# Patient Record
Sex: MALE | Race: WHITE | NOT HISPANIC OR LATINO
[De-identification: names, ages, dates, MRNs, and addresses within clinical notes are randomized per-mention and may not be internally consistent; named-entity substitution may affect disease eponyms.]

---

## 2019-04-12 PROBLEM — Z00.00 ENCOUNTER FOR PREVENTIVE HEALTH EXAMINATION: Status: ACTIVE | Noted: 2019-04-12

## 2019-04-15 ENCOUNTER — APPOINTMENT (OUTPATIENT)
Dept: ORTHOPEDIC SURGERY | Facility: CLINIC | Age: 54
End: 2019-04-15
Payer: COMMERCIAL

## 2019-04-15 VITALS
WEIGHT: 138 LBS | HEART RATE: 82 BPM | HEIGHT: 68 IN | DIASTOLIC BLOOD PRESSURE: 81 MMHG | TEMPERATURE: 98.4 F | SYSTOLIC BLOOD PRESSURE: 165 MMHG | BODY MASS INDEX: 20.92 KG/M2

## 2019-04-15 DIAGNOSIS — Z78.9 OTHER SPECIFIED HEALTH STATUS: ICD-10-CM

## 2019-04-15 DIAGNOSIS — Z82.61 FAMILY HISTORY OF ARTHRITIS: ICD-10-CM

## 2019-04-15 PROCEDURE — 99203 OFFICE O/P NEW LOW 30 MIN: CPT

## 2019-04-15 PROCEDURE — 73560 X-RAY EXAM OF KNEE 1 OR 2: CPT | Mod: LT

## 2019-04-22 NOTE — DISCUSSION/SUMMARY
[de-identified] : At this time, due to possible medial meniscal tear of left knee, I recommended he undergo an MRI.  He will be reassessed after the MRI.

## 2019-04-22 NOTE — ADDENDUM
[FreeTextEntry1] : This note was written by Rupal Brasher on 04/22/2019 acting as a scribe for [address]

## 2019-04-22 NOTE — PHYSICAL EXAM
[de-identified] : Ambulating with a slightly antalgic to antalgic gait.  Station:  Normal.  [de-identified] : Right Knee: \par Range of Motion in Degrees	\par 	                  Claimant:	Normal:	\par Flexion Active	  135 	                135-degrees	\par Flexion Passive	  135	                135-degrees	\par Extension Active	  0-5	                0-5-degrees	\par Extension Passive	  0-5	                0-5-degrees	\par \par No weakness to flexion/extension.  No evidence of instability in the AP plane or varus or valgus stress.  Negative  Lachman.  Negative pivot shift.  Negative anterior drawer test.  Negative posterior drawer test.  Negative Anabella.  Negative Apley grind.  No medial or lateral joint line tenderness.  No tenderness over the medial and lateral facet of the patella.  No patellofemoral crepitations.  No lateral tilting patella.  No patellar apprehension.  No crepitation in the medial and lateral femoral condyle.  No proximal or distal swelling, edema or tenderness.  No gross motor or sensory deficits.  No intra-articular swelling.  2+ DP and PT pulses. No varus or valgus malalignment.  Skin is intact.  No rashes, scars or lesions.  \par  \par Left Knee: \par Range of Motion in Degrees	\par 	                  Claimant:	Normal:	\par Flexion Active	  135 	                135-degrees	\par Flexion Passive	  135	                135-degrees	\par Extension Active	  0-5	                0-5-degrees	\par Extension Passive	  0-5	                0-5-degrees	\par \par No weakness to flexion/extension.  No evidence of instability in the AP plane or varus or valgus stress.  Negative  Lachman.  Negative pivot shift.  Negative anterior drawer test.  Negative posterior drawer test.  Positive medial joint line tenderness.  Negative lateral joint line tenderness.  Positive Anabella.  Positive Apley grind.  No tenderness over the medial and lateral facet of the patella.  No patellofemoral crepitations.  No lateral tilting patella.  No patella apprehension.  No crepitation in the medial and lateral femoral condyle.  No proximal or distal swelling, edema or tenderness.  No gross motor or sensory deficits.  Mild intra-articular swelling.  2+ DP and PT pulses.  No varus or valgus malalignment.  Skin is intact.  No rashes, scars or lesions.   \par   [de-identified] : Radiographs, two views of the left knee, show no obvious osseous abnormality. [de-identified] : Appearance:  Well-developed, well-nourished male in no acute distress.\par \par

## 2019-04-22 NOTE — HISTORY OF PRESENT ILLNESS
[2] : the ailment interference is 2/10 [5] : the ailment interference is 5/10 [de-identified] : The patient comes in today with complaints of pain to his left knee.  He twisted and turned and had an onset of pain and swelling. He thought it would get better, but it hasn't gotten better over the last several weeks and is getting worse. He has episodes of the knee catching, popping and locking.  The patient states the onset/injury occurred on 2/1/19.  This injury is not work related or due to an automobile accident.  The patient states the pain is intermittent.  The patient describes the pain as dull and the knee feels unstable.  The patient notes rest and ice makes his symptoms better, while twisting makes his symptoms worse.  The patient indicates pain is at a level of 2 on a pain scale of 0-10. [] : No

## 2019-05-29 ENCOUNTER — APPOINTMENT (OUTPATIENT)
Dept: ORTHOPEDIC SURGERY | Facility: CLINIC | Age: 54
End: 2019-05-29
Payer: COMMERCIAL

## 2019-05-29 VITALS
HEIGHT: 68 IN | DIASTOLIC BLOOD PRESSURE: 91 MMHG | TEMPERATURE: 98.5 F | WEIGHT: 240 LBS | BODY MASS INDEX: 36.37 KG/M2 | SYSTOLIC BLOOD PRESSURE: 141 MMHG | HEART RATE: 73 BPM

## 2019-05-29 DIAGNOSIS — M17.12 UNILATERAL PRIMARY OSTEOARTHRITIS, LEFT KNEE: ICD-10-CM

## 2019-05-29 PROCEDURE — 99212 OFFICE O/P EST SF 10 MIN: CPT

## 2019-06-03 NOTE — ADDENDUM
[FreeTextEntry1] : This note was written by Joshua Joseph on 05/31/2019, acting as a scribe for Kaushik Dorsey III, MD

## 2019-06-03 NOTE — PHYSICAL EXAM
[de-identified] : Left Knee: Range of Motion in Degrees	\par 	                  Claimant:	Normal:	\par Flexion Active	  135 	               135-degrees	\par Flexion Passive	  135	               135-degrees	\par Extension Active	  0-5	               0-5-degrees	\par Extension Passive     0-5	               0-5-degrees	\par \par No weakness to flexion/extension.  No evidence of instability in the AP plane or varus or valgus stress.  Negative  Lachman.  Negative pivot shift.  Negative anterior drawer test.  Negative posterior drawer test.  Positive Anabella.  Positive Apley grind.  Positive medial joint line tenderness.  Negative lateral joint line tenderness.  Positive tenderness over the medial and lateral facet of the patella.  Positive patellofemoral crepitations.  No lateral tilting patella.  No patellar apprehension.  Positive crepitation in the medial and lateral femoral condyle.  No proximal or distal swelling, edema or tenderness.  No gross motor or sensory deficits.  Mild intra-articular swelling.  2+ DP and PT pulses.  No varus or valgus malalignment.  Skin is intact.  No rashes, scars or lesions. \par  [de-identified] : Gait and Station:  Ambulating with a slightly antalgic to antalgic gait.  Normal Station.  [de-identified] : Appearance:  Well developed, well-nourished male in no acute distress.\par   [de-identified] : Review of MRI of the left knee shows a posterior horn medial meniscal tear.\par

## 2019-06-03 NOTE — DISCUSSION/SUMMARY
[de-identified] : At this time, due to osteoarthritis and medial meniscal tear of the left knee, we had a long discussion and since he is feeling better, I instructed him on home therapeutic modalities.  He will be reassessed in 4 weeks and we will discuss the potential for intervention depending on how he responds.\par \par The patient has been advised that their blood pressure today was outside normal ranges and the patient was instructed accordingly. Our Blood Pressure Monitoring Sheet with instructions was given to the patient.\par \par  \par

## 2019-06-03 NOTE — HISTORY OF PRESENT ILLNESS
[de-identified] : The patient comes in today for his left knee.  He states he is feeling much better.  Review of MRI is noted below.

## 2019-06-26 ENCOUNTER — APPOINTMENT (OUTPATIENT)
Dept: ORTHOPEDIC SURGERY | Facility: CLINIC | Age: 54
End: 2019-06-26

## 2021-01-08 ENCOUNTER — APPOINTMENT (OUTPATIENT)
Dept: COLORECTAL SURGERY | Facility: CLINIC | Age: 56
End: 2021-01-08
Payer: COMMERCIAL

## 2021-01-08 PROCEDURE — 99244 OFF/OP CNSLTJ NEW/EST MOD 40: CPT

## 2021-01-08 PROCEDURE — 99072 ADDL SUPL MATRL&STAF TM PHE: CPT

## 2021-01-08 NOTE — DATA REVIEWED
[FreeTextEntry1] : Colonoscopy demonstrates a large 6 cm ulcerated tumor of the proximal transverse colon\par Biopsies are pending

## 2021-01-08 NOTE — ASSESSMENT
[FreeTextEntry1] : 55-year-old male with cancer of the proximal transverse colon and anemia. Recommend further staging with CAT scan of the chest abdomen pelvis, CEA level liver function test. Will most likely require laparoscopic possible open extended right hemicolectomy.Risk and benefits of the surgery have been discussed which include bleeding, infection, sepsis, multiorgan failure, inadvertent injury including hollow viscus, solid organ, ureter neurovascular and neurological structures, DVT PE, heart attack, stroke, hernias, recurrence of tumor, leakage of anastomosis requiring reoperation possible stoma and death.

## 2021-01-08 NOTE — PHYSICAL EXAM
[Abdomen Masses] : No abdominal masses [Abdomen Tenderness] : ~T No ~M abdominal tenderness [Tender] : nontender [JVD] : no jugular venous distention  [Normal Breath Sounds] : Normal breath sounds [Normal Heart Sounds] : normal heart sounds [Normal Rate and Rhythm] : normal rate and rhythm [No Rash or Lesion] : No rash or lesion [Alert] : alert [Oriented to Person] : oriented to person [Oriented to Place] : oriented to place [Oriented to Time] : oriented to time [Calm] : calm [de-identified] : Obese [de-identified] : Looks well in no distress, of stated age. [de-identified] : pupils equal reactive to light normocephalic atraumatic. [de-identified] : moves all 4 extremities appropriately with 5 over 5 strength

## 2021-01-08 NOTE — HISTORY OF PRESENT ILLNESS
[FreeTextEntry1] : Consultation requested by Dr. Rosas for colon cancer. 55-year-old male found to be anemic on blood work colonoscopy today was performed and found to have a large ulcerated tumor of the proximal transverse colon suspicious for cancer. Patient denies any abdominal pain changes in bowel habits or any bleeding

## 2021-01-08 NOTE — CONSULT LETTER
[Dear  ___] : Dear  [unfilled], [Consult Letter:] : I had the pleasure of evaluating your patient, [unfilled]. [Please see my note below.] : Please see my note below. [Consult Closing:] : Thank you very much for allowing me to participate in the care of this patient.  If you have any questions, please do not hesitate to contact me. [Sincerely,] : Sincerely, [FreeTextEntry3] : Augustine Vasquez M.D. FACS, FASCRS

## 2021-01-11 ENCOUNTER — NON-APPOINTMENT (OUTPATIENT)
Age: 56
End: 2021-01-11

## 2021-01-14 ENCOUNTER — APPOINTMENT (OUTPATIENT)
Dept: CT IMAGING | Facility: CLINIC | Age: 56
End: 2021-01-14
Payer: COMMERCIAL

## 2021-01-14 ENCOUNTER — OUTPATIENT (OUTPATIENT)
Dept: OUTPATIENT SERVICES | Facility: HOSPITAL | Age: 56
LOS: 1 days | End: 2021-01-14
Payer: COMMERCIAL

## 2021-01-14 DIAGNOSIS — K63.89 OTHER SPECIFIED DISEASES OF INTESTINE: ICD-10-CM

## 2021-01-14 PROCEDURE — 71260 CT THORAX DX C+: CPT

## 2021-01-14 PROCEDURE — 71260 CT THORAX DX C+: CPT | Mod: 26

## 2021-01-14 PROCEDURE — 82565 ASSAY OF CREATININE: CPT

## 2021-01-14 PROCEDURE — 74177 CT ABD & PELVIS W/CONTRAST: CPT

## 2021-01-14 PROCEDURE — 74177 CT ABD & PELVIS W/CONTRAST: CPT | Mod: 26

## 2021-01-15 ENCOUNTER — NON-APPOINTMENT (OUTPATIENT)
Age: 56
End: 2021-01-15

## 2021-01-19 ENCOUNTER — OUTPATIENT (OUTPATIENT)
Dept: OUTPATIENT SERVICES | Facility: HOSPITAL | Age: 56
LOS: 1 days | End: 2021-01-19
Payer: COMMERCIAL

## 2021-01-19 VITALS
TEMPERATURE: 98 F | SYSTOLIC BLOOD PRESSURE: 129 MMHG | DIASTOLIC BLOOD PRESSURE: 74 MMHG | HEIGHT: 68 IN | OXYGEN SATURATION: 99 % | WEIGHT: 232.59 LBS | HEART RATE: 90 BPM | RESPIRATION RATE: 16 BRPM

## 2021-01-19 DIAGNOSIS — Z98.890 OTHER SPECIFIED POSTPROCEDURAL STATES: Chronic | ICD-10-CM

## 2021-01-19 DIAGNOSIS — Z01.818 ENCOUNTER FOR OTHER PREPROCEDURAL EXAMINATION: ICD-10-CM

## 2021-01-19 DIAGNOSIS — C18.4 MALIGNANT NEOPLASM OF TRANSVERSE COLON: ICD-10-CM

## 2021-01-19 LAB
ALBUMIN SERPL ELPH-MCNC: 3.9 G/DL — SIGNIFICANT CHANGE UP (ref 3.3–5)
ALP SERPL-CCNC: 50 U/L — SIGNIFICANT CHANGE UP (ref 40–120)
ALT FLD-CCNC: 30 U/L — SIGNIFICANT CHANGE UP (ref 12–78)
ANION GAP SERPL CALC-SCNC: 5 MMOL/L — SIGNIFICANT CHANGE UP (ref 5–17)
APTT BLD: 26 SEC — LOW (ref 27.5–35.5)
AST SERPL-CCNC: 14 U/L — LOW (ref 15–37)
BASOPHILS # BLD AUTO: 0.04 K/UL — SIGNIFICANT CHANGE UP (ref 0–0.2)
BASOPHILS NFR BLD AUTO: 0.8 % — SIGNIFICANT CHANGE UP (ref 0–2)
BILIRUB DIRECT SERPL-MCNC: <0.1 MG/DL — SIGNIFICANT CHANGE UP (ref 0–0.2)
BILIRUB SERPL-MCNC: 0.4 MG/DL — SIGNIFICANT CHANGE UP (ref 0.2–1.2)
BUN SERPL-MCNC: 15 MG/DL — SIGNIFICANT CHANGE UP (ref 7–23)
CALCIUM SERPL-MCNC: 9.7 MG/DL — SIGNIFICANT CHANGE UP (ref 8.5–10.1)
CHLORIDE SERPL-SCNC: 107 MMOL/L — SIGNIFICANT CHANGE UP (ref 96–108)
CO2 SERPL-SCNC: 28 MMOL/L — SIGNIFICANT CHANGE UP (ref 22–31)
CREAT SERPL-MCNC: 0.75 MG/DL — SIGNIFICANT CHANGE UP (ref 0.5–1.3)
EOSINOPHIL # BLD AUTO: 0.07 K/UL — SIGNIFICANT CHANGE UP (ref 0–0.5)
EOSINOPHIL NFR BLD AUTO: 1.4 % — SIGNIFICANT CHANGE UP (ref 0–6)
GLUCOSE SERPL-MCNC: 116 MG/DL — HIGH (ref 70–99)
HCT VFR BLD CALC: 38.4 % — LOW (ref 39–50)
HGB BLD-MCNC: 11.5 G/DL — LOW (ref 13–17)
IMM GRANULOCYTES NFR BLD AUTO: 0.2 % — SIGNIFICANT CHANGE UP (ref 0–1.5)
INR BLD: 1.06 RATIO — SIGNIFICANT CHANGE UP (ref 0.88–1.16)
LYMPHOCYTES # BLD AUTO: 1.54 K/UL — SIGNIFICANT CHANGE UP (ref 1–3.3)
LYMPHOCYTES # BLD AUTO: 30.2 % — SIGNIFICANT CHANGE UP (ref 13–44)
MCHC RBC-ENTMCNC: 22 PG — LOW (ref 27–34)
MCHC RBC-ENTMCNC: 29.9 GM/DL — LOW (ref 32–36)
MCV RBC AUTO: 73.6 FL — LOW (ref 80–100)
MONOCYTES # BLD AUTO: 0.48 K/UL — SIGNIFICANT CHANGE UP (ref 0–0.9)
MONOCYTES NFR BLD AUTO: 9.4 % — SIGNIFICANT CHANGE UP (ref 2–14)
NEUTROPHILS # BLD AUTO: 2.96 K/UL — SIGNIFICANT CHANGE UP (ref 1.8–7.4)
NEUTROPHILS NFR BLD AUTO: 58 % — SIGNIFICANT CHANGE UP (ref 43–77)
PLATELET # BLD AUTO: 215 K/UL — SIGNIFICANT CHANGE UP (ref 150–400)
POTASSIUM SERPL-MCNC: 3.8 MMOL/L — SIGNIFICANT CHANGE UP (ref 3.5–5.3)
POTASSIUM SERPL-SCNC: 3.8 MMOL/L — SIGNIFICANT CHANGE UP (ref 3.5–5.3)
PROT SERPL-MCNC: 7.6 GM/DL — SIGNIFICANT CHANGE UP (ref 6–8.3)
PROTHROM AB SERPL-ACNC: 12.4 SEC — SIGNIFICANT CHANGE UP (ref 10.6–13.6)
RBC # BLD: 5.22 M/UL — SIGNIFICANT CHANGE UP (ref 4.2–5.8)
RBC # FLD: 18.3 % — HIGH (ref 10.3–14.5)
SODIUM SERPL-SCNC: 140 MMOL/L — SIGNIFICANT CHANGE UP (ref 135–145)
WBC # BLD: 5.1 K/UL — SIGNIFICANT CHANGE UP (ref 3.8–10.5)
WBC # FLD AUTO: 5.1 K/UL — SIGNIFICANT CHANGE UP (ref 3.8–10.5)

## 2021-01-19 PROCEDURE — 82378 CARCINOEMBRYONIC ANTIGEN: CPT

## 2021-01-19 PROCEDURE — 80053 COMPREHEN METABOLIC PANEL: CPT

## 2021-01-19 PROCEDURE — 82248 BILIRUBIN DIRECT: CPT

## 2021-01-19 PROCEDURE — 85610 PROTHROMBIN TIME: CPT

## 2021-01-19 PROCEDURE — 93010 ELECTROCARDIOGRAM REPORT: CPT

## 2021-01-19 PROCEDURE — G0463: CPT | Mod: 25

## 2021-01-19 PROCEDURE — 83036 HEMOGLOBIN GLYCOSYLATED A1C: CPT

## 2021-01-19 PROCEDURE — 36415 COLL VENOUS BLD VENIPUNCTURE: CPT

## 2021-01-19 PROCEDURE — 86900 BLOOD TYPING SEROLOGIC ABO: CPT

## 2021-01-19 PROCEDURE — 85025 COMPLETE CBC W/AUTO DIFF WBC: CPT

## 2021-01-19 PROCEDURE — 86850 RBC ANTIBODY SCREEN: CPT

## 2021-01-19 PROCEDURE — 85730 THROMBOPLASTIN TIME PARTIAL: CPT

## 2021-01-19 PROCEDURE — 93005 ELECTROCARDIOGRAM TRACING: CPT

## 2021-01-19 PROCEDURE — 86901 BLOOD TYPING SEROLOGIC RH(D): CPT

## 2021-01-19 RX ORDER — ERGOCALCIFEROL 1.25 MG/1
1 CAPSULE ORAL
Qty: 0 | Refills: 0 | DISCHARGE

## 2021-01-19 NOTE — H&P PST ADULT - NSANTHOSAYNRD_GEN_A_CORE
No. MANSI screening performed.  STOP BANG Legend: 0-2 = LOW Risk; 3-4 = INTERMEDIATE Risk; 5-8 = HIGH Risk

## 2021-01-19 NOTE — H&P PST ADULT - NSICDXFAMILYHX_GEN_ALL_CORE_FT
FAMILY HISTORY:  FHx: cardiovascular disease, brother has a stent and is alive and well as of 1/19/2021  FHx: pancreatic cancer, father passed away

## 2021-01-19 NOTE — H&P PST ADULT - HISTORY OF PRESENT ILLNESS
This is a 56 y/o male with a PMH of HTN and HLD who reports he had a CBC drawn with regular labs which showed he was anemic. He than did a quaic stool which was positive. He has since had a colonoscopy which showed a colon mass and he is now scheduled for a Right hemicolectomy He denies any change in bowel habits, no visible blood in stool, dysuria, back pain, abdominal pain N/V/D or constipation or SOB, chest pain or palpations.  This is a 56 y/o male with a PMH of HTN and HLD who reports he had a CBC drawn with regular labs at his PCP office which showed he was anemic. He than did a guaiac stool which was positive. He has since had a colonoscopy which showed a colon mass and he is now scheduled for a Right hemicolectomy. He denies any change in bowel habits, no visible blood in stool, dysuria, back pain, abdominal pain, N/V/D or constipation, SOB, chest pain or palpations.

## 2021-01-19 NOTE — H&P PST ADULT - ASSESSMENT
This is a 56 y/o male with a colon mass who is scheduled for a Right hemicolectomy    Patient instructed on     1. To follow instructions as per ASU for NPO status   2. On the use of EZ sponges  3. Aware that he needs medical clearance (was done by Dr. Lizama)   4. Instructed to call [phone] to confirm COVID 19 appointment scheduled for     CAPRINI SCORE    AGE RELATED RISK FACTORS                                                       MOBILITY RELATED FACTORS  X Age 41-60 years                                            (1 Point)                  [ ] Bed rest                                                        (1 Point)  [ ] Age: 61-74 years                                           (2 Points)                [ ] Plaster cast                                                   (2 Points)  [ ] Age= 75 years                                              (3 Points)                 [ ] Bed bound for more than 72 hours                   (2 Points)    DISEASE RELATED RISK FACTORS                                               GENDER SPECIFIC FACTORS  [ ] Edema in the lower extremities                       (1 Point)                  [ ] Pregnancy                                                     (1 Point)  [ ] Varicose veins                                               (1 Point)                  [ ] Post-partum < 6 weeks                                   (1 Point)             X  BMI > 25 Kg/m2                                            (1 Point)                  [ ] Hormonal therapy  or oral contraception            (1 Point)                 [ ] Sepsis (in the previous month)                        (1 Point)                  [ ] History of pregnancy complications  [ ] Pneumonia or serious lung disease                                               [ ] Unexplained or recurrent                       (1 Point)           (in the previous month)                               (1 Point)  [ ] Abnormal pulmonary function test                     (1 Point)                 SURGERY RELATED RISK FACTORS  [ ] Acute myocardial infarction                              (1 Point)                 [ ]  Section                                            (1 Point)  [ ] Congestive heart failure (in the previous month)  (1 Point)                 [ ] Minor surgery                                                 (1 Point)   [ ] Inflammatory bowel disease                             (1 Point)                 [ ] Arthroscopic surgery                                        (2 Points)  [ ] Central venous access                                    (2 Points)                X  General surgery lasting more than 45 minutes   (2 Points)       [ ] Stroke (in the previous month)                          (5 Points)               [ ] Elective arthroplasty                                        (5 Points)            X malignancy                                                      (2 points)                                                                                                                                 HEMATOLOGY RELATED FACTORS                                                 TRAUMA RELATED RISK FACTORS  [ ] Prior episodes of VTE                                     (3 Points)                 [ ] Fracture of the hip, pelvis, or leg                       (5 Points)  [ ] Positive family history for VTE                         (3 Points)                 [ ] Acute spinal cord injury (in the previous month)  (5 Points)  [ ] Prothrombin 26350 A                                      (3 Points)                 [ ] Paralysis  (less than 1 month)                          (5 Points)  [ ] Factor V Leiden                                             (3 Points)                 [ ] Multiple Trauma within 1 month                         (5 Points)  [ ] Lupus anticoagulants                                     (3 Points)                                                           [ ] Anticardiolipin antibodies                                (3 Points)                                                       [ ] High homocysteine in the blood                      (3 Points)                                             [ ] Other congenital or acquired thrombophilia       (3 Points)                                                [ ] Heparin induced thrombocytopenia                  (3 Points)                                          Total Score: 6    The Caprini score indicates that this patient is at high risk for a VTE event (score => 6).    Surgical patients in this group will benefit from both pharmacologic prophylaxis and intermittent compression devices.    The surgical team will determine the balance between VTE risk and bleeding risk, and other clinical considerations.

## 2021-01-20 DIAGNOSIS — C18.4 MALIGNANT NEOPLASM OF TRANSVERSE COLON: ICD-10-CM

## 2021-01-20 DIAGNOSIS — Z01.818 ENCOUNTER FOR OTHER PREPROCEDURAL EXAMINATION: ICD-10-CM

## 2021-01-20 LAB
A1C WITH ESTIMATED AVERAGE GLUCOSE RESULT: 6.4 % — HIGH (ref 4–5.6)
CEA SERPL-MCNC: 4.7 NG/ML — HIGH (ref 0–3.8)
ESTIMATED AVERAGE GLUCOSE: 137 MG/DL — HIGH (ref 68–114)

## 2021-01-21 RX ORDER — SODIUM CHLORIDE 9 MG/ML
3 INJECTION INTRAMUSCULAR; INTRAVENOUS; SUBCUTANEOUS EVERY 8 HOURS
Refills: 0 | Status: DISCONTINUED | OUTPATIENT
Start: 2021-01-27 | End: 2021-01-29

## 2021-01-24 ENCOUNTER — APPOINTMENT (OUTPATIENT)
Dept: DISASTER EMERGENCY | Facility: CLINIC | Age: 56
End: 2021-01-24

## 2021-01-26 ENCOUNTER — OUTPATIENT (OUTPATIENT)
Dept: OUTPATIENT SERVICES | Facility: HOSPITAL | Age: 56
LOS: 1 days | End: 2021-01-26
Payer: COMMERCIAL

## 2021-01-26 ENCOUNTER — RESULT REVIEW (OUTPATIENT)
Age: 56
End: 2021-01-26

## 2021-01-26 DIAGNOSIS — Z98.890 OTHER SPECIFIED POSTPROCEDURAL STATES: Chronic | ICD-10-CM

## 2021-01-26 DIAGNOSIS — C18.4 MALIGNANT NEOPLASM OF TRANSVERSE COLON: ICD-10-CM

## 2021-01-26 PROBLEM — K63.89 OTHER SPECIFIED DISEASES OF INTESTINE: Chronic | Status: ACTIVE | Noted: 2021-01-19

## 2021-01-26 PROBLEM — I10 ESSENTIAL (PRIMARY) HYPERTENSION: Chronic | Status: ACTIVE | Noted: 2021-01-19

## 2021-01-26 PROBLEM — E78.5 HYPERLIPIDEMIA, UNSPECIFIED: Chronic | Status: ACTIVE | Noted: 2021-01-19

## 2021-01-26 LAB — SARS-COV-2 N GENE NPH QL NAA+PROBE: NOT DETECTED

## 2021-01-26 PROCEDURE — 88321 CONSLTJ&REPRT SLD PREP ELSWR: CPT

## 2021-01-27 ENCOUNTER — RESULT REVIEW (OUTPATIENT)
Age: 56
End: 2021-01-27

## 2021-01-27 ENCOUNTER — APPOINTMENT (OUTPATIENT)
Dept: COLORECTAL SURGERY | Facility: HOSPITAL | Age: 56
End: 2021-01-27
Payer: COMMERCIAL

## 2021-01-27 ENCOUNTER — INPATIENT (INPATIENT)
Facility: HOSPITAL | Age: 56
LOS: 1 days | Discharge: HOME | DRG: 331 | End: 2021-01-29
Attending: COLON & RECTAL SURGERY | Admitting: COLON & RECTAL SURGERY
Payer: COMMERCIAL

## 2021-01-27 VITALS
TEMPERATURE: 98 F | HEIGHT: 68 IN | DIASTOLIC BLOOD PRESSURE: 84 MMHG | HEART RATE: 83 BPM | SYSTOLIC BLOOD PRESSURE: 128 MMHG | RESPIRATION RATE: 16 BRPM | WEIGHT: 232.59 LBS | OXYGEN SATURATION: 99 %

## 2021-01-27 DIAGNOSIS — C18.4 MALIGNANT NEOPLASM OF TRANSVERSE COLON: ICD-10-CM

## 2021-01-27 DIAGNOSIS — D50.0 IRON DEFICIENCY ANEMIA SECONDARY TO BLOOD LOSS (CHRONIC): ICD-10-CM

## 2021-01-27 DIAGNOSIS — Z98.890 OTHER SPECIFIED POSTPROCEDURAL STATES: Chronic | ICD-10-CM

## 2021-01-27 PROCEDURE — 49255 REMOVAL OF OMENTUM: CPT | Mod: AS

## 2021-01-27 PROCEDURE — 88309 TISSUE EXAM BY PATHOLOGIST: CPT

## 2021-01-27 PROCEDURE — 44213 LAP MOBIL SPLENIC FL ADD-ON: CPT

## 2021-01-27 PROCEDURE — 44213 LAP MOBIL SPLENIC FL ADD-ON: CPT | Mod: AS

## 2021-01-27 PROCEDURE — 88341 IMHCHEM/IMCYTCHM EA ADD ANTB: CPT | Mod: 26

## 2021-01-27 PROCEDURE — 44204 LAPARO PARTIAL COLECTOMY: CPT | Mod: AS

## 2021-01-27 PROCEDURE — 88341 IMHCHEM/IMCYTCHM EA ADD ANTB: CPT

## 2021-01-27 PROCEDURE — 49255 REMOVAL OF OMENTUM: CPT

## 2021-01-27 PROCEDURE — 44204 LAPARO PARTIAL COLECTOMY: CPT

## 2021-01-27 PROCEDURE — 88309 TISSUE EXAM BY PATHOLOGIST: CPT | Mod: 26

## 2021-01-27 PROCEDURE — C1889: CPT

## 2021-01-27 PROCEDURE — 83735 ASSAY OF MAGNESIUM: CPT

## 2021-01-27 PROCEDURE — 36415 COLL VENOUS BLD VENIPUNCTURE: CPT

## 2021-01-27 PROCEDURE — 38570 LAPAROSCOPY LYMPH NODE BIOP: CPT

## 2021-01-27 PROCEDURE — 38570 LAPAROSCOPY LYMPH NODE BIOP: CPT | Mod: AS

## 2021-01-27 PROCEDURE — 88342 IMHCHEM/IMCYTCHM 1ST ANTB: CPT | Mod: 26

## 2021-01-27 PROCEDURE — 85025 COMPLETE CBC W/AUTO DIFF WBC: CPT

## 2021-01-27 PROCEDURE — C9399: CPT

## 2021-01-27 PROCEDURE — 80048 BASIC METABOLIC PNL TOTAL CA: CPT

## 2021-01-27 PROCEDURE — 88342 IMHCHEM/IMCYTCHM 1ST ANTB: CPT

## 2021-01-27 PROCEDURE — 84100 ASSAY OF PHOSPHORUS: CPT

## 2021-01-27 PROCEDURE — 82962 GLUCOSE BLOOD TEST: CPT

## 2021-01-27 RX ORDER — OXYCODONE HYDROCHLORIDE 5 MG/1
5 TABLET ORAL EVERY 4 HOURS
Refills: 0 | Status: DISCONTINUED | OUTPATIENT
Start: 2021-01-27 | End: 2021-01-29

## 2021-01-27 RX ORDER — SODIUM CHLORIDE 9 MG/ML
1000 INJECTION, SOLUTION INTRAVENOUS
Refills: 0 | Status: DISCONTINUED | OUTPATIENT
Start: 2021-01-27 | End: 2021-01-27

## 2021-01-27 RX ORDER — CEFOTETAN DISODIUM 1 G
2 VIAL (EA) INJECTION EVERY 12 HOURS
Refills: 0 | Status: COMPLETED | OUTPATIENT
Start: 2021-01-28 | End: 2021-01-28

## 2021-01-27 RX ORDER — ACETAMINOPHEN 500 MG
1000 TABLET ORAL EVERY 6 HOURS
Refills: 0 | Status: COMPLETED | OUTPATIENT
Start: 2021-01-27 | End: 2021-01-28

## 2021-01-27 RX ORDER — KETOROLAC TROMETHAMINE 30 MG/ML
15 SYRINGE (ML) INJECTION EVERY 6 HOURS
Refills: 0 | Status: DISCONTINUED | OUTPATIENT
Start: 2021-01-27 | End: 2021-01-28

## 2021-01-27 RX ORDER — OXYCODONE HYDROCHLORIDE 5 MG/1
10 TABLET ORAL EVERY 4 HOURS
Refills: 0 | Status: DISCONTINUED | OUTPATIENT
Start: 2021-01-27 | End: 2021-01-29

## 2021-01-27 RX ORDER — ONDANSETRON 8 MG/1
4 TABLET, FILM COATED ORAL ONCE
Refills: 0 | Status: DISCONTINUED | OUTPATIENT
Start: 2021-01-27 | End: 2021-01-27

## 2021-01-27 RX ORDER — FENTANYL CITRATE 50 UG/ML
50 INJECTION INTRAVENOUS
Refills: 0 | Status: DISCONTINUED | OUTPATIENT
Start: 2021-01-27 | End: 2021-01-27

## 2021-01-27 RX ORDER — HEPARIN SODIUM 5000 [USP'U]/ML
5000 INJECTION INTRAVENOUS; SUBCUTANEOUS EVERY 8 HOURS
Refills: 0 | Status: DISCONTINUED | OUTPATIENT
Start: 2021-01-28 | End: 2021-01-29

## 2021-01-27 RX ORDER — SODIUM CHLORIDE 9 MG/ML
1000 INJECTION, SOLUTION INTRAVENOUS
Refills: 0 | Status: DISCONTINUED | OUTPATIENT
Start: 2021-01-27 | End: 2021-01-28

## 2021-01-27 RX ORDER — LISINOPRIL 2.5 MG/1
10 TABLET ORAL DAILY
Refills: 0 | Status: DISCONTINUED | OUTPATIENT
Start: 2021-01-27 | End: 2021-01-29

## 2021-01-27 RX ORDER — ALVIMOPAN 12 MG/1
12 CAPSULE ORAL
Refills: 0 | Status: DISCONTINUED | OUTPATIENT
Start: 2021-01-27 | End: 2021-01-28

## 2021-01-27 RX ORDER — ACETAMINOPHEN 500 MG
650 TABLET ORAL EVERY 6 HOURS
Refills: 0 | Status: DISCONTINUED | OUTPATIENT
Start: 2021-01-27 | End: 2021-01-29

## 2021-01-27 RX ORDER — HEPARIN SODIUM 5000 [USP'U]/ML
5000 INJECTION INTRAVENOUS; SUBCUTANEOUS ONCE
Refills: 0 | Status: COMPLETED | OUTPATIENT
Start: 2021-01-27 | End: 2021-01-27

## 2021-01-27 RX ORDER — ONDANSETRON 8 MG/1
4 TABLET, FILM COATED ORAL EVERY 6 HOURS
Refills: 0 | Status: DISCONTINUED | OUTPATIENT
Start: 2021-01-27 | End: 2021-01-29

## 2021-01-27 RX ORDER — ALVIMOPAN 12 MG/1
12 CAPSULE ORAL ONCE
Refills: 0 | Status: COMPLETED | OUTPATIENT
Start: 2021-01-27 | End: 2021-01-27

## 2021-01-27 RX ORDER — ATORVASTATIN CALCIUM 80 MG/1
80 TABLET, FILM COATED ORAL AT BEDTIME
Refills: 0 | Status: DISCONTINUED | OUTPATIENT
Start: 2021-01-27 | End: 2021-01-29

## 2021-01-27 RX ADMIN — HEPARIN SODIUM 5000 UNIT(S): 5000 INJECTION INTRAVENOUS; SUBCUTANEOUS at 11:38

## 2021-01-27 RX ADMIN — Medication 15 MILLIGRAM(S): at 23:41

## 2021-01-27 RX ADMIN — LISINOPRIL 10 MILLIGRAM(S): 2.5 TABLET ORAL at 22:08

## 2021-01-27 RX ADMIN — SODIUM CHLORIDE 3 MILLILITER(S): 9 INJECTION INTRAMUSCULAR; INTRAVENOUS; SUBCUTANEOUS at 22:05

## 2021-01-27 RX ADMIN — Medication 400 MILLIGRAM(S): at 23:41

## 2021-01-27 RX ADMIN — ATORVASTATIN CALCIUM 80 MILLIGRAM(S): 80 TABLET, FILM COATED ORAL at 22:09

## 2021-01-27 RX ADMIN — ALVIMOPAN 12 MILLIGRAM(S): 12 CAPSULE ORAL at 11:38

## 2021-01-27 RX ADMIN — ALVIMOPAN 12 MILLIGRAM(S): 12 CAPSULE ORAL at 22:08

## 2021-01-27 NOTE — BRIEF OPERATIVE NOTE - NSICDXBRIEFPROCEDURE_GEN_ALL_CORE_FT
PROCEDURES:  Mobilization, hepatic flexure 27-Jan-2021 16:13:28  Mele Montero  Laparoscopic hand-assisted right hemicolectomy 27-Jan-2021 16:12:35  Mele Montero

## 2021-01-28 ENCOUNTER — TRANSCRIPTION ENCOUNTER (OUTPATIENT)
Age: 56
End: 2021-01-28

## 2021-01-28 LAB
ANION GAP SERPL CALC-SCNC: 5 MMOL/L — SIGNIFICANT CHANGE UP (ref 5–17)
BASOPHILS # BLD AUTO: 0.01 K/UL — SIGNIFICANT CHANGE UP (ref 0–0.2)
BASOPHILS NFR BLD AUTO: 0.1 % — SIGNIFICANT CHANGE UP (ref 0–2)
BUN SERPL-MCNC: 11 MG/DL — SIGNIFICANT CHANGE UP (ref 7–23)
CALCIUM SERPL-MCNC: 9.6 MG/DL — SIGNIFICANT CHANGE UP (ref 8.5–10.1)
CHLORIDE SERPL-SCNC: 107 MMOL/L — SIGNIFICANT CHANGE UP (ref 96–108)
CO2 SERPL-SCNC: 27 MMOL/L — SIGNIFICANT CHANGE UP (ref 22–31)
CREAT SERPL-MCNC: 1.26 MG/DL — SIGNIFICANT CHANGE UP (ref 0.5–1.3)
EOSINOPHIL # BLD AUTO: 0 K/UL — SIGNIFICANT CHANGE UP (ref 0–0.5)
EOSINOPHIL NFR BLD AUTO: 0 % — SIGNIFICANT CHANGE UP (ref 0–6)
GLUCOSE SERPL-MCNC: 123 MG/DL — HIGH (ref 70–99)
HCT VFR BLD CALC: 36.1 % — LOW (ref 39–50)
HGB BLD-MCNC: 10.8 G/DL — LOW (ref 13–17)
IMM GRANULOCYTES NFR BLD AUTO: 0.3 % — SIGNIFICANT CHANGE UP (ref 0–1.5)
LYMPHOCYTES # BLD AUTO: 1.66 K/UL — SIGNIFICANT CHANGE UP (ref 1–3.3)
LYMPHOCYTES # BLD AUTO: 12.9 % — LOW (ref 13–44)
MAGNESIUM SERPL-MCNC: 2.2 MG/DL — SIGNIFICANT CHANGE UP (ref 1.6–2.6)
MCHC RBC-ENTMCNC: 21.9 PG — LOW (ref 27–34)
MCHC RBC-ENTMCNC: 29.9 GM/DL — LOW (ref 32–36)
MCV RBC AUTO: 73.2 FL — LOW (ref 80–100)
MONOCYTES # BLD AUTO: 1.39 K/UL — HIGH (ref 0–0.9)
MONOCYTES NFR BLD AUTO: 10.8 % — SIGNIFICANT CHANGE UP (ref 2–14)
NEUTROPHILS # BLD AUTO: 9.81 K/UL — HIGH (ref 1.8–7.4)
NEUTROPHILS NFR BLD AUTO: 75.9 % — SIGNIFICANT CHANGE UP (ref 43–77)
PHOSPHATE SERPL-MCNC: 3.8 MG/DL — SIGNIFICANT CHANGE UP (ref 2.5–4.5)
PLATELET # BLD AUTO: 237 K/UL — SIGNIFICANT CHANGE UP (ref 150–400)
POTASSIUM SERPL-MCNC: 4.2 MMOL/L — SIGNIFICANT CHANGE UP (ref 3.5–5.3)
POTASSIUM SERPL-SCNC: 4.2 MMOL/L — SIGNIFICANT CHANGE UP (ref 3.5–5.3)
RBC # BLD: 4.93 M/UL — SIGNIFICANT CHANGE UP (ref 4.2–5.8)
RBC # FLD: 18.4 % — HIGH (ref 10.3–14.5)
SODIUM SERPL-SCNC: 139 MMOL/L — SIGNIFICANT CHANGE UP (ref 135–145)
WBC # BLD: 12.91 K/UL — HIGH (ref 3.8–10.5)
WBC # FLD AUTO: 12.91 K/UL — HIGH (ref 3.8–10.5)

## 2021-01-28 RX ORDER — SIMETHICONE 80 MG/1
80 TABLET, CHEWABLE ORAL ONCE
Refills: 0 | Status: COMPLETED | OUTPATIENT
Start: 2021-01-28 | End: 2021-01-28

## 2021-01-28 RX ORDER — ALVIMOPAN 12 MG/1
12 CAPSULE ORAL
Refills: 0 | Status: DISCONTINUED | OUTPATIENT
Start: 2021-01-28 | End: 2021-01-29

## 2021-01-28 RX ADMIN — HEPARIN SODIUM 5000 UNIT(S): 5000 INJECTION INTRAVENOUS; SUBCUTANEOUS at 13:25

## 2021-01-28 RX ADMIN — OXYCODONE HYDROCHLORIDE 10 MILLIGRAM(S): 5 TABLET ORAL at 01:30

## 2021-01-28 RX ADMIN — Medication 100 GRAM(S): at 15:02

## 2021-01-28 RX ADMIN — SIMETHICONE 80 MILLIGRAM(S): 80 TABLET, CHEWABLE ORAL at 00:42

## 2021-01-28 RX ADMIN — Medication 100 GRAM(S): at 04:18

## 2021-01-28 RX ADMIN — SODIUM CHLORIDE 3 MILLILITER(S): 9 INJECTION INTRAMUSCULAR; INTRAVENOUS; SUBCUTANEOUS at 10:25

## 2021-01-28 RX ADMIN — OXYCODONE HYDROCHLORIDE 10 MILLIGRAM(S): 5 TABLET ORAL at 18:42

## 2021-01-28 RX ADMIN — HEPARIN SODIUM 5000 UNIT(S): 5000 INJECTION INTRAVENOUS; SUBCUTANEOUS at 21:14

## 2021-01-28 RX ADMIN — Medication 400 MILLIGRAM(S): at 11:04

## 2021-01-28 RX ADMIN — OXYCODONE HYDROCHLORIDE 10 MILLIGRAM(S): 5 TABLET ORAL at 23:30

## 2021-01-28 RX ADMIN — ALVIMOPAN 12 MILLIGRAM(S): 12 CAPSULE ORAL at 21:15

## 2021-01-28 RX ADMIN — OXYCODONE HYDROCHLORIDE 10 MILLIGRAM(S): 5 TABLET ORAL at 05:37

## 2021-01-28 RX ADMIN — LISINOPRIL 10 MILLIGRAM(S): 2.5 TABLET ORAL at 21:15

## 2021-01-28 RX ADMIN — SODIUM CHLORIDE 3 MILLILITER(S): 9 INJECTION INTRAMUSCULAR; INTRAVENOUS; SUBCUTANEOUS at 05:30

## 2021-01-28 RX ADMIN — ALVIMOPAN 12 MILLIGRAM(S): 12 CAPSULE ORAL at 10:24

## 2021-01-28 RX ADMIN — Medication 15 MILLIGRAM(S): at 11:03

## 2021-01-28 RX ADMIN — ATORVASTATIN CALCIUM 80 MILLIGRAM(S): 80 TABLET, FILM COATED ORAL at 21:15

## 2021-01-28 RX ADMIN — Medication 400 MILLIGRAM(S): at 05:29

## 2021-01-28 RX ADMIN — Medication 15 MILLIGRAM(S): at 05:30

## 2021-01-28 RX ADMIN — SODIUM CHLORIDE 3 MILLILITER(S): 9 INJECTION INTRAMUSCULAR; INTRAVENOUS; SUBCUTANEOUS at 21:16

## 2021-01-28 RX ADMIN — Medication 1 TABLET(S): at 10:25

## 2021-01-28 RX ADMIN — HEPARIN SODIUM 5000 UNIT(S): 5000 INJECTION INTRAVENOUS; SUBCUTANEOUS at 05:30

## 2021-01-28 NOTE — DISCHARGE NOTE PROVIDER - NSDCCPTREATMENT_GEN_ALL_CORE_FT
PRINCIPAL PROCEDURE  Procedure: Laparoscopic hand-assisted right hemicolectomy  Findings and Treatment:       SECONDARY PROCEDURE  Procedure: Mobilization, hepatic flexure  Findings and Treatment:

## 2021-01-28 NOTE — DISCHARGE NOTE PROVIDER - NSDCACTIVITY_GEN_ALL_CORE
Showering allowed/Stairs allowed/Driving allowed/Walking - Indoors allowed/No heavy lifting/straining/Walking - Outdoors allowed

## 2021-01-28 NOTE — PROGRESS NOTE ADULT - SUBJECTIVE AND OBJECTIVE BOX
CC:Patient is a 55y old  Male who presents with a chief complaint of     Subjective:  Pt seen and examined at bedside. Pt is AAOx3, pt in no acute distress. Pt denies fever, chills, chest pain, SOB, abd pain, N/V/D, extremity pain or dysfunction, hemoptysis, hematemesis, hematuria, hematochexia, headache, diplopia, vertigo, dizzyness. Natarajan removed this am.     ROS:      Vital Signs Last 24 Hrs  T(C): 36.7 (28 Jan 2021 05:23), Max: 37.3 (27 Jan 2021 16:10)  T(F): 98 (28 Jan 2021 05:23), Max: 99.1 (27 Jan 2021 16:10)  HR: 75 (28 Jan 2021 05:23) (75 - 107)  BP: 100/56 (28 Jan 2021 05:23) (100/56 - 156/70)  BP(mean): --  RR: 18 (28 Jan 2021 05:23) (12 - 23)  SpO2: 95% (28 Jan 2021 05:23) (95% - 100%)    Labs:                            Meds:  acetaminophen   Tablet .. 650 milliGRAM(s) Oral every 6 hours PRN  acetaminophen  IVPB .. 1000 milliGRAM(s) IV Intermittent every 6 hours  alvimopan 12 milliGRAM(s) Oral two times a day  atorvastatin 80 milliGRAM(s) Oral at bedtime  cefoTEtan  IVPB 2 Gram(s) IV Intermittent every 12 hours  heparin   Injectable 5000 Unit(s) SubCutaneous every 8 hours  ketorolac   Injectable 15 milliGRAM(s) IV Push every 6 hours  lactated ringers. 1000 milliLiter(s) IV Continuous <Continuous>  lisinopril 10 milliGRAM(s) Oral daily  multivitamin 1 Tablet(s) Oral daily  ondansetron Injectable 4 milliGRAM(s) IV Push every 6 hours PRN  oxyCODONE    IR 5 milliGRAM(s) Oral every 4 hours PRN  oxyCODONE    IR 10 milliGRAM(s) Oral every 4 hours PRN  sodium chloride 0.9% lock flush 3 milliLiter(s) IV Push every 8 hours      Radiology:      Physical exam:  Pt is aaox3  Pt in no acute distress  Psych: normal affect  Resp: CTAB  CVS: S1S2(+)  ABD: bowel sounds (+), soft, non distended, no rebound, no guarding, no rigidity, dressing in place   EXT: no calf tenderness or edema to exam b/l, on VTE prophylaxis  Skin: no adverse skin changes to exam

## 2021-01-28 NOTE — DISCHARGE NOTE PROVIDER - HOSPITAL COURSE
Pt with right sided colon cancer, s/p Lap right hemicolectomy, postop course uneventful with pain control and return of bowel function.

## 2021-01-28 NOTE — PROGRESS NOTE ADULT - ASSESSMENT
54 yo M s/p right laparoscopic hemicolectomy POD - 1    Plan:   monitor vitals   pain control   nausea control   Diet: FLD, ADAT   encourage IS use  encourage OOB/A  DVT/GI ppx   DC dunn void check     Plan discussed with attending

## 2021-01-28 NOTE — DISCHARGE NOTE PROVIDER - NSDCMRMEDTOKEN_GEN_ALL_CORE_FT
Crestor 20 mg oral tablet: 1 tab(s) orally once a day  lisinopril 10 mg oral tablet: 1 tab(s) orally once a day (at bedtime)  Multiple Vitamins oral tablet: 1 tab(s) orally once a day  oxycodone-acetaminophen 5 mg-325 mg oral tablet: 1 tab(s) orally every 6 hours, As Needed -for moderate pain [id number]  vitamin C: 500 milligram(s) orally once a day  Vitamin D3 1000 intl units (25 mcg) oral capsule: 2 cap(s) orally once a day

## 2021-01-28 NOTE — DISCHARGE NOTE PROVIDER - CARE PROVIDER_API CALL
Augustine Vasquez)  ColonRectal Surgery; Surgery  [address] [place] [address]  Phone: [phone]  Fax: [phone]  Follow Up Time:

## 2021-01-29 ENCOUNTER — TRANSCRIPTION ENCOUNTER (OUTPATIENT)
Age: 56
End: 2021-01-29

## 2021-01-29 VITALS
TEMPERATURE: 98 F | RESPIRATION RATE: 16 BRPM | OXYGEN SATURATION: 96 % | SYSTOLIC BLOOD PRESSURE: 134 MMHG | DIASTOLIC BLOOD PRESSURE: 70 MMHG | HEART RATE: 89 BPM

## 2021-01-29 LAB
ANION GAP SERPL CALC-SCNC: 5 MMOL/L — SIGNIFICANT CHANGE UP (ref 5–17)
BASOPHILS # BLD AUTO: 0.03 K/UL — SIGNIFICANT CHANGE UP (ref 0–0.2)
BASOPHILS NFR BLD AUTO: 0.3 % — SIGNIFICANT CHANGE UP (ref 0–2)
BUN SERPL-MCNC: 12 MG/DL — SIGNIFICANT CHANGE UP (ref 7–23)
CALCIUM SERPL-MCNC: 9.3 MG/DL — SIGNIFICANT CHANGE UP (ref 8.5–10.1)
CHLORIDE SERPL-SCNC: 105 MMOL/L — SIGNIFICANT CHANGE UP (ref 96–108)
CO2 SERPL-SCNC: 28 MMOL/L — SIGNIFICANT CHANGE UP (ref 22–31)
CREAT SERPL-MCNC: 0.83 MG/DL — SIGNIFICANT CHANGE UP (ref 0.5–1.3)
EOSINOPHIL # BLD AUTO: 0.04 K/UL — SIGNIFICANT CHANGE UP (ref 0–0.5)
EOSINOPHIL NFR BLD AUTO: 0.4 % — SIGNIFICANT CHANGE UP (ref 0–6)
GLUCOSE SERPL-MCNC: 157 MG/DL — HIGH (ref 70–99)
HCT VFR BLD CALC: 36 % — LOW (ref 39–50)
HGB BLD-MCNC: 10.5 G/DL — LOW (ref 13–17)
IMM GRANULOCYTES NFR BLD AUTO: 0.4 % — SIGNIFICANT CHANGE UP (ref 0–1.5)
LYMPHOCYTES # BLD AUTO: 1.12 K/UL — SIGNIFICANT CHANGE UP (ref 1–3.3)
LYMPHOCYTES # BLD AUTO: 10.9 % — LOW (ref 13–44)
MAGNESIUM SERPL-MCNC: 2 MG/DL — SIGNIFICANT CHANGE UP (ref 1.6–2.6)
MCHC RBC-ENTMCNC: 21.4 PG — LOW (ref 27–34)
MCHC RBC-ENTMCNC: 29.2 GM/DL — LOW (ref 32–36)
MCV RBC AUTO: 73.3 FL — LOW (ref 80–100)
MONOCYTES # BLD AUTO: 0.81 K/UL — SIGNIFICANT CHANGE UP (ref 0–0.9)
MONOCYTES NFR BLD AUTO: 7.9 % — SIGNIFICANT CHANGE UP (ref 2–14)
NEUTROPHILS # BLD AUTO: 8.26 K/UL — HIGH (ref 1.8–7.4)
NEUTROPHILS NFR BLD AUTO: 80.1 % — HIGH (ref 43–77)
PHOSPHATE SERPL-MCNC: 2.8 MG/DL — SIGNIFICANT CHANGE UP (ref 2.5–4.5)
PLATELET # BLD AUTO: 199 K/UL — SIGNIFICANT CHANGE UP (ref 150–400)
POTASSIUM SERPL-MCNC: 4 MMOL/L — SIGNIFICANT CHANGE UP (ref 3.5–5.3)
POTASSIUM SERPL-SCNC: 4 MMOL/L — SIGNIFICANT CHANGE UP (ref 3.5–5.3)
RBC # BLD: 4.91 M/UL — SIGNIFICANT CHANGE UP (ref 4.2–5.8)
RBC # FLD: 18.2 % — HIGH (ref 10.3–14.5)
SODIUM SERPL-SCNC: 138 MMOL/L — SIGNIFICANT CHANGE UP (ref 135–145)
WBC # BLD: 10.3 K/UL — SIGNIFICANT CHANGE UP (ref 3.8–10.5)
WBC # FLD AUTO: 10.3 K/UL — SIGNIFICANT CHANGE UP (ref 3.8–10.5)

## 2021-01-29 RX ORDER — ACETAMINOPHEN 500 MG
2 TABLET ORAL
Qty: 56 | Refills: 0
Start: 2021-01-29 | End: 2021-02-04

## 2021-01-29 RX ORDER — SIMETHICONE 80 MG/1
80 TABLET, CHEWABLE ORAL ONCE
Refills: 0 | Status: COMPLETED | OUTPATIENT
Start: 2021-01-29 | End: 2021-01-29

## 2021-01-29 RX ORDER — SIMETHICONE 80 MG/1
1 TABLET, CHEWABLE ORAL
Qty: 10 | Refills: 0
Start: 2021-01-29

## 2021-01-29 RX ORDER — IBUPROFEN 200 MG
1 TABLET ORAL
Qty: 28 | Refills: 0
Start: 2021-01-29 | End: 2021-02-04

## 2021-01-29 RX ADMIN — SIMETHICONE 80 MILLIGRAM(S): 80 TABLET, CHEWABLE ORAL at 06:55

## 2021-01-29 RX ADMIN — SODIUM CHLORIDE 3 MILLILITER(S): 9 INJECTION INTRAMUSCULAR; INTRAVENOUS; SUBCUTANEOUS at 05:09

## 2021-01-29 RX ADMIN — HEPARIN SODIUM 5000 UNIT(S): 5000 INJECTION INTRAVENOUS; SUBCUTANEOUS at 05:10

## 2021-01-29 RX ADMIN — ALVIMOPAN 12 MILLIGRAM(S): 12 CAPSULE ORAL at 09:00

## 2021-01-29 NOTE — DISCHARGE NOTE NURSING/CASE MANAGEMENT/SOCIAL WORK - PATIENT PORTAL LINK FT
You can access the FollowMyHealth Patient Portal offered by [facility] by registering at the following website: [email or web address]. By joining [employer]’s FollowMyHealth portal, you will also be able to view your health information using other applications (apps) compatible with our system.

## 2021-01-29 NOTE — PROGRESS NOTE ADULT - ASSESSMENT
56 yo M s/p right laparoscopic hemicolectomy POD - 2    Plan:   monitor vitals   pain control   nausea control   Diet: LFD   encourage IS use  encourage OOB/A  DVT/GI ppx   DC today     Plan discussed with attending

## 2021-01-29 NOTE — PROGRESS NOTE ADULT - SUBJECTIVE AND OBJECTIVE BOX
CC:Patient is a 55y old  Male who presents with a chief complaint of     Subjective:  Pt seen and examined at bedside. Pt is AAOx3, pt in no acute distress. Pt denies fever, chills, chest pain, SOB, abd pain, N/V/D, extremity pain or dysfunction, hemoptysis, hematemesis, hematuria, hematochexia, headache, diplopia, vertigo, dizzyness. reports Bm and flatus. patient tolerate diet     ROS:      Vital Signs Last 24 Hrs  T(C): 36.7 (28 Jan 2021 05:23), Max: 37.3 (27 Jan 2021 16:10)  T(F): 98 (28 Jan 2021 05:23), Max: 99.1 (27 Jan 2021 16:10)  HR: 75 (28 Jan 2021 05:23) (75 - 107)  BP: 100/56 (28 Jan 2021 05:23) (100/56 - 156/70)  BP(mean): --  RR: 18 (28 Jan 2021 05:23) (12 - 23)  SpO2: 95% (28 Jan 2021 05:23) (95% - 100%)    Labs:                                10.8   12.91 )-----------( 237      ( 28 Jan 2021 08:17 )             36.1   01-28    139  |  107  |  11  ----------------------------<  123<H>  4.2   |  27  |  1.26    Ca    9.6      28 Jan 2021 08:17  Phos  3.8     01-28  Mg     2.2     01-28                      Meds:  acetaminophen   Tablet .. 650 milliGRAM(s) Oral every 6 hours PRN  acetaminophen  IVPB .. 1000 milliGRAM(s) IV Intermittent every 6 hours  alvimopan 12 milliGRAM(s) Oral two times a day  atorvastatin 80 milliGRAM(s) Oral at bedtime  cefoTEtan  IVPB 2 Gram(s) IV Intermittent every 12 hours  heparin   Injectable 5000 Unit(s) SubCutaneous every 8 hours  ketorolac   Injectable 15 milliGRAM(s) IV Push every 6 hours  lactated ringers. 1000 milliLiter(s) IV Continuous <Continuous>  lisinopril 10 milliGRAM(s) Oral daily  multivitamin 1 Tablet(s) Oral daily  ondansetron Injectable 4 milliGRAM(s) IV Push every 6 hours PRN  oxyCODONE    IR 5 milliGRAM(s) Oral every 4 hours PRN  oxyCODONE    IR 10 milliGRAM(s) Oral every 4 hours PRN  sodium chloride 0.9% lock flush 3 milliLiter(s) IV Push every 8 hours      Radiology:      Physical exam:  Pt is aaox3  Pt in no acute distress  Psych: normal affect  Resp: CTAB  CVS: S1S2(+)  ABD: bowel sounds (+), soft, non distended, no rebound, no guarding, no rigidity, dressing in place   EXT: no calf tenderness or edema to exam b/l, on VTE prophylaxis  Skin: no adverse skin changes to exam

## 2021-02-03 ENCOUNTER — NON-APPOINTMENT (OUTPATIENT)
Age: 56
End: 2021-02-03

## 2021-02-03 DIAGNOSIS — C18.2 MALIGNANT NEOPLASM OF ASCENDING COLON: ICD-10-CM

## 2021-02-03 DIAGNOSIS — D50.0 IRON DEFICIENCY ANEMIA SECONDARY TO BLOOD LOSS (CHRONIC): ICD-10-CM

## 2021-02-04 DIAGNOSIS — S83.207D UNSPECIFIED TEAR OF UNSPECIFIED MENISCUS, CURRENT INJURY, LEFT KNEE, SUBSEQUENT ENCOUNTER: ICD-10-CM

## 2021-02-04 DIAGNOSIS — M25.562 PAIN IN LEFT KNEE: ICD-10-CM

## 2021-02-04 DIAGNOSIS — K63.89 OTHER SPECIFIED DISEASES OF INTESTINE: ICD-10-CM

## 2021-02-04 DIAGNOSIS — Z01.818 ENCOUNTER FOR OTHER PREPROCEDURAL EXAMINATION: ICD-10-CM

## 2021-02-04 DIAGNOSIS — I10 ESSENTIAL (PRIMARY) HYPERTENSION: ICD-10-CM

## 2021-02-04 DIAGNOSIS — E78.00 PURE HYPERCHOLESTEROLEMIA, UNSPECIFIED: ICD-10-CM

## 2021-02-04 RX ORDER — METRONIDAZOLE 500 MG/1
500 TABLET ORAL
Qty: 3 | Refills: 0 | Status: DISCONTINUED | COMMUNITY
Start: 2021-01-08 | End: 2021-02-04

## 2021-02-04 RX ORDER — NEOMYCIN SULFATE 500 MG/1
500 TABLET ORAL
Qty: 6 | Refills: 0 | Status: DISCONTINUED | COMMUNITY
Start: 2021-01-08 | End: 2021-02-04

## 2021-02-08 ENCOUNTER — OUTPATIENT (OUTPATIENT)
Dept: OUTPATIENT SERVICES | Facility: HOSPITAL | Age: 56
LOS: 1 days | Discharge: HOME | End: 2021-02-08

## 2021-02-08 DIAGNOSIS — Z98.890 OTHER SPECIFIED POSTPROCEDURAL STATES: Chronic | ICD-10-CM

## 2021-02-08 DIAGNOSIS — C18.9 MALIGNANT NEOPLASM OF COLON, UNSPECIFIED: ICD-10-CM

## 2021-02-10 ENCOUNTER — APPOINTMENT (OUTPATIENT)
Dept: HEMATOLOGY ONCOLOGY | Facility: CLINIC | Age: 56
End: 2021-02-10
Payer: COMMERCIAL

## 2021-02-10 VITALS
HEIGHT: 68 IN | WEIGHT: 227 LBS | SYSTOLIC BLOOD PRESSURE: 137 MMHG | HEART RATE: 93 BPM | DIASTOLIC BLOOD PRESSURE: 76 MMHG | TEMPERATURE: 97.3 F | BODY MASS INDEX: 34.4 KG/M2 | RESPIRATION RATE: 16 BRPM

## 2021-02-10 DIAGNOSIS — Z80.52 FAMILY HISTORY OF MALIGNANT NEOPLASM OF BLADDER: ICD-10-CM

## 2021-02-10 DIAGNOSIS — Z80.3 FAMILY HISTORY OF MALIGNANT NEOPLASM OF BREAST: ICD-10-CM

## 2021-02-10 DIAGNOSIS — Z80.0 FAMILY HISTORY OF MALIGNANT NEOPLASM OF DIGESTIVE ORGANS: ICD-10-CM

## 2021-02-10 PROCEDURE — 99205 OFFICE O/P NEW HI 60 MIN: CPT

## 2021-02-11 PROBLEM — Z80.0 FAMILY HISTORY OF PANCREATIC CANCER: Status: ACTIVE | Noted: 2021-02-11

## 2021-02-11 PROBLEM — Z80.52 FAMILY HISTORY OF MALIGNANT NEOPLASM OF URINARY BLADDER: Status: ACTIVE | Noted: 2021-02-11

## 2021-02-11 PROBLEM — Z80.3 FAMILY HISTORY OF BREAST CANCER: Status: ACTIVE | Noted: 2021-02-11

## 2021-02-11 NOTE — REASON FOR VISIT
[Initial Consultation] : an initial consultation [FreeTextEntry2] : colon cancer  here to discuss adjuvant therapy

## 2021-02-11 NOTE — ASSESSMENT
[FreeTextEntry1] : 54 y/o male with stage II B  T3, N0 cancer of proximal transverse colon s/p  R hemicolectomy 1/27/21 .\par High risk features : poorly differentiated histology with LVI. Tumor is MSI- stable. \par Patient is young and in excellent health.  He wants to be aggressive about his medical care. \par Discussed rationale, logistics and potential side effects of adjuvant 5 Fu based chemotherapy. Explained that for high risk stage II colon cancer survival benefit is no more that ~ 5 % at 5 years. His disease is MSI stable and will benefit from 5 Fu chemotherapy. Statistical benefit from oxaliplatin was not demonstrated in stage II disease although could be considered for highest risk patient ( like large T4 tumors or perforated tumors). Oxaliplatin carries a risk of potentially permanent neuropathy.\par Discussed above with patient and his wife  in details, answered multiple questions.\par \par Recommend : adjuvant chemotherapy with capecitabine x 6 months.\par Patient is inclined to take chemotherapy but not decided yet. he will call me back in next few days with his decision.\par \par \par CC: Dr Evaristo Lizama

## 2021-02-11 NOTE — HISTORY OF PRESENT ILLNESS
[Disease: _____________________] : Disease: [unfilled] [T: ___] : T[unfilled] [N: ___] : N[unfilled] [AJCC Stage: ____] : AJCC Stage: [unfilled] [de-identified] : 54 y/o male  found to have mild iron deficiency anemia of routine blood work. Colonoscopy ( Dr Rosas) revealed a large  tumor in the proximal transverse colon. Patient had no GI symptoms.\par CT CAP  1/14/21 : no evidence  of metastatic disease.\par \par 1/27/21 R hemicolectomy : poorly diff adenocarcinoma 2.5 cm infiltrates into pericolonic tissue LVI +  0/14 LN .\par \par He recovered form surgery very well. Bowel movement normal daily.\par Feels " wonderful".\par \par  [de-identified] : poorly differentiated adenocarcinoma G 3  [de-identified] : MMRP  intact   MSI  stable

## 2021-02-11 NOTE — PHYSICAL EXAM
[Fully active, able to carry on all pre-disease performance without restriction] : Status 0 - Fully active, able to carry on all pre-disease performance without restriction [Normal] : affect appropriate [de-identified] : looks well  [de-identified] : healing surgical incision

## 2021-02-11 NOTE — CONSULT LETTER
[Dear  ___] : Dear ~MICAELA, [( Thank you for referring [unfilled] for consultation for _____ )] : Thank you for referring [unfilled] for consultation for [unfilled] [Please see my note below.] : Please see my note below. [Consult Closing:] : Thank you very much for allowing me to participate in the care of this patient.  If you have any questions, please do not hesitate to contact me. [Sincerely,] : Sincerely, [FreeTextEntry2] : Dr Dick Vasquez [FreeTextEntry3] : Lula Sumner

## 2021-02-16 ENCOUNTER — APPOINTMENT (OUTPATIENT)
Dept: COLORECTAL SURGERY | Facility: CLINIC | Age: 56
End: 2021-02-16
Payer: COMMERCIAL

## 2021-02-16 VITALS
RESPIRATION RATE: 16 BRPM | HEART RATE: 86 BPM | BODY MASS INDEX: 34.4 KG/M2 | WEIGHT: 227 LBS | DIASTOLIC BLOOD PRESSURE: 87 MMHG | SYSTOLIC BLOOD PRESSURE: 127 MMHG | TEMPERATURE: 97.7 F | HEIGHT: 68 IN

## 2021-02-16 PROCEDURE — 99024 POSTOP FOLLOW-UP VISIT: CPT

## 2021-02-16 NOTE — PHYSICAL EXAM
[Respiratory Effort] : normal respiratory effort [Calm] : calm [de-identified] : Soft, nontender, nondistended.  Well-healed surgical incisions and staples removed. [de-identified] : Well-appearing, in no distress [de-identified] : Normocephalic, atraumatic [de-identified] : Moves extremities without difficulty [de-identified] : Warm and dry [de-identified] : Alert and oriented x3

## 2021-02-16 NOTE — HISTORY OF PRESENT ILLNESS
[FreeTextEntry1] : 55-year-old male who presents for a postoperative visit after undergoing a laparoscopic right colectomy for cancer in the transverse colon.  He is recovering very well from surgery.  He denies any fevers, chills, nausea or vomiting.  He is having bowel movements without difficulty.

## 2021-02-18 ENCOUNTER — RESULT REVIEW (OUTPATIENT)
Age: 56
End: 2021-02-18

## 2021-02-23 ENCOUNTER — RESULT REVIEW (OUTPATIENT)
Age: 56
End: 2021-02-23

## 2021-02-23 ENCOUNTER — APPOINTMENT (OUTPATIENT)
Dept: HEMATOLOGY ONCOLOGY | Facility: CLINIC | Age: 56
End: 2021-02-23
Payer: COMMERCIAL

## 2021-02-23 VITALS
WEIGHT: 232.2 LBS | TEMPERATURE: 97.3 F | BODY MASS INDEX: 35.31 KG/M2 | HEART RATE: 90 BPM | SYSTOLIC BLOOD PRESSURE: 127 MMHG | DIASTOLIC BLOOD PRESSURE: 77 MMHG

## 2021-02-23 LAB
BASOPHILS # BLD AUTO: 0.04 K/UL — SIGNIFICANT CHANGE UP (ref 0–0.2)
BASOPHILS NFR BLD AUTO: 0.6 % — SIGNIFICANT CHANGE UP (ref 0–2)
EOSINOPHIL # BLD AUTO: 0.27 K/UL — SIGNIFICANT CHANGE UP (ref 0–0.5)
EOSINOPHIL NFR BLD AUTO: 4.3 % — SIGNIFICANT CHANGE UP (ref 0–6)
HCT VFR BLD CALC: 37.4 % — LOW (ref 39–50)
HGB BLD-MCNC: 11.4 G/DL — LOW (ref 13–17)
IMM GRANULOCYTES NFR BLD AUTO: 0.2 % — SIGNIFICANT CHANGE UP (ref 0–1.5)
LYMPHOCYTES # BLD AUTO: 1.68 K/UL — SIGNIFICANT CHANGE UP (ref 1–3.3)
LYMPHOCYTES # BLD AUTO: 26.6 % — SIGNIFICANT CHANGE UP (ref 13–44)
MCHC RBC-ENTMCNC: 21.9 PG — LOW (ref 27–34)
MCHC RBC-ENTMCNC: 30.5 GM/DL — LOW (ref 32–36)
MCV RBC AUTO: 71.8 FL — LOW (ref 80–100)
MONOCYTES # BLD AUTO: 0.61 K/UL — SIGNIFICANT CHANGE UP (ref 0–0.9)
MONOCYTES NFR BLD AUTO: 9.7 % — SIGNIFICANT CHANGE UP (ref 2–14)
NEUTROPHILS # BLD AUTO: 3.71 K/UL — SIGNIFICANT CHANGE UP (ref 1.8–7.4)
NEUTROPHILS NFR BLD AUTO: 58.6 % — SIGNIFICANT CHANGE UP (ref 43–77)
NRBC # BLD: 0 /100 WBCS — SIGNIFICANT CHANGE UP (ref 0–0)
PLATELET # BLD AUTO: 227 K/UL — SIGNIFICANT CHANGE UP (ref 150–400)
RBC # BLD: 5.21 M/UL — SIGNIFICANT CHANGE UP (ref 4.2–5.8)
RBC # FLD: 19.3 % — HIGH (ref 10.3–14.5)
WBC # BLD: 6.32 K/UL — SIGNIFICANT CHANGE UP (ref 3.8–10.5)
WBC # FLD AUTO: 6.32 K/UL — SIGNIFICANT CHANGE UP (ref 3.8–10.5)

## 2021-02-23 PROCEDURE — 99215 OFFICE O/P EST HI 40 MIN: CPT

## 2021-02-23 RX ORDER — UBIDECARENONE/VIT E ACET 100MG-5
CAPSULE ORAL
Refills: 0 | Status: ACTIVE | COMMUNITY

## 2021-02-24 LAB
ALBUMIN SERPL ELPH-MCNC: 4.3 G/DL — SIGNIFICANT CHANGE UP (ref 3.3–5)
ALP SERPL-CCNC: 66 U/L — SIGNIFICANT CHANGE UP (ref 40–120)
ALT FLD-CCNC: 29 U/L — SIGNIFICANT CHANGE UP (ref 10–45)
ANION GAP SERPL CALC-SCNC: 14 MMOL/L — SIGNIFICANT CHANGE UP (ref 5–17)
AST SERPL-CCNC: 20 U/L — SIGNIFICANT CHANGE UP (ref 10–40)
BILIRUB SERPL-MCNC: 0.3 MG/DL — SIGNIFICANT CHANGE UP (ref 0.2–1.2)
BUN SERPL-MCNC: 15 MG/DL — SIGNIFICANT CHANGE UP (ref 7–23)
CALCIUM SERPL-MCNC: 9.3 MG/DL — SIGNIFICANT CHANGE UP (ref 8.4–10.5)
CEA SERPL-MCNC: 7 NG/ML — HIGH (ref 0–3.8)
CHLORIDE SERPL-SCNC: 101 MMOL/L — SIGNIFICANT CHANGE UP (ref 96–108)
CO2 SERPL-SCNC: 24 MMOL/L — SIGNIFICANT CHANGE UP (ref 22–31)
CREAT SERPL-MCNC: 0.89 MG/DL — SIGNIFICANT CHANGE UP (ref 0.5–1.3)
FERRITIN SERPL-MCNC: 16 NG/ML — LOW (ref 30–400)
FOLATE SERPL-MCNC: 14.1 NG/ML — SIGNIFICANT CHANGE UP
GLUCOSE SERPL-MCNC: 191 MG/DL — HIGH (ref 70–99)
IRON SATN MFR SERPL: 33 UG/DL — LOW (ref 45–165)
IRON SATN MFR SERPL: 9 % — LOW (ref 16–55)
POTASSIUM SERPL-MCNC: 4 MMOL/L — SIGNIFICANT CHANGE UP (ref 3.5–5.3)
POTASSIUM SERPL-SCNC: 4 MMOL/L — SIGNIFICANT CHANGE UP (ref 3.5–5.3)
PROT SERPL-MCNC: 6.9 G/DL — SIGNIFICANT CHANGE UP (ref 6–8.3)
SODIUM SERPL-SCNC: 139 MMOL/L — SIGNIFICANT CHANGE UP (ref 135–145)
TIBC SERPL-MCNC: 381 UG/DL — SIGNIFICANT CHANGE UP (ref 220–430)
UIBC SERPL-MCNC: 348 UG/DL — SIGNIFICANT CHANGE UP (ref 110–370)
VIT B12 SERPL-MCNC: 486 PG/ML — SIGNIFICANT CHANGE UP (ref 232–1245)

## 2021-02-26 ENCOUNTER — LABORATORY RESULT (OUTPATIENT)
Age: 56
End: 2021-02-26

## 2021-02-26 ENCOUNTER — APPOINTMENT (OUTPATIENT)
Dept: HEMATOLOGY ONCOLOGY | Facility: CLINIC | Age: 56
End: 2021-02-26

## 2021-03-03 ENCOUNTER — APPOINTMENT (OUTPATIENT)
Dept: INFUSION THERAPY | Facility: CLINIC | Age: 56
End: 2021-03-03

## 2021-03-03 ENCOUNTER — NON-APPOINTMENT (OUTPATIENT)
Age: 56
End: 2021-03-03

## 2021-03-03 VITALS
DIASTOLIC BLOOD PRESSURE: 75 MMHG | TEMPERATURE: 97.1 F | WEIGHT: 233 LBS | RESPIRATION RATE: 16 BRPM | HEART RATE: 83 BPM | SYSTOLIC BLOOD PRESSURE: 133 MMHG | BODY MASS INDEX: 35.31 KG/M2 | HEIGHT: 68 IN

## 2021-03-04 DIAGNOSIS — D50.9 IRON DEFICIENCY ANEMIA, UNSPECIFIED: ICD-10-CM

## 2021-03-10 ENCOUNTER — APPOINTMENT (OUTPATIENT)
Dept: INFUSION THERAPY | Facility: CLINIC | Age: 56
End: 2021-03-10

## 2021-03-10 VITALS
HEART RATE: 73 BPM | SYSTOLIC BLOOD PRESSURE: 130 MMHG | TEMPERATURE: 97.2 F | BODY MASS INDEX: 35.46 KG/M2 | DIASTOLIC BLOOD PRESSURE: 73 MMHG | WEIGHT: 234 LBS | HEIGHT: 68 IN | RESPIRATION RATE: 16 BRPM

## 2021-03-13 ENCOUNTER — OUTPATIENT (OUTPATIENT)
Dept: OUTPATIENT SERVICES | Facility: HOSPITAL | Age: 56
LOS: 1 days | Discharge: HOME | End: 2021-03-13

## 2021-03-13 DIAGNOSIS — Z98.890 OTHER SPECIFIED POSTPROCEDURAL STATES: Chronic | ICD-10-CM

## 2021-03-13 DIAGNOSIS — C18.9 MALIGNANT NEOPLASM OF COLON, UNSPECIFIED: ICD-10-CM

## 2021-03-16 ENCOUNTER — NON-APPOINTMENT (OUTPATIENT)
Age: 56
End: 2021-03-16

## 2021-03-22 ENCOUNTER — RESULT REVIEW (OUTPATIENT)
Age: 56
End: 2021-03-22

## 2021-03-22 ENCOUNTER — APPOINTMENT (OUTPATIENT)
Dept: HEMATOLOGY ONCOLOGY | Facility: CLINIC | Age: 56
End: 2021-03-22
Payer: COMMERCIAL

## 2021-03-22 VITALS
RESPIRATION RATE: 16 BRPM | WEIGHT: 239 LBS | DIASTOLIC BLOOD PRESSURE: 74 MMHG | HEIGHT: 68 IN | TEMPERATURE: 97.8 F | BODY MASS INDEX: 36.22 KG/M2 | SYSTOLIC BLOOD PRESSURE: 153 MMHG | HEART RATE: 86 BPM

## 2021-03-22 PROCEDURE — 99213 OFFICE O/P EST LOW 20 MIN: CPT

## 2021-03-22 RX ORDER — ASCORBIC ACID 500 MG
500 TABLET ORAL
Refills: 0 | Status: DISCONTINUED | COMMUNITY
End: 2021-03-22

## 2021-03-22 NOTE — PHYSICAL EXAM
[Normal] : affect appropriate [de-identified] : Overweight middle aged male [de-identified] : anicteric [de-identified] : no rashes, no HFS.  Small raised bump on tip of right thumb.

## 2021-03-22 NOTE — HISTORY OF PRESENT ILLNESS
[Disease: _____________________] : Disease: [unfilled] [T: ___] : T[unfilled] [N: ___] : N[unfilled] [AJCC Stage: ____] : AJCC Stage: [unfilled] [de-identified] : CHILO PARRA is a 55 y.o. with a PMH significant for PMH and HLD, who we are following for a stage IIA colon cancer.  \par Presented with iron deficiency anemia of routine blood work and was referred for a GI evaluation.  Patient had no GI symptoms.\par 1/8/21 - Colonoscopy (Dr. Rosas)  - showed a large ulcerated tumor in the proximal transverse colon. Mas was partially circumferential (1/3 of lumen circumference) and measured 6cm in length and 5mm in diameter. Also had ascending colon polyp which was benign.   \par Path - very minute fragment of colon mucosa with at least intramucosal adenocarcinoma with focus suspicious for LVI.   More advanced lesion could not be excluded.  \par EGD same day unremarkable. \par 1/14/21 - CT CAP - no evidence of metastatic disease.\par 1/27/21 - Right hemicolectomy - poorly diff adenocarcinoma 2.5 cm infiltrates into pericolonic tissue LVI +  0/14 LN .\par MMR intact.   pT3pN0 = Stage IIA\par Plan is for adjuvant chemotherapy with capecitabine x 6 months.  \par Had IV iron x 2 for iron deficiency anemia.\par 3/4/21 - Started Xeloda.  [de-identified] : poorly diff adenocarcinoma  [de-identified] : Patient states he tolerated the Xeloda very well - no issues at all.  \angel s/p IV Feraheme x 2, last 3/10/21 - reports doesn't really notice any increase in energy since getting. \angel Reports spoke with dietician earlier today and she recommended he stop Vitamin C completely.  He will hold until the Xeloda is completed.  \angel Mentions that he did have a splinter in his right thumb awhile ago - thought he got it all out but since then he has noticed a lump on his thumb where the splinter was.  He now thinks the splinter is still in there and the thumb is forming scar tissue around it. \angel Denies any other changes in his family, medical, or social history since his last visit of 2/10/21.

## 2021-03-22 NOTE — ASSESSMENT
[FreeTextEntry1] : Patient is a 56 y.o. with a stage IIA colon cancer. MMRP intact, proximal transverse colon, s/p right hemicolectomy for pT3,pN0 disease. \par 3/4/21 - Started on Adjuvant Xeloda.  \par \par 1. Onc - Tolerating Xeloda very well - no problems.  Plan to continue for 6 months. \par Told OK to have PCP look at thumb. \par Reinforced skin care to prevent HFS. \par 2. Iron deficiency anemia - s/p Feraheme x 2 - Hgb now up by 2gm/dl. \par Patient instructed to RTO in 6 weeks - will repeat iron studies at that time.

## 2021-03-23 ENCOUNTER — NON-APPOINTMENT (OUTPATIENT)
Age: 56
End: 2021-03-23

## 2021-03-23 DIAGNOSIS — Z79.899 OTHER LONG TERM (CURRENT) DRUG THERAPY: ICD-10-CM

## 2021-03-23 DIAGNOSIS — C18.4 MALIGNANT NEOPLASM OF TRANSVERSE COLON: ICD-10-CM

## 2021-03-23 DIAGNOSIS — D50.9 IRON DEFICIENCY ANEMIA, UNSPECIFIED: ICD-10-CM

## 2021-04-30 ENCOUNTER — OUTPATIENT (OUTPATIENT)
Dept: OUTPATIENT SERVICES | Facility: HOSPITAL | Age: 56
LOS: 1 days | Discharge: HOME | End: 2021-04-30

## 2021-04-30 DIAGNOSIS — Z98.890 OTHER SPECIFIED POSTPROCEDURAL STATES: Chronic | ICD-10-CM

## 2021-04-30 DIAGNOSIS — C18.9 MALIGNANT NEOPLASM OF COLON, UNSPECIFIED: ICD-10-CM

## 2021-05-03 ENCOUNTER — RESULT REVIEW (OUTPATIENT)
Age: 56
End: 2021-05-03

## 2021-05-03 ENCOUNTER — APPOINTMENT (OUTPATIENT)
Dept: HEMATOLOGY ONCOLOGY | Facility: CLINIC | Age: 56
End: 2021-05-03
Payer: COMMERCIAL

## 2021-05-03 VITALS
SYSTOLIC BLOOD PRESSURE: 135 MMHG | BODY MASS INDEX: 36.05 KG/M2 | WEIGHT: 237.1 LBS | HEART RATE: 70 BPM | DIASTOLIC BLOOD PRESSURE: 87 MMHG

## 2021-05-03 PROCEDURE — 99213 OFFICE O/P EST LOW 20 MIN: CPT

## 2021-05-03 NOTE — ASSESSMENT
[FreeTextEntry1] : 57 y/o male with stage II B  T3, N0 cancer of proximal transverse colon s/p  R hemicolectomy 1/27/21 .\par High risk features : poorly differentiated histology with LVI. Tumor is MSI- stable. \par \par Currently on adjuvant capecitabine -started 3/4/21.\par \par Tolerating very well.\par Cycle # 4 will start this week.\par \par Follow up labs/ CEA due. \par Scans yearly/ sooner PRN.\par \par Colonoscopy per GI.\par \par Iron deficiency- s/p iv iron- follow iron studies.\par \par Return visit in 2 months. \par \par

## 2021-05-03 NOTE — HISTORY OF PRESENT ILLNESS
[Disease: _____________________] : Disease: [unfilled] [T: ___] : T[unfilled] [N: ___] : N[unfilled] [AJCC Stage: ____] : AJCC Stage: [unfilled] [de-identified] : 56 y/o male  found to have mild iron deficiency anemia of routine blood work. Colonoscopy ( Dr Rosas) revealed a large  tumor in the proximal transverse colon. Patient had no GI symptoms.\par CT CAP  1/14/21 : no evidence  of metastatic disease.\par \par 1/27/21 R hemicolectomy : poorly diff adenocarcinoma 2.5 cm infiltrates into pericolonic tissue LVI +  0/14 LN .\par \par Adjuvant capecitabine started 3/4/21\par \par s/p iv iron for BELINDA \par \par  [de-identified] : poorly differentiated adenocarcinoma G 3  [de-identified] : MMRP  intact   MSI  stable  [de-identified] : s/p 3 cycles of capecitabine. next cycle will start Th 5/6. Tolerating very well. No GI c/o. No hand foot sx He is using moisturizers and sun protection. \par S/p iv iron in [facility].

## 2021-05-04 DIAGNOSIS — C18.4 MALIGNANT NEOPLASM OF TRANSVERSE COLON: ICD-10-CM

## 2021-05-04 DIAGNOSIS — Z79.899 OTHER LONG TERM (CURRENT) DRUG THERAPY: ICD-10-CM

## 2021-05-04 DIAGNOSIS — D50.9 IRON DEFICIENCY ANEMIA, UNSPECIFIED: ICD-10-CM

## 2021-05-04 LAB — CEA SERPL-MCNC: 7.8 NG/ML — HIGH (ref 0–3.8)

## 2021-05-06 ENCOUNTER — RESULT REVIEW (OUTPATIENT)
Age: 56
End: 2021-05-06

## 2021-05-07 LAB — SURGICAL PATHOLOGY STUDY: SIGNIFICANT CHANGE UP

## 2021-06-22 ENCOUNTER — OUTPATIENT (OUTPATIENT)
Dept: OUTPATIENT SERVICES | Facility: HOSPITAL | Age: 56
LOS: 1 days | Discharge: HOME | End: 2021-06-22

## 2021-06-22 DIAGNOSIS — C18.9 MALIGNANT NEOPLASM OF COLON, UNSPECIFIED: ICD-10-CM

## 2021-06-22 DIAGNOSIS — Z98.890 OTHER SPECIFIED POSTPROCEDURAL STATES: Chronic | ICD-10-CM

## 2021-06-24 ENCOUNTER — RESULT REVIEW (OUTPATIENT)
Age: 56
End: 2021-06-24

## 2021-06-24 ENCOUNTER — APPOINTMENT (OUTPATIENT)
Dept: HEMATOLOGY ONCOLOGY | Facility: CLINIC | Age: 56
End: 2021-06-24
Payer: COMMERCIAL

## 2021-06-24 VITALS
DIASTOLIC BLOOD PRESSURE: 81 MMHG | HEART RATE: 103 BPM | SYSTOLIC BLOOD PRESSURE: 135 MMHG | WEIGHT: 234 LBS | TEMPERATURE: 98.1 F | BODY MASS INDEX: 35.58 KG/M2

## 2021-06-24 DIAGNOSIS — Z86.2 PERSONAL HISTORY OF DISEASES OF THE BLOOD AND BLOOD-FORMING ORGANS AND CERTAIN DISORDERS INVOLVING THE IMMUNE MECHANISM: ICD-10-CM

## 2021-06-24 LAB — CEA SERPL-MCNC: 7.9 NG/ML — HIGH (ref 0–3.8)

## 2021-06-24 PROCEDURE — 99214 OFFICE O/P EST MOD 30 MIN: CPT

## 2021-06-24 NOTE — HISTORY OF PRESENT ILLNESS
[Disease: _____________________] : Disease: [unfilled] [T: ___] : T[unfilled] [N: ___] : N[unfilled] [AJCC Stage: ____] : AJCC Stage: [unfilled] [de-identified] : CHILO PARRA is a 56 y.o. with a PMH significant for HTN and HLD, who we are following for a stage IIA colon cancer.  \par \par Presented with iron deficiency anemia of routine blood work and was referred for a GI evaluation.  Patient had no GI symptoms.\par 1/8/21 - Colonoscopy (Dr. Rosas)  - showed a large ulcerated tumor in the proximal transverse colon. Mas was partially circumferential (1/3 of lumen circumference) and measured 6cm in length and 5mm in diameter. Also had ascending colon polyp which was benign.   \par Path - very minute fragment of colon mucosa with at least intramucosal adenocarcinoma with focus suspicious for LVI.   More advanced lesion could not be excluded.  \par EGD same day unremarkable. \par 1/14/21 - CT CAP - no evidence of metastatic disease.\par 1/27/21 - Right hemicolectomy - poorly diff adenocarcinoma 2.5 cm infiltrates into pericolonic tissue LVI +  0/14 LN .\par MMR intact.   pT3pN0 = Stage IIA\par Plan is for adjuvant chemotherapy with capecitabine x 6 months.  \par Had IV iron x 2 for iron deficiency anemia.\par 3/4/21 - Started Xeloda.  [de-identified] : poorly diff adenocarcinoma  [de-identified] : Patient looks very red - reports using  and applying often.  States he often has a flushed appearance after being outside.  \par He feels he is tolerating Xeloda well - has no complaints.\par Hands do have a bunch of fissures on his fingers - patient not complaining about this -states he was feeling so good he wanted to work on his deck and has been doing that project.  Moisturizing regularly.  States feet are fine. \par Denies any other changes in his family, medical, or social history since his last visit of 5/3/21.

## 2021-06-24 NOTE — ASSESSMENT
[FreeTextEntry1] : Patient is a 56 y.o. with a stage IIA colon cancer. MMRP intact, proximal transverse colon, s/p right hemicolectomy for pT3,pN0 disease. \par 3/4/21 - Started on Adjuvant Xeloda.  \par Today D8 C6 out of 8. \par \par 1. Onc - Tolerating Xeloda very well.  Has mild HFS but also building a deck!!  Does not wish to stop.  Not bothersome to him.  No bowel issues.   Continue for 8 cycles.  \par Last dose calculated to be completed on 8/12/21. \par 2. Elevated CEA - Patient with CEA x 2 >7.  Per. Dr. Sumner's note were to get CT scans in July.  \par Will repeat CEA level today 1st - if same or higher will get now; if lower will wait until after adjuvant chemo completed to have done (Early Sept). \par Patient will have routine labs done by his PCP next week - will fax a copy to our office. \par PE ~ mid Sept (1 month after Xeloda completed) \par All questions answered.

## 2021-06-24 NOTE — PHYSICAL EXAM
[Normal] : affect appropriate [de-identified] : overweight middle aged male [de-identified] : anicteric [de-identified] : no c/c/e [de-identified] : no rashes.  Legs with multiple cuts and scrapes.  "Xeloda hands" - shiny, fissures in fingertips, slightly dry creases that look  like they were recently moisturized.

## 2021-06-28 DIAGNOSIS — R97.0 ELEVATED CARCINOEMBRYONIC ANTIGEN [CEA]: ICD-10-CM

## 2021-06-28 DIAGNOSIS — C18.4 MALIGNANT NEOPLASM OF TRANSVERSE COLON: ICD-10-CM

## 2021-06-28 DIAGNOSIS — Z79.899 OTHER LONG TERM (CURRENT) DRUG THERAPY: ICD-10-CM

## 2021-07-20 ENCOUNTER — RX RENEWAL (OUTPATIENT)
Age: 56
End: 2021-07-20

## 2021-08-16 ENCOUNTER — RESULT REVIEW (OUTPATIENT)
Age: 56
End: 2021-08-16

## 2021-09-01 ENCOUNTER — APPOINTMENT (OUTPATIENT)
Dept: CT IMAGING | Facility: CLINIC | Age: 56
End: 2021-09-01
Payer: COMMERCIAL

## 2021-09-01 ENCOUNTER — OUTPATIENT (OUTPATIENT)
Dept: OUTPATIENT SERVICES | Facility: HOSPITAL | Age: 56
LOS: 1 days | End: 2021-09-01
Payer: COMMERCIAL

## 2021-09-01 DIAGNOSIS — Z98.890 OTHER SPECIFIED POSTPROCEDURAL STATES: Chronic | ICD-10-CM

## 2021-09-01 DIAGNOSIS — Z00.8 ENCOUNTER FOR OTHER GENERAL EXAMINATION: ICD-10-CM

## 2021-09-01 PROCEDURE — 74177 CT ABD & PELVIS W/CONTRAST: CPT

## 2021-09-01 PROCEDURE — 82565 ASSAY OF CREATININE: CPT

## 2021-09-01 PROCEDURE — 74177 CT ABD & PELVIS W/CONTRAST: CPT | Mod: 26

## 2021-09-01 PROCEDURE — 71260 CT THORAX DX C+: CPT | Mod: 26

## 2021-09-01 PROCEDURE — 71260 CT THORAX DX C+: CPT

## 2021-09-16 ENCOUNTER — OUTPATIENT (OUTPATIENT)
Dept: OUTPATIENT SERVICES | Facility: HOSPITAL | Age: 56
LOS: 1 days | Discharge: HOME | End: 2021-09-16

## 2021-09-16 DIAGNOSIS — C18.9 MALIGNANT NEOPLASM OF COLON, UNSPECIFIED: ICD-10-CM

## 2021-09-16 DIAGNOSIS — Z98.890 OTHER SPECIFIED POSTPROCEDURAL STATES: Chronic | ICD-10-CM

## 2021-09-17 ENCOUNTER — RESULT REVIEW (OUTPATIENT)
Age: 56
End: 2021-09-17

## 2021-09-17 ENCOUNTER — APPOINTMENT (OUTPATIENT)
Dept: HEMATOLOGY ONCOLOGY | Facility: CLINIC | Age: 56
End: 2021-09-17
Payer: COMMERCIAL

## 2021-09-17 VITALS
SYSTOLIC BLOOD PRESSURE: 127 MMHG | DIASTOLIC BLOOD PRESSURE: 77 MMHG | TEMPERATURE: 98.4 F | WEIGHT: 236 LBS | HEART RATE: 103 BPM | BODY MASS INDEX: 35.88 KG/M2

## 2021-09-17 LAB
BASOPHILS # BLD AUTO: 0.03 K/UL — SIGNIFICANT CHANGE UP (ref 0–0.2)
BASOPHILS NFR BLD AUTO: 0.5 % — SIGNIFICANT CHANGE UP (ref 0–2)
EOSINOPHIL # BLD AUTO: 0.18 K/UL — SIGNIFICANT CHANGE UP (ref 0–0.5)
EOSINOPHIL NFR BLD AUTO: 2.9 % — SIGNIFICANT CHANGE UP (ref 0–6)
HCT VFR BLD CALC: 46 % — SIGNIFICANT CHANGE UP (ref 39–50)
HGB BLD-MCNC: 16.5 G/DL — SIGNIFICANT CHANGE UP (ref 13–17)
IMM GRANULOCYTES NFR BLD AUTO: 0.2 % — SIGNIFICANT CHANGE UP (ref 0–1.5)
LYMPHOCYTES # BLD AUTO: 1.67 K/UL — SIGNIFICANT CHANGE UP (ref 1–3.3)
LYMPHOCYTES # BLD AUTO: 27.3 % — SIGNIFICANT CHANGE UP (ref 13–44)
MCHC RBC-ENTMCNC: 34 PG — SIGNIFICANT CHANGE UP (ref 27–34)
MCHC RBC-ENTMCNC: 35.9 GM/DL — SIGNIFICANT CHANGE UP (ref 32–36)
MCV RBC AUTO: 94.8 FL — SIGNIFICANT CHANGE UP (ref 80–100)
MONOCYTES # BLD AUTO: 0.54 K/UL — SIGNIFICANT CHANGE UP (ref 0–0.9)
MONOCYTES NFR BLD AUTO: 8.8 % — SIGNIFICANT CHANGE UP (ref 2–14)
NEUTROPHILS # BLD AUTO: 3.68 K/UL — SIGNIFICANT CHANGE UP (ref 1.8–7.4)
NEUTROPHILS NFR BLD AUTO: 60.3 % — SIGNIFICANT CHANGE UP (ref 43–77)
NRBC # BLD: 0 /100 WBCS — SIGNIFICANT CHANGE UP (ref 0–0)
PLATELET # BLD AUTO: 195 K/UL — SIGNIFICANT CHANGE UP (ref 150–400)
RBC # BLD: 4.85 M/UL — SIGNIFICANT CHANGE UP (ref 4.2–5.8)
RBC # FLD: 13.1 % — SIGNIFICANT CHANGE UP (ref 10.3–14.5)
WBC # BLD: 6.11 K/UL — SIGNIFICANT CHANGE UP (ref 3.8–10.5)
WBC # FLD AUTO: 6.11 K/UL — SIGNIFICANT CHANGE UP (ref 3.8–10.5)

## 2021-09-17 PROCEDURE — 99214 OFFICE O/P EST MOD 30 MIN: CPT

## 2021-09-18 LAB
ALBUMIN SERPL ELPH-MCNC: 4.4 G/DL — SIGNIFICANT CHANGE UP (ref 3.3–5)
ALP SERPL-CCNC: 60 U/L — SIGNIFICANT CHANGE UP (ref 40–120)
ALT FLD-CCNC: 29 U/L — SIGNIFICANT CHANGE UP (ref 10–45)
ANION GAP SERPL CALC-SCNC: 12 MMOL/L — SIGNIFICANT CHANGE UP (ref 5–17)
AST SERPL-CCNC: 29 U/L — SIGNIFICANT CHANGE UP (ref 10–40)
BILIRUB SERPL-MCNC: 0.6 MG/DL — SIGNIFICANT CHANGE UP (ref 0.2–1.2)
BUN SERPL-MCNC: 15 MG/DL — SIGNIFICANT CHANGE UP (ref 7–23)
CALCIUM SERPL-MCNC: 9.3 MG/DL — SIGNIFICANT CHANGE UP (ref 8.4–10.5)
CEA SERPL-MCNC: 6.7 NG/ML — HIGH (ref 0–3.8)
CHLORIDE SERPL-SCNC: 104 MMOL/L — SIGNIFICANT CHANGE UP (ref 96–108)
CO2 SERPL-SCNC: 22 MMOL/L — SIGNIFICANT CHANGE UP (ref 22–31)
CREAT SERPL-MCNC: 0.84 MG/DL — SIGNIFICANT CHANGE UP (ref 0.5–1.3)
FERRITIN SERPL-MCNC: 77 NG/ML — SIGNIFICANT CHANGE UP (ref 30–400)
GLUCOSE SERPL-MCNC: 243 MG/DL — HIGH (ref 70–99)
IRON SATN MFR SERPL: 16 % — SIGNIFICANT CHANGE UP (ref 16–55)
IRON SATN MFR SERPL: 58 UG/DL — SIGNIFICANT CHANGE UP (ref 45–165)
POTASSIUM SERPL-MCNC: 4.6 MMOL/L — SIGNIFICANT CHANGE UP (ref 3.5–5.3)
POTASSIUM SERPL-SCNC: 4.6 MMOL/L — SIGNIFICANT CHANGE UP (ref 3.5–5.3)
PROT SERPL-MCNC: 6.8 G/DL — SIGNIFICANT CHANGE UP (ref 6–8.3)
SODIUM SERPL-SCNC: 139 MMOL/L — SIGNIFICANT CHANGE UP (ref 135–145)
TIBC SERPL-MCNC: 359 UG/DL — SIGNIFICANT CHANGE UP (ref 220–430)
UIBC SERPL-MCNC: 302 UG/DL — SIGNIFICANT CHANGE UP (ref 110–370)

## 2021-09-21 DIAGNOSIS — C18.4 MALIGNANT NEOPLASM OF TRANSVERSE COLON: ICD-10-CM

## 2021-09-21 DIAGNOSIS — Z08 ENCOUNTER FOR FOLLOW-UP EXAMINATION AFTER COMPLETED TREATMENT FOR MALIGNANT NEOPLASM: ICD-10-CM

## 2021-10-19 NOTE — PROGRESS NOTE ADULT - ATTENDING COMMENTS
Pt seen ambulating.  reports good pain control.  no nausea.   Moderate flatus, no Bm yet    Vital Signs Last 24 Hrs  T(C): 36.8 (28 Jan 2021 09:48), Max: 37.3 (27 Jan 2021 16:10)  T(F): 98.2 (28 Jan 2021 09:48), Max: 99.1 (27 Jan 2021 16:10)  HR: 107 (28 Jan 2021 09:48) (75 - 107)  BP: 136/67 (28 Jan 2021 09:48) (100/56 - 156/70)  BP(mean): --  RR: 18 (28 Jan 2021 09:48) (12 - 23)  SpO2: 98% (28 Jan 2021 09:48) (95% - 100%)    Uo 925/8h    Abd ND/soft/mild incisional tenderness, no rebound or guarding  proveena intact    01-28    139  |  107  |  11  ----------------------------<  123<H>  4.2   |  27  |  1.26    Ca    9.6      28 Jan 2021 08:17  Phos  3.8     01-28  Mg     2.2     01-28                          10.8   12.91 )-----------( 237      ( 28 Jan 2021 08:17 )             36.1     imp: POD#1 s/p lap R hemicolectomy  --doing well.  continue ambulation.  Already voided after dunn out  --ADAT
Pt seen and examined.    with bowel function.  pain under control    abd ND/soft    imp: s/p lap R hemicolectomy POD2  --doing well  --home today.
right foot pain

## 2021-12-06 ENCOUNTER — NON-APPOINTMENT (OUTPATIENT)
Age: 56
End: 2021-12-06

## 2022-01-01 NOTE — ASU PREOP CHECKLIST - ANTIBIOTIC
Speech Therapy    Visit Type: Treatment  Born at Gestational Age: 37w2d and now corrected gestational age 41w 6d    Nursing comments: RN reported infant spit up after 0800 feeding.   Present at bedside: SLP and patient    SUBJECTIVE  Infant awake and alert. Vitals wnl. No parent at bedside.   Face, Legs, Activity, Cry, Consolability Scale (FLACC)     Face: 0 - No particular expression or smile    Legs: 0 - Normal position or relaxed    Activity: 0 - Lying quietly, normal position, moves easily    Cry: 0 - No cry (awake or asleep)    Consolability: 0 - Content, relaxed    Score: 0    OBJECTIVE    Diet:  Oral: thin liquids   - Mode: Dr. Brown's Preemie Nipple  Schedule: formula   - 75 ml every 3, calories: 24  Environment and Equipment:   - Bed type: open crib   - Lights: low   - Sound: decrease auditory stimuli  Room air      Infant Feeding   - Neurobehavioral Stress onset of session       • Physiologic stability: stable heart rate, oxygen saturations within parameters and respiratory rate <60       • State stability: quiet alert       • Motor instability: finger splay and facial grimace   - Nutrition: Similac;  24 Edvin   - Interventions prior to feeding: flexed midline positioning, swaddle and non nutritive suck  Non-Nutritive Suck   - Interest in rooting: immediate and with mild stimulation   - Root to latch sequencing: strong, disorganized and with mild stimulation   - Drive to suck: strong   - Tongue movement: decreased seal, decreased tongue cup and atypical   - Suck strength: adequate   - Suck endurance: intermittent suck bursts   - Sucking pattern: disorganized and arrhythmic  Feeding Readiness   - Stable on appropriate flow: yes   - Neurodevelopmental maturation including alertness for feedings: yes   - Demonstrates hunger cues: yes   - Able to display NNS intermittently for 5-10 minutes: yes   - Infant appropriate for: PO feedings and non nutritive suck trials  Oral Feeding Session   - Feeder: SLP   - Infant  consumed       • 17 mls by mouth   - Mode: Dr. Brown's bottle system and preemie flow   - Interest in rooting: strong and with mild stimulation   - Root to latch sequencing: disorganized and with mild stimulation   - Drive/interest in PO: strong   - Suck initiation: disorganized and wide oral excursion   - Suck strength: impaired   - Suck endurance: intermittent suck bursts   - Oral phase: inconsistent jaw excursions, uncoordinated suck-swallow-breathe pattern, flaccid tongue and wide jaw excursion   - Feeding pattern: disorganized, arrhythmical and dysfunctional   - Neurobehavioral status during session       • Autonomic stability: stable heart rate and oxygen saturations within parameters       • State stability: active alert       • State instability: low level of alertness       • Motor stability: hands to mouth and sucking       • Motor instability: diffuse activity  Feeding Interventions   - Interventions: follow infant driven cues throughout feeding, external pacing, elevated sidelying positioning and frequent breaks to re-organize  Feeding stopped after gagging followed by fatigue   - Paced: pace per infant cues   - Neurobehavioral status after session       • Autonomic stability: regular respiration, stable heart rate and oxygen saturations within parameters       • State instability: loss of alertness       • Motor instability: flaccidity           Session Summary  - Session focused on: feeding and providing positive feeding experience    Education  - Parent(s) not present: SLP shared impressions with RN and SLP will meet with parent(s) as available       ASSESSMENT                                                                        • Impairments: swallowing  • Functional Limitations: eating/drinking  • Skilled therapy is required to establish safe means of nutrition, hydration and medication administration  • Infant presents with moderately disorganized oral feeding pattern with improved ability to  initiate a pattern, inconsistent jaw excursions, and uncoordinated suck/swallow/breathe pattern; infant with dysfunctional suck pattern with flaccid tongue and excessive jaw excursions. Infant benefited from pacing based on cues. Infant maintained vitals for the duration of this feeding. Infant noted to gag after 17mLs with retching. Feeding stopped due to gagging and retching followed by fatigue. Infant appears safe to po feed if showing po feeding readiness cues and pace based on cues.     Requires SLP Follow Up: Yes    Discharge Recommendations  SLP Referrals/Discharge Recommendations: Refer to NICU follow up clinic, Refer to early intervention, Refer to outpatient    • Rehab Potential: fair  • Potential barriers to progress:  Current medical conditions  • Progress: Slow progress, decreased activity tolerance    Patient at end of session:    - location: open crib    - safety measures: bed rails x2    - hand off to: nurse    PLAN     Frequency: 2-3x/week    Interventions:  Dysphagia therapy    RECOMMENDATIONS  Diet: PO/NG   - Attempt oral feedings when awake, alert and demonstrating readiness; stop feeding with increased RR, poor tolerance or safety concerns.  Aspiration risk: moderate  Factors impacting feeding: comorbidities  Infant Feeding Plan:    - reinforce positive feeding practice, establish NNS prior to feeding, PO only when infant is showing sign of engagement and interest and follow infant cues   - Mode: Dr. Brown's bottle system and preemie flow   - Positioning: elevated sidelying   - Pacing: pace per infant cues    GOALS    Long Term Goals:   Infant will tolerate neurodevelopmental techniques, without stress cues, to improve overall organization and ability to initiate and sustain a suck pattern    Infant will tolerate oral-facial stimulation and organizing techniques to improve tongue movement, tongue cup and strength of seal to improve overall efficiency of non-nutritive and nutritive suck    Infant  will demonstrate a safe, organized oral feeding pattern to support nutritional needs      Documented in the chart in the following areas: Assessment.      Therapy procedure time and total treatment time can be found documented on the Time Entry flowsheet.   n/a

## 2022-01-05 ENCOUNTER — APPOINTMENT (OUTPATIENT)
Dept: COLORECTAL SURGERY | Facility: CLINIC | Age: 57
End: 2022-01-05
Payer: COMMERCIAL

## 2022-01-05 VITALS
HEIGHT: 68 IN | HEART RATE: 90 BPM | WEIGHT: 237 LBS | DIASTOLIC BLOOD PRESSURE: 91 MMHG | SYSTOLIC BLOOD PRESSURE: 162 MMHG | BODY MASS INDEX: 35.92 KG/M2 | TEMPERATURE: 97.8 F

## 2022-01-05 PROCEDURE — 99214 OFFICE O/P EST MOD 30 MIN: CPT

## 2022-01-06 NOTE — PHYSICAL EXAM
[Fully active, able to carry on all pre-disease performance without restriction] : Status 0 - Fully active, able to carry on all pre-disease performance without restriction [Normal] : well developed, well nourished, in no acute distress [Abdomen Masses] : No abdominal masses [Abdomen Tenderness] : ~T No ~M abdominal tenderness [Tender] : nontender [JVD] : no jugular venous distention  [Normal Breath Sounds] : Normal breath sounds [Normal Heart Sounds] : normal heart sounds [Normal Rate and Rhythm] : normal rate and rhythm [Alert] : alert [Oriented to Person] : oriented to person [Oriented to Place] : oriented to place [Oriented to Time] : oriented to time [Calm] : calm [de-identified] : looks well nad [de-identified] : alex headley [de-identified] : moves all 4

## 2022-01-06 NOTE — PHYSICAL EXAM
[Abdomen Masses] : No abdominal masses [Abdomen Tenderness] : ~T No ~M abdominal tenderness [Tender] : nontender [Normal Breath Sounds] : Normal breath sounds [JVD] : no jugular venous distention  [Normal Heart Sounds] : normal heart sounds [Normal Rate and Rhythm] : normal rate and rhythm [No Rash or Lesion] : No rash or lesion [Alert] : alert [Oriented to Person] : oriented to person [Oriented to Place] : oriented to place [Oriented to Time] : oriented to time [Calm] : calm [de-identified] : Obese, well healed insions [de-identified] : Looks well in no distress, of stated age. [de-identified] : moves all 4 extremities appropriately with 5 over 5 strength [de-identified] : pupils equal reactive to light normocephalic atraumatic.

## 2022-01-06 NOTE — PHYSICAL EXAM
[Fully active, able to carry on all pre-disease performance without restriction] : Status 0 - Fully active, able to carry on all pre-disease performance without restriction [Normal] : well developed, well nourished, in no acute distress [Abdomen Masses] : No abdominal masses [Abdomen Tenderness] : ~T No ~M abdominal tenderness [Tender] : nontender [JVD] : no jugular venous distention  [Normal Breath Sounds] : Normal breath sounds [Normal Heart Sounds] : normal heart sounds [Normal Rate and Rhythm] : normal rate and rhythm [Alert] : alert [Oriented to Place] : oriented to place [Oriented to Person] : oriented to person [Oriented to Time] : oriented to time [Calm] : calm [de-identified] : looks well nad [de-identified] : alex headley [de-identified] : moves all 4

## 2022-01-06 NOTE — ASSESSMENT
[FreeTextEntry1] : 56-year-old male with cancer of the proximal transverse colon post resection for T3N0 stage 2A. elevated CEA.

## 2022-01-06 NOTE — ASSESSMENT
[FreeTextEntry1] : 56 year old male with stage 2A colon cancer. with elevated CEA. recommend colonoscopy. risk and benefits explained including bleeding, perforation, missing a cancer or polyp in 5%.

## 2022-01-06 NOTE — HISTORY OF PRESENT ILLNESS
[Disease: _____________________] : Disease: [unfilled] [T: ___] : T[unfilled] [N: ___] : N[unfilled] [AJCC Stage: ____] : AJCC Stage: [unfilled] [de-identified] : poorly differentiated adenocarcinoma G 3  [de-identified] : 56 y/o male  found to have mild iron deficiency anemia of routine blood work. Colonoscopy ( Dr Rosas) revealed a large  tumor in the proximal transverse colon. Patient had no GI symptoms.\par CT CAP  1/14/21 : no evidence  of metastatic disease.\par \par 1/27/21 R hemicolectomy : poorly diff adenocarcinoma 2.5 cm infiltrates into pericolonic tissue LVI +  0/14 LN .\par \par Adjuvant capecitabine x 6 months  3/4/21-end of August 2021 \par \par s/p iv iron for BELINDA \par \par persistent chronic elevation in CEA ~ 7\par \par CT CAP 9/1/21 : no metastatic disease  hepatic steatosis, unchanged tiny pulmonary nodules  RML and RLL \par \par  [de-identified] : MMRP  intact   MSI  stable  [de-identified] : Feels well. Had mild ahnd foot sx from capecitabine- now almost completely resolved. Energy is good. no GI c/o  [FreeTextEntry1] : 56 year old male post laparoscopic extended righ hemicolectomy for T3N0 stage 2A colon cancer. treated with 6 months of chemo. here for follow up. doing well, no complaints except elevated CEA

## 2022-01-06 NOTE — HISTORY OF PRESENT ILLNESS
[Disease: _____________________] : Disease: [unfilled] [T: ___] : T[unfilled] [N: ___] : N[unfilled] [AJCC Stage: ____] : AJCC Stage: [unfilled] [de-identified] : 54 y/o male  found to have mild iron deficiency anemia of routine blood work. Colonoscopy ( Dr Rosas) revealed a large  tumor in the proximal transverse colon. Patient had no GI symptoms.\par CT CAP  1/14/21 : no evidence  of metastatic disease.\par \par 1/27/21 R hemicolectomy : poorly diff adenocarcinoma 2.5 cm infiltrates into pericolonic tissue LVI +  0/14 LN .\par \par Adjuvant capecitabine x 6 months  3/4/21-end of August 2021 \par \par s/p iv iron for BELINDA \par \par persistent chronic elevation in CEA ~ 7\par \par CT CAP 9/1/21 : no metastatic disease  hepatic steatosis, unchanged tiny pulmonary nodules  RML and RLL \par \par  [de-identified] : poorly differentiated adenocarcinoma G 3  [de-identified] : MMRP  intact   MSI  stable  [de-identified] : Feels well. Had mild ahnd foot sx from capecitabine- now almost completely resolved. Energy is good. no GI c/o  [FreeTextEntry1] : 56 year old male post laparoscopic extended righ hemicolectomy for T3N0 stage 2A colon cancer. treated with 6 months of chemo. here for follow up. doing well, no complaints except elevated CEA

## 2022-01-06 NOTE — REASON FOR VISIT
[Follow-Up Visit] : a follow-up [FreeTextEntry2] : stage II colon cancer s/p  adjuvant   capecitabine [Follow-Up: _____] : a [unfilled] follow-up visit

## 2022-01-06 NOTE — HISTORY OF PRESENT ILLNESS
[FreeTextEntry1] :  55-year-old male found to be anemic on blood work colonoscopy  large ulcerated tumor of the proximal transverse colon suspicious for cancer. underwent extended right hemicolectomy for T3N0 stage 2A colon cancer, post chemotherapy for 6 months. has elevated CEA

## 2022-01-14 ENCOUNTER — APPOINTMENT (OUTPATIENT)
Dept: COLORECTAL SURGERY | Facility: AMBULATORY MEDICAL SERVICES | Age: 57
End: 2022-01-14
Payer: COMMERCIAL

## 2022-01-14 PROCEDURE — 45330 DIAGNOSTIC SIGMOIDOSCOPY: CPT

## 2022-01-20 ENCOUNTER — APPOINTMENT (OUTPATIENT)
Dept: NUCLEAR MEDICINE | Facility: CLINIC | Age: 57
End: 2022-01-20

## 2022-01-20 ENCOUNTER — OUTPATIENT (OUTPATIENT)
Dept: OUTPATIENT SERVICES | Facility: HOSPITAL | Age: 57
LOS: 1 days | Discharge: HOME | End: 2022-01-20

## 2022-01-20 DIAGNOSIS — Z98.890 OTHER SPECIFIED POSTPROCEDURAL STATES: Chronic | ICD-10-CM

## 2022-01-20 DIAGNOSIS — C18.9 MALIGNANT NEOPLASM OF COLON, UNSPECIFIED: ICD-10-CM

## 2022-01-21 ENCOUNTER — RESULT REVIEW (OUTPATIENT)
Age: 57
End: 2022-01-21

## 2022-01-21 ENCOUNTER — APPOINTMENT (OUTPATIENT)
Dept: HEMATOLOGY ONCOLOGY | Facility: CLINIC | Age: 57
End: 2022-01-21
Payer: COMMERCIAL

## 2022-01-21 VITALS
WEIGHT: 240 LBS | TEMPERATURE: 98.1 F | HEART RATE: 81 BPM | HEIGHT: 68 IN | DIASTOLIC BLOOD PRESSURE: 87 MMHG | BODY MASS INDEX: 36.37 KG/M2 | OXYGEN SATURATION: 98 % | RESPIRATION RATE: 16 BRPM | SYSTOLIC BLOOD PRESSURE: 126 MMHG

## 2022-01-21 LAB
ALBUMIN SERPL ELPH-MCNC: 4.5 G/DL — SIGNIFICANT CHANGE UP (ref 3.3–5)
ALP SERPL-CCNC: 59 U/L — SIGNIFICANT CHANGE UP (ref 40–120)
ALT FLD-CCNC: 33 U/L — SIGNIFICANT CHANGE UP (ref 10–45)
ANION GAP SERPL CALC-SCNC: 12 MMOL/L — SIGNIFICANT CHANGE UP (ref 5–17)
AST SERPL-CCNC: 21 U/L — SIGNIFICANT CHANGE UP (ref 10–40)
BASOPHILS # BLD AUTO: 0.04 K/UL — SIGNIFICANT CHANGE UP (ref 0–0.2)
BASOPHILS NFR BLD AUTO: 0.8 % — SIGNIFICANT CHANGE UP (ref 0–2)
BILIRUB SERPL-MCNC: 0.7 MG/DL — SIGNIFICANT CHANGE UP (ref 0.2–1.2)
BUN SERPL-MCNC: 10 MG/DL — SIGNIFICANT CHANGE UP (ref 7–23)
CALCIUM SERPL-MCNC: 9.6 MG/DL — SIGNIFICANT CHANGE UP (ref 8.4–10.5)
CEA SERPL-MCNC: 8.9 NG/ML — HIGH (ref 0–3.8)
CHLORIDE SERPL-SCNC: 103 MMOL/L — SIGNIFICANT CHANGE UP (ref 96–108)
CO2 SERPL-SCNC: 23 MMOL/L — SIGNIFICANT CHANGE UP (ref 22–31)
CREAT SERPL-MCNC: 0.77 MG/DL — SIGNIFICANT CHANGE UP (ref 0.5–1.3)
EOSINOPHIL # BLD AUTO: 0.11 K/UL — SIGNIFICANT CHANGE UP (ref 0–0.5)
EOSINOPHIL NFR BLD AUTO: 2.1 % — SIGNIFICANT CHANGE UP (ref 0–6)
GLUCOSE SERPL-MCNC: 222 MG/DL — HIGH (ref 70–99)
HCT VFR BLD CALC: 47.6 % — SIGNIFICANT CHANGE UP (ref 39–50)
HGB BLD-MCNC: 17.1 G/DL — HIGH (ref 13–17)
IMM GRANULOCYTES NFR BLD AUTO: 0.2 % — SIGNIFICANT CHANGE UP (ref 0–1.5)
LYMPHOCYTES # BLD AUTO: 1.67 K/UL — SIGNIFICANT CHANGE UP (ref 1–3.3)
LYMPHOCYTES # BLD AUTO: 31.6 % — SIGNIFICANT CHANGE UP (ref 13–44)
MCHC RBC-ENTMCNC: 31.3 PG — SIGNIFICANT CHANGE UP (ref 27–34)
MCHC RBC-ENTMCNC: 35.9 GM/DL — SIGNIFICANT CHANGE UP (ref 32–36)
MCV RBC AUTO: 87 FL — SIGNIFICANT CHANGE UP (ref 80–100)
MONOCYTES # BLD AUTO: 0.47 K/UL — SIGNIFICANT CHANGE UP (ref 0–0.9)
MONOCYTES NFR BLD AUTO: 8.9 % — SIGNIFICANT CHANGE UP (ref 2–14)
NEUTROPHILS # BLD AUTO: 2.98 K/UL — SIGNIFICANT CHANGE UP (ref 1.8–7.4)
NEUTROPHILS NFR BLD AUTO: 56.4 % — SIGNIFICANT CHANGE UP (ref 43–77)
NRBC # BLD: 0 /100 WBCS — SIGNIFICANT CHANGE UP (ref 0–0)
PLATELET # BLD AUTO: 166 K/UL — SIGNIFICANT CHANGE UP (ref 150–400)
POTASSIUM SERPL-MCNC: 4.3 MMOL/L — SIGNIFICANT CHANGE UP (ref 3.5–5.3)
POTASSIUM SERPL-SCNC: 4.3 MMOL/L — SIGNIFICANT CHANGE UP (ref 3.5–5.3)
PROT SERPL-MCNC: 7 G/DL — SIGNIFICANT CHANGE UP (ref 6–8.3)
RBC # BLD: 5.47 M/UL — SIGNIFICANT CHANGE UP (ref 4.2–5.8)
RBC # FLD: 12.2 % — SIGNIFICANT CHANGE UP (ref 10.3–14.5)
SODIUM SERPL-SCNC: 138 MMOL/L — SIGNIFICANT CHANGE UP (ref 135–145)
WBC # BLD: 5.28 K/UL — SIGNIFICANT CHANGE UP (ref 3.8–10.5)
WBC # FLD AUTO: 5.28 K/UL — SIGNIFICANT CHANGE UP (ref 3.8–10.5)

## 2022-01-21 PROCEDURE — 99214 OFFICE O/P EST MOD 30 MIN: CPT

## 2022-01-24 NOTE — HISTORY OF PRESENT ILLNESS
[Disease: _____________________] : Disease: [unfilled] [T: ___] : T[unfilled] [N: ___] : N[unfilled] [AJCC Stage: ____] : AJCC Stage: [unfilled] [de-identified] : 54 y/o male  found to have mild iron deficiency anemia of routine blood work. Colonoscopy ( Dr Rosas) revealed a large  tumor in the proximal transverse colon. Patient had no GI symptoms.\par CT CAP  1/14/21 : no evidence  of metastatic disease.\par \par 1/27/21 R hemicolectomy : poorly diff adenocarcinoma 2.5 cm infiltrates into pericolonic tissue LVI +  0/14 LN .\par \par Adjuvant capecitabine x 6 months  3/4/21-end of August 2021 \par \par s/p iv iron for BELINDA \par \par persistent chronic elevation in CEA ~ 7\par \par CT CAP 9/1/21 : no metastatic disease  hepatic steatosis, unchanged tiny pulmonary nodules  RML and RLL \par \par  [de-identified] : poorly differentiated adenocarcinoma G 3  [de-identified] : MMRP  intact   MSI  stable  [de-identified] : recent colonoscopy   Jan 2022 - OK, told : next one in one year \par had no complaints. \par has questions re ctDNA monitoring

## 2022-01-24 NOTE — ASSESSMENT
[FreeTextEntry1] : 57 y/o male with stage II B  T3, N0 cancer of proximal transverse colon s/p  R hemicolectomy 1/27/21 .\par High risk features : poorly differentiated histology with LVI. Tumor is MSI- stable. \par \par S/p adjuvant capecitabine  x 6 months - completed August 2021.\par \par Most recent  CT CAP   Sept 2021 LAURI . Tiny pulmonary nodules- stable . \par Chronic elevated CEA ~ 7 . \par \par Close monitoring.\par Exam/ labs/ CEA every 4 months.\par Scans CT CAP with contrast in 6 months/ will be due in Feb/ March but will need scans sooner if raising CEA ( pending today).\par \par Long discussion re limited value of ctDNA testing- test approved by FDA but not used to  make treatment decisions. Ongoing clinical trial with ctDNA monitoring versus standard CEA and scans - in high risk stage II disease\par Colonoscopy ( will be due ~ Jan 2023 ). \par \par Return visit in 4 months / sooner  as needed . \par \par

## 2022-01-24 NOTE — REVIEW OF SYSTEMS
[Patient Intake Form Reviewed] : Patient intake form was reviewed [Joint Pain] : joint pain [Joint Stiffness] : joint stiffness [Negative] : Allergic/Immunologic

## 2022-01-24 NOTE — REASON FOR VISIT
[Follow-Up Visit] : a follow-up [FreeTextEntry2] : stage II colon cancer s/p  adjuvant   capecitabine

## 2022-01-25 DIAGNOSIS — Z08 ENCOUNTER FOR FOLLOW-UP EXAMINATION AFTER COMPLETED TREATMENT FOR MALIGNANT NEOPLASM: ICD-10-CM

## 2022-01-25 DIAGNOSIS — C18.4 MALIGNANT NEOPLASM OF TRANSVERSE COLON: ICD-10-CM

## 2022-01-25 DIAGNOSIS — R97.0 ELEVATED CARCINOEMBRYONIC ANTIGEN [CEA]: ICD-10-CM

## 2022-01-26 ENCOUNTER — NON-APPOINTMENT (OUTPATIENT)
Age: 57
End: 2022-01-26

## 2022-02-02 ENCOUNTER — OUTPATIENT (OUTPATIENT)
Dept: OUTPATIENT SERVICES | Facility: HOSPITAL | Age: 57
LOS: 1 days | End: 2022-02-02
Payer: COMMERCIAL

## 2022-02-02 ENCOUNTER — APPOINTMENT (OUTPATIENT)
Dept: CT IMAGING | Facility: CLINIC | Age: 57
End: 2022-02-02
Payer: COMMERCIAL

## 2022-02-02 ENCOUNTER — RESULT REVIEW (OUTPATIENT)
Age: 57
End: 2022-02-02

## 2022-02-02 DIAGNOSIS — C18.4 MALIGNANT NEOPLASM OF TRANSVERSE COLON: ICD-10-CM

## 2022-02-02 DIAGNOSIS — Z98.890 OTHER SPECIFIED POSTPROCEDURAL STATES: Chronic | ICD-10-CM

## 2022-02-02 PROCEDURE — 71260 CT THORAX DX C+: CPT

## 2022-02-02 PROCEDURE — 74177 CT ABD & PELVIS W/CONTRAST: CPT

## 2022-02-02 PROCEDURE — 74177 CT ABD & PELVIS W/CONTRAST: CPT | Mod: 26

## 2022-02-02 PROCEDURE — 71260 CT THORAX DX C+: CPT | Mod: 26

## 2022-02-02 PROCEDURE — 82565 ASSAY OF CREATININE: CPT

## 2022-02-03 ENCOUNTER — NON-APPOINTMENT (OUTPATIENT)
Age: 57
End: 2022-02-03

## 2022-02-03 DIAGNOSIS — K80.20 CALCULUS OF GALLBLADDER W/OUT CHOLECYSTITIS W/OUT OBSTRUCTION: ICD-10-CM

## 2022-03-17 ENCOUNTER — OUTPATIENT (OUTPATIENT)
Dept: OUTPATIENT SERVICES | Facility: HOSPITAL | Age: 57
LOS: 1 days | End: 2022-03-17
Payer: COMMERCIAL

## 2022-03-17 VITALS
SYSTOLIC BLOOD PRESSURE: 144 MMHG | RESPIRATION RATE: 16 BRPM | WEIGHT: 240.08 LBS | OXYGEN SATURATION: 98 % | HEIGHT: 67 IN | TEMPERATURE: 98 F | HEART RATE: 77 BPM | DIASTOLIC BLOOD PRESSURE: 87 MMHG

## 2022-03-17 DIAGNOSIS — Z90.49 ACQUIRED ABSENCE OF OTHER SPECIFIED PARTS OF DIGESTIVE TRACT: Chronic | ICD-10-CM

## 2022-03-17 DIAGNOSIS — K80.20 CALCULUS OF GALLBLADDER WITHOUT CHOLECYSTITIS WITHOUT OBSTRUCTION: ICD-10-CM

## 2022-03-17 DIAGNOSIS — Z01.818 ENCOUNTER FOR OTHER PREPROCEDURAL EXAMINATION: ICD-10-CM

## 2022-03-17 DIAGNOSIS — Z98.890 OTHER SPECIFIED POSTPROCEDURAL STATES: Chronic | ICD-10-CM

## 2022-03-17 LAB
ALBUMIN SERPL ELPH-MCNC: 4 G/DL — SIGNIFICANT CHANGE UP (ref 3.3–5)
ANION GAP SERPL CALC-SCNC: 4 MMOL/L — LOW (ref 5–17)
APPEARANCE UR: CLEAR — SIGNIFICANT CHANGE UP
APTT BLD: 27.5 SEC — SIGNIFICANT CHANGE UP (ref 27.5–35.5)
BASOPHILS # BLD AUTO: 0.05 K/UL — SIGNIFICANT CHANGE UP (ref 0–0.2)
BASOPHILS NFR BLD AUTO: 0.7 % — SIGNIFICANT CHANGE UP (ref 0–2)
BILIRUB UR-MCNC: NEGATIVE — SIGNIFICANT CHANGE UP
BUN SERPL-MCNC: 14 MG/DL — SIGNIFICANT CHANGE UP (ref 7–23)
CALCIUM SERPL-MCNC: 9.3 MG/DL — SIGNIFICANT CHANGE UP (ref 8.5–10.1)
CHLORIDE SERPL-SCNC: 106 MMOL/L — SIGNIFICANT CHANGE UP (ref 96–108)
CO2 SERPL-SCNC: 28 MMOL/L — SIGNIFICANT CHANGE UP (ref 22–31)
COLOR SPEC: YELLOW — SIGNIFICANT CHANGE UP
CREAT SERPL-MCNC: 0.8 MG/DL — SIGNIFICANT CHANGE UP (ref 0.5–1.3)
DIFF PNL FLD: ABNORMAL
EGFR: 105 ML/MIN/1.73M2 — SIGNIFICANT CHANGE UP
EOSINOPHIL # BLD AUTO: 0.15 K/UL — SIGNIFICANT CHANGE UP (ref 0–0.5)
EOSINOPHIL NFR BLD AUTO: 2 % — SIGNIFICANT CHANGE UP (ref 0–6)
GLUCOSE SERPL-MCNC: 194 MG/DL — HIGH (ref 70–99)
GLUCOSE UR QL: 1000 MG/DL
HCT VFR BLD CALC: 49.3 % — SIGNIFICANT CHANGE UP (ref 39–50)
HGB BLD-MCNC: 17.3 G/DL — HIGH (ref 13–17)
IMM GRANULOCYTES NFR BLD AUTO: 0.1 % — SIGNIFICANT CHANGE UP (ref 0–1.5)
INR BLD: 0.96 RATIO — SIGNIFICANT CHANGE UP (ref 0.88–1.16)
KETONES UR-MCNC: NEGATIVE — SIGNIFICANT CHANGE UP
LEUKOCYTE ESTERASE UR-ACNC: NEGATIVE — SIGNIFICANT CHANGE UP
LYMPHOCYTES # BLD AUTO: 2.03 K/UL — SIGNIFICANT CHANGE UP (ref 1–3.3)
LYMPHOCYTES # BLD AUTO: 27.7 % — SIGNIFICANT CHANGE UP (ref 13–44)
MCHC RBC-ENTMCNC: 31.1 PG — SIGNIFICANT CHANGE UP (ref 27–34)
MCHC RBC-ENTMCNC: 35.1 GM/DL — SIGNIFICANT CHANGE UP (ref 32–36)
MCV RBC AUTO: 88.5 FL — SIGNIFICANT CHANGE UP (ref 80–100)
MONOCYTES # BLD AUTO: 0.67 K/UL — SIGNIFICANT CHANGE UP (ref 0–0.9)
MONOCYTES NFR BLD AUTO: 9.1 % — SIGNIFICANT CHANGE UP (ref 2–14)
NEUTROPHILS # BLD AUTO: 4.43 K/UL — SIGNIFICANT CHANGE UP (ref 1.8–7.4)
NEUTROPHILS NFR BLD AUTO: 60.4 % — SIGNIFICANT CHANGE UP (ref 43–77)
NITRITE UR-MCNC: NEGATIVE — SIGNIFICANT CHANGE UP
PH UR: 5 — SIGNIFICANT CHANGE UP (ref 5–8)
PLATELET # BLD AUTO: 165 K/UL — SIGNIFICANT CHANGE UP (ref 150–400)
POTASSIUM SERPL-MCNC: 4.1 MMOL/L — SIGNIFICANT CHANGE UP (ref 3.5–5.3)
POTASSIUM SERPL-SCNC: 4.1 MMOL/L — SIGNIFICANT CHANGE UP (ref 3.5–5.3)
PROT UR-MCNC: NEGATIVE — SIGNIFICANT CHANGE UP
PROTHROM AB SERPL-ACNC: 11.1 SEC — SIGNIFICANT CHANGE UP (ref 10.5–13.4)
RBC # BLD: 5.57 M/UL — SIGNIFICANT CHANGE UP (ref 4.2–5.8)
RBC # FLD: 12.6 % — SIGNIFICANT CHANGE UP (ref 10.3–14.5)
SODIUM SERPL-SCNC: 138 MMOL/L — SIGNIFICANT CHANGE UP (ref 135–145)
SP GR SPEC: 1.02 — SIGNIFICANT CHANGE UP (ref 1.01–1.02)
UROBILINOGEN FLD QL: NEGATIVE — SIGNIFICANT CHANGE UP
WBC # BLD: 7.34 K/UL — SIGNIFICANT CHANGE UP (ref 3.8–10.5)
WBC # FLD AUTO: 7.34 K/UL — SIGNIFICANT CHANGE UP (ref 3.8–10.5)

## 2022-03-17 PROCEDURE — 86901 BLOOD TYPING SEROLOGIC RH(D): CPT

## 2022-03-17 PROCEDURE — 71046 X-RAY EXAM CHEST 2 VIEWS: CPT | Mod: 26

## 2022-03-17 PROCEDURE — 36415 COLL VENOUS BLD VENIPUNCTURE: CPT

## 2022-03-17 PROCEDURE — 86900 BLOOD TYPING SEROLOGIC ABO: CPT

## 2022-03-17 PROCEDURE — 71046 X-RAY EXAM CHEST 2 VIEWS: CPT

## 2022-03-17 PROCEDURE — 93010 ELECTROCARDIOGRAM REPORT: CPT

## 2022-03-17 PROCEDURE — 81001 URINALYSIS AUTO W/SCOPE: CPT

## 2022-03-17 PROCEDURE — 85610 PROTHROMBIN TIME: CPT

## 2022-03-17 PROCEDURE — 85730 THROMBOPLASTIN TIME PARTIAL: CPT

## 2022-03-17 PROCEDURE — 93005 ELECTROCARDIOGRAM TRACING: CPT

## 2022-03-17 PROCEDURE — 82040 ASSAY OF SERUM ALBUMIN: CPT

## 2022-03-17 PROCEDURE — 80048 BASIC METABOLIC PNL TOTAL CA: CPT

## 2022-03-17 PROCEDURE — G0463: CPT | Mod: 25

## 2022-03-17 PROCEDURE — 86850 RBC ANTIBODY SCREEN: CPT

## 2022-03-17 PROCEDURE — 85025 COMPLETE CBC W/AUTO DIFF WBC: CPT

## 2022-03-17 RX ORDER — ASCORBIC ACID 60 MG
500 TABLET,CHEWABLE ORAL
Qty: 0 | Refills: 0 | DISCHARGE

## 2022-03-17 NOTE — H&P PST ADULT - NSICDXPASTMEDICALHX_GEN_ALL_CORE_FT
PAST MEDICAL HISTORY:  Colon cancer 2021    Gall stone     Hemorrhoids     HLD (hyperlipidemia)     HTN (hypertension)     Mass of colon

## 2022-03-17 NOTE — H&P PST ADULT - ASSESSMENT
57 years old male present to PST prior to laparoscopic cholecystectomy with Dr. Shields.    Plan   1. NPO as per ASU  2. Covid swab scheduled  3. Use E-Z sponge as directed  4. Drink a quart of extra  fluids the day before your surgery.  5. Medical optimization for surgery with Dr. Power  7. CBC, BMP, CMP, PT/ INR and PTT, Urinalysis, Type and Screen, MRSA sent to lab  8. EKG and Chest x- ray done

## 2022-03-17 NOTE — H&P PST ADULT - HISTORY OF PRESENT ILLNESS
57 years old male with gallstones. Patient had a follow up after history of and surgery of colon cancer. CT. Scan every 6 months since colon cancer and resection 1/ 2021. He had  a CT. Scan of the abdomen done 1/ 2022. Gall stone observed. Denies epigastric, back or shoulder pain.  Planned laparoscopic cholecystectomy.

## 2022-03-17 NOTE — H&P PST ADULT - NSICDXPASTSURGICALHX_GEN_ALL_CORE_FT
PAST SURGICAL HISTORY:  H/O colonoscopy 1/8/2021 mass found    History of colon resection chemo after. 1/ 2021

## 2022-03-18 DIAGNOSIS — Z01.818 ENCOUNTER FOR OTHER PREPROCEDURAL EXAMINATION: ICD-10-CM

## 2022-03-18 DIAGNOSIS — K80.20 CALCULUS OF GALLBLADDER WITHOUT CHOLECYSTITIS WITHOUT OBSTRUCTION: ICD-10-CM

## 2022-03-22 RX ORDER — OXYCODONE HYDROCHLORIDE 5 MG/1
10 TABLET ORAL ONCE
Refills: 0 | Status: DISCONTINUED | OUTPATIENT
Start: 2022-03-23 | End: 2022-03-23

## 2022-03-22 RX ORDER — SODIUM CHLORIDE 9 MG/ML
1000 INJECTION, SOLUTION INTRAVENOUS
Refills: 0 | Status: DISCONTINUED | OUTPATIENT
Start: 2022-03-23 | End: 2022-03-23

## 2022-03-22 RX ORDER — FENTANYL CITRATE 50 UG/ML
50 INJECTION INTRAVENOUS
Refills: 0 | Status: DISCONTINUED | OUTPATIENT
Start: 2022-03-23 | End: 2022-03-23

## 2022-03-22 RX ORDER — ONDANSETRON 8 MG/1
4 TABLET, FILM COATED ORAL ONCE
Refills: 0 | Status: DISCONTINUED | OUTPATIENT
Start: 2022-03-23 | End: 2022-03-23

## 2022-03-23 ENCOUNTER — RESULT REVIEW (OUTPATIENT)
Age: 57
End: 2022-03-23

## 2022-03-23 ENCOUNTER — OUTPATIENT (OUTPATIENT)
Dept: INPATIENT UNIT | Facility: HOSPITAL | Age: 57
LOS: 1 days | Discharge: HOME | End: 2022-03-23
Payer: COMMERCIAL

## 2022-03-23 ENCOUNTER — TRANSCRIPTION ENCOUNTER (OUTPATIENT)
Age: 57
End: 2022-03-23

## 2022-03-23 VITALS
HEART RATE: 74 BPM | OXYGEN SATURATION: 100 % | DIASTOLIC BLOOD PRESSURE: 81 MMHG | RESPIRATION RATE: 18 BRPM | SYSTOLIC BLOOD PRESSURE: 152 MMHG | TEMPERATURE: 98 F

## 2022-03-23 VITALS
RESPIRATION RATE: 16 BRPM | HEART RATE: 76 BPM | OXYGEN SATURATION: 98 % | SYSTOLIC BLOOD PRESSURE: 139 MMHG | WEIGHT: 240.08 LBS | HEIGHT: 67 IN | DIASTOLIC BLOOD PRESSURE: 99 MMHG | TEMPERATURE: 98 F

## 2022-03-23 DIAGNOSIS — Z90.49 ACQUIRED ABSENCE OF OTHER SPECIFIED PARTS OF DIGESTIVE TRACT: Chronic | ICD-10-CM

## 2022-03-23 DIAGNOSIS — Z98.890 OTHER SPECIFIED POSTPROCEDURAL STATES: Chronic | ICD-10-CM

## 2022-03-23 DIAGNOSIS — K80.20 CALCULUS OF GALLBLADDER WITHOUT CHOLECYSTITIS WITHOUT OBSTRUCTION: ICD-10-CM

## 2022-03-23 PROCEDURE — C9399: CPT

## 2022-03-23 PROCEDURE — 88304 TISSUE EXAM BY PATHOLOGIST: CPT | Mod: 26

## 2022-03-23 PROCEDURE — 88304 TISSUE EXAM BY PATHOLOGIST: CPT

## 2022-03-23 PROCEDURE — C1889: CPT

## 2022-03-23 RX ORDER — CHOLECALCIFEROL (VITAMIN D3) 125 MCG
2 CAPSULE ORAL
Qty: 0 | Refills: 0 | DISCHARGE

## 2022-03-23 NOTE — BRIEF OPERATIVE NOTE - NSICDXBRIEFPROCEDURE_GEN_ALL_CORE_FT
Explained findings to patient.  The patient expressed understanding.  The patient is scheduled for her Diagnostic mammogram and/or Breast Ultrasound.  Diagnostic Appointment Date/time    3-4@840a  A letter is being mailed to patient explaining findings and recommendations.     PROCEDURES:  Laparoscopic cholecystostomy 23-Mar-2022 10:59:27  LAURENCE JESSICA  Block, transversus abdominis plane, bilateral 23-Mar-2022 10:59:52  LAURENCE JESSICA

## 2022-03-23 NOTE — ASU DISCHARGE PLAN (ADULT/PEDIATRIC) - CARE PROVIDER_API CALL
Augustine Shields)  Surgery  [address], [address]  [place] [address]  Phone: [phone]  Fax: [phone]  Follow Up Time: 2 weeks

## 2022-03-23 NOTE — CHART NOTE - NSCHARTNOTEFT_GEN_A_CORE
SURGERY DATE: 3-22-22    PREOP DIAGNOSIS: Cholelithiasis    POSTOP DIAGNOSIS:  Same    PROCEDURE:  Laparoscipic Cholecystectomy, TAP Block    SURGEON: Augustine Shields MD    ASSISTANT:  Jarocho Sheth MD    ANESTHESIA: GETA    EBL: 30cc    SPECIMEN:  Gallbladder sent to pathology    DRAINS: There were no drains.    COMPLICATIONS : none    INDICATIONS FOR THE PROCEDURE: The patient is a 56-year-old male who on follow-up for his colon cancer obtained a CT scan of the abdomen which showed a very large 5 cm gallstone.  The patient states that he has no symptoms from the gallstone but may have occasional indigestion which may be attributed to his gallstone.  The patient had the CT scan of the abdomen on 2/2/22.  The patient is indicated for Laparoscopic Cholecystectomy      DESCRIPTION OF THE PROCEDURE:  The patient was identified properly via timeout.  The patient was brought into the operating room and was placed onto the operating room table in supine position.  The patient was then given general endotracheal anesthesia and was given preoperative antibiotics.  The patient was then prepped and draped in the usual sterile fashion.  An Exparel mixture was mixed at the back table with 20 mL of Exparel, 30 mL of 0.25% Marcaine and 150 mL of normal saline.  A Veress needle was placed in left upper quadrant.  The abdomen was insufflated to 15 mmHg.  Once the abdomen was insufflated, the Exparel mixture was given subcutaneously at the sites of incision.  An incision was made within the umbilicus and a 5 mm trocar was placed in the abdomen.  The laparoscope was inserted and there was no injury on initial trocar placement or initial Veress needle placement.  A transversus abdominus plane Block was then conducted by placing 20 mL of the Exparel mixture preperitoneal at the distribution of the spinal nerves on the lateral abdominal wall.  This was started at the costal margin at the mid axillary line.  20 mL of the Exparel mixture was placed in this area.  Another 20 mL was placed 4 cm caudal to this area.  Another 20 cm was placed 4 cm caudal to this area and another 15 mL was placed 4 cm caudal to this area.  This was repeated on the opposite side of the body in a similar fashion.  The rest of the Exparel was placed into the subcutaneous tissues and preperitoneal space at every trocar site.  25 mm trochars were placed into the right upper quadrant.  A 12 mm trocar was placed into the left upper quadrant.  The gallbladder was then visualized.  The gallbladder was then brought over the liver using a grasper and the infundibulum was grasped and retracted towards the appendix.  This maneuver exposed Calot's triangle.  The cystic artery and cystic duct were identified.  They were circumferentially dissected and the critical view was obtained.  The cystic duct was then ligated with an endoloop as the duct was to large for a clip applier.  Two such endoloops were used.  The cystic artery was then taken with the LigaSure device.  The gallbladder was then removed from the gallbladder wall fossa using electrocautery.  Hemostasis was obtained.  The gallbladder was then placed into an Endo catch bag and removed from the 12 mm trocar.  The abdomen was irrigated and suctioned.  Hemostasis was achieved.  The trochars were then removed and the skin was closed with 4-0 Monocryl.  Dermabond was applied over that.  The patient tolerated the procedure well and was brought to the recovery room in stable condition.

## 2022-03-23 NOTE — ASU PATIENT PROFILE, ADULT - FALL HARM RISK - UNIVERSAL INTERVENTIONS
Bed in lowest position, wheels locked, appropriate side rails in place/Call bell, personal items and telephone in reach/Instruct patient to call for assistance before getting out of bed or chair/Non-slip footwear when patient is out of bed/[place] to call system/Physically safe environment - no spills, clutter or unnecessary equipment/Purposeful Proactive Rounding/Room/bathroom lighting operational, light cord in reach

## 2022-03-23 NOTE — ASU DISCHARGE PLAN (ADULT/PEDIATRIC) - NS MD DC FALL RISK RISK
For information on Fall & Injury Prevention, visit: [email or web address] OR  [email or web address] OR  https://www.cdc.gov/steadi/patient.html

## 2022-03-23 NOTE — ASU PREOP CHECKLIST - TEMPERATURE IN FAHRENHEIT (DEGREES F)
Clinic Care Coordination Contact    Situation: Patient chart reviewed by care coordinator.    Background:   Please refer to 2017 Care Coordination entry and 2017 Telephone Encounter.     Assessment:   Patient continues to be followed by [facility] and [facility] ([facility] and [facility]).    See Care Team for details.    Patient is scheduled to see PCP today for OV @ 1020hours.     Next 5 appointments (look out 90 days)     Dec 22, 2017 10:20 AM CST   Well Child with Andria Schuster Donell Vogel MD   [facility] ([facility]    [id number] [place] [id number] [phone]                  Plan/Recommendations:    CCRN will continue to follow patient moving forward; working with PCP, [facility] and [facility] in addition to her home care agency.     Nancy Bucio, RN  [facility]  Clinic Care Coordinator - [place] and [place] Locations   Direct:  [phone] (voicemail available)      98

## 2022-03-29 DIAGNOSIS — K80.12 CALCULUS OF GALLBLADDER WITH ACUTE AND CHRONIC CHOLECYSTITIS WITHOUT OBSTRUCTION: ICD-10-CM

## 2022-03-29 DIAGNOSIS — E78.2 MIXED HYPERLIPIDEMIA: ICD-10-CM

## 2022-03-29 DIAGNOSIS — K80.20 CALCULUS OF GALLBLADDER WITHOUT CHOLECYSTITIS WITHOUT OBSTRUCTION: ICD-10-CM

## 2022-03-29 DIAGNOSIS — I10 ESSENTIAL (PRIMARY) HYPERTENSION: ICD-10-CM

## 2022-03-29 DIAGNOSIS — Z85.038 PERSONAL HISTORY OF OTHER MALIGNANT NEOPLASM OF LARGE INTESTINE: ICD-10-CM

## 2022-04-22 PROBLEM — K80.20 CALCULUS OF GALLBLADDER WITHOUT CHOLECYSTITIS WITHOUT OBSTRUCTION: Chronic | Status: ACTIVE | Noted: 2022-03-17

## 2022-04-22 PROBLEM — C18.9 MALIGNANT NEOPLASM OF COLON, UNSPECIFIED: Chronic | Status: ACTIVE | Noted: 2022-03-17

## 2022-04-22 PROBLEM — K64.9 UNSPECIFIED HEMORRHOIDS: Chronic | Status: ACTIVE | Noted: 2022-03-17

## 2022-05-23 ENCOUNTER — OUTPATIENT (OUTPATIENT)
Dept: OUTPATIENT SERVICES | Facility: HOSPITAL | Age: 57
LOS: 1 days | Discharge: HOME | End: 2022-05-23

## 2022-05-23 DIAGNOSIS — Z98.890 OTHER SPECIFIED POSTPROCEDURAL STATES: Chronic | ICD-10-CM

## 2022-05-23 DIAGNOSIS — C18.9 MALIGNANT NEOPLASM OF COLON, UNSPECIFIED: ICD-10-CM

## 2022-05-23 DIAGNOSIS — Z90.49 ACQUIRED ABSENCE OF OTHER SPECIFIED PARTS OF DIGESTIVE TRACT: Chronic | ICD-10-CM

## 2022-05-24 NOTE — ASSESSMENT
[FreeTextEntry1] : Patient is a 56 y.o. with a stage IIA colon cancer. MMRP intact, proximal transverse colon, s/p right hemicolectomy for pT3,pN0 disease. \par 8/2021 - Completed Adjuvant Xeloda.  \par \par \par 1/14/22 - Colonoscopy ([facility]) - anastomosis normal.  Normal mucosa in whole colon.  Internal hemorrhoids. \par Instructed to repeat in 1 year. \par \par Continue surveillance as per NCCN - Exam/labs/CEA Q4 months x 2 years, then Q6 months. \par CT scans Q6 - 12 months x 5 years.  \par \par \par NEGIN ZAMBRANO \par Auugstine Vasquez (GI) \par Rodger Shields (General surgeon)\par

## 2022-05-24 NOTE — PHYSICAL EXAM
[Normal] : affect appropriate [de-identified] : overweight middle aged male [de-identified] : anicteric [de-identified] : no c/c/e [de-identified] : no rashes.  Legs with multiple cuts and scrapes.  "Xeloda hands" - shiny, fissures in fingertips, slightly dry creases that look  like they were recently moisturized.

## 2022-05-24 NOTE — HISTORY OF PRESENT ILLNESS
[Disease: _____________________] : Disease: [unfilled] [T: ___] : T[unfilled] [N: ___] : N[unfilled] [AJCC Stage: ____] : AJCC Stage: [unfilled] [de-identified] : CHILO PARRA is a 57 y.o. with a PMH significant for HTN and HLD, who we are following for a stage IIA colon cancer.  \par \par Presented with iron deficiency anemia on routine blood work and was referred for a GI evaluation.  Patient had no GI symptoms.\par 1/8/21 - Colonoscopy (Dr. Rosas) - showed a large ulcerated tumor in the proximal transverse colon. Mas was partially circumferential (1/3 of lumen circumference) and measured 6cm in length and 5mm in diameter. Also had ascending colon polyp which was benign.   \par Path - very minute fragment of colon mucosa with at least intramucosal adenocarcinoma with focus suspicious for LVI.   More advanced lesion could not be excluded.  \par EGD same day unremarkable. \par 1/14/21 - CT CAP - no evidence of metastatic disease.\par 1/27/21 - Right hemicolectomy - poorly diff adenocarcinoma 2.5 cm infiltrates into pericolonic tissue LVI +  0/14 LN .\par MMR intact.   pT3pN0 = Stage IIA\par Plan is for adjuvant chemotherapy with capecitabine x 6 months.  \par Had IV iron x 2 for iron deficiency anemia.\par 3/4/21 - 8/2021 - Adjuvant Xeloda. \par \par Patient has chronically elevated CEA ranging ~ 7 - 9.  [de-identified] : poorly diff adenocarcinoma  [de-identified] : 1/21/22 - Guardiant Reveal - ctDNA not detected.  [de-identified] : 2/2/22 - CT C/A/P - Stable small pulmonary nodules. Hepatic steatosis. No evidence of abdominal metastatic disease. Cholelithiasis, including a 5 cm calcified stone. \par 3/23/22 - L/S Choleystectomy ([facility])\par Denies any changes in his family, medical, or social history since his last visit of 1/21/22.

## 2022-05-24 NOTE — REASON FOR VISIT
[Follow-Up Visit] : a follow-up [FreeTextEntry2] : Colon Cancer stage IIA -  Completed adjuvant Xeloda ~ 9 months ago

## 2022-05-25 ENCOUNTER — APPOINTMENT (OUTPATIENT)
Dept: HEMATOLOGY ONCOLOGY | Facility: CLINIC | Age: 57
End: 2022-05-25

## 2022-06-17 ENCOUNTER — RESULT REVIEW (OUTPATIENT)
Age: 57
End: 2022-06-17

## 2022-06-17 ENCOUNTER — APPOINTMENT (OUTPATIENT)
Dept: HEMATOLOGY ONCOLOGY | Facility: CLINIC | Age: 57
End: 2022-06-17
Payer: COMMERCIAL

## 2022-06-17 VITALS
RESPIRATION RATE: 17 BRPM | TEMPERATURE: 98 F | WEIGHT: 245 LBS | BODY MASS INDEX: 37.25 KG/M2 | OXYGEN SATURATION: 97 % | SYSTOLIC BLOOD PRESSURE: 132 MMHG | HEART RATE: 95 BPM | DIASTOLIC BLOOD PRESSURE: 79 MMHG

## 2022-06-17 LAB
BASOPHILS # BLD AUTO: 0.04 K/UL — SIGNIFICANT CHANGE UP (ref 0–0.2)
BASOPHILS NFR BLD AUTO: 0.6 % — SIGNIFICANT CHANGE UP (ref 0–2)
CEA SERPL-MCNC: 17 NG/ML — HIGH (ref 0–3.8)
EOSINOPHIL # BLD AUTO: 0.24 K/UL — SIGNIFICANT CHANGE UP (ref 0–0.5)
EOSINOPHIL NFR BLD AUTO: 3.4 % — SIGNIFICANT CHANGE UP (ref 0–6)
HCT VFR BLD CALC: 48.5 % — SIGNIFICANT CHANGE UP (ref 39–50)
HGB BLD-MCNC: 17 G/DL — SIGNIFICANT CHANGE UP (ref 13–17)
IMM GRANULOCYTES NFR BLD AUTO: 0.3 % — SIGNIFICANT CHANGE UP (ref 0–1.5)
LYMPHOCYTES # BLD AUTO: 2.07 K/UL — SIGNIFICANT CHANGE UP (ref 1–3.3)
LYMPHOCYTES # BLD AUTO: 29.3 % — SIGNIFICANT CHANGE UP (ref 13–44)
MCHC RBC-ENTMCNC: 30.9 PG — SIGNIFICANT CHANGE UP (ref 27–34)
MCHC RBC-ENTMCNC: 35.1 GM/DL — SIGNIFICANT CHANGE UP (ref 32–36)
MCV RBC AUTO: 88 FL — SIGNIFICANT CHANGE UP (ref 80–100)
MONOCYTES # BLD AUTO: 0.68 K/UL — SIGNIFICANT CHANGE UP (ref 0–0.9)
MONOCYTES NFR BLD AUTO: 9.6 % — SIGNIFICANT CHANGE UP (ref 2–14)
NEUTROPHILS # BLD AUTO: 4.02 K/UL — SIGNIFICANT CHANGE UP (ref 1.8–7.4)
NEUTROPHILS NFR BLD AUTO: 56.8 % — SIGNIFICANT CHANGE UP (ref 43–77)
NRBC # BLD: 0 /100 WBCS — SIGNIFICANT CHANGE UP (ref 0–0)
PLATELET # BLD AUTO: 196 K/UL — SIGNIFICANT CHANGE UP (ref 150–400)
RBC # BLD: 5.51 M/UL — SIGNIFICANT CHANGE UP (ref 4.2–5.8)
RBC # FLD: 12.9 % — SIGNIFICANT CHANGE UP (ref 10.3–14.5)
WBC # BLD: 7.07 K/UL — SIGNIFICANT CHANGE UP (ref 3.8–10.5)
WBC # FLD AUTO: 7.07 K/UL — SIGNIFICANT CHANGE UP (ref 3.8–10.5)

## 2022-06-17 PROCEDURE — 99214 OFFICE O/P EST MOD 30 MIN: CPT

## 2022-06-17 NOTE — ASSESSMENT
[FreeTextEntry1] : 56 y/o male with stage II B  T3, N0 cancer of proximal transverse colon s/p  R hemicolectomy 1/27/21 .\par High risk features : poorly differentiated histology with LVI. Tumor is MSI- stable. \par \par S/p adjuvant capecitabine  x 6 months - completed August 2021.\par \par Persistent elevated CEA - pending today.\par Most recent  CT CAP   Jan 2022 LAURI . Tiny pulmonary nodules- stable . \par ctDNA undetectable Jan 2022  pending today \par \par Close monitoring.\par Exam/ labs/ CEA every 4 months.\par Scans CT CAP with contrast in 6 months/ will be due in August .\par \par Colonoscopy ( will be due ~ Jan 2023 ). \par \par If scans OK- return visit in November .\par \par

## 2022-06-17 NOTE — PHYSICAL EXAM
[Fully active, able to carry on all pre-disease performance without restriction] : Status 0 - Fully active, able to carry on all pre-disease performance without restriction [Normal] : well developed, well nourished, in no acute distress [de-identified] : looks great

## 2022-06-17 NOTE — REVIEW OF SYSTEMS
[Patient Intake Form Reviewed] : Patient intake form was reviewed [Joint Pain] : no joint pain [Joint Stiffness] : no joint stiffness [Negative] : Musculoskeletal

## 2022-06-17 NOTE — HISTORY OF PRESENT ILLNESS
[Disease: _____________________] : Disease: [unfilled] [T: ___] : T[unfilled] [N: ___] : N[unfilled] [AJCC Stage: ____] : AJCC Stage: [unfilled] [de-identified] : 54 y/o male  found to have mild iron deficiency anemia of routine blood work. Colonoscopy ( Dr Rosas) revealed a large  tumor in the proximal transverse colon. Patient had no GI symptoms.\par CT CAP  1/14/21 : no evidence  of metastatic disease.\par \par 1/27/21 R hemicolectomy : poorly diff adenocarcinoma 2.5 cm infiltrates into pericolonic tissue LVI +  0/14 LN .\par \par Adjuvant capecitabine x 6 months  3/4/21-end of August 2021 \par \par s/p iv iron for BELINDA \par \par persistent chronic elevation in CEA ~ 7\par \par CT CAP 9/1/21 : no metastatic disease  hepatic steatosis, unchanged tiny pulmonary nodules  RML and RLL \par CT CAP 2/2/22  no change \par ctDNA undetectable ( Jan 2022)  [de-identified] : poorly differentiated adenocarcinoma G 3  [de-identified] : MMRP  intact   MSI  stable  [de-identified] : recent colonoscopy   Jan 2022 - OK, told : next one in one year \par \par 3/23/22 cholecystectomy for acute and chronic cholecystitis noted on routine scan.\par \par Feels great. Has no c/o .

## 2022-06-18 LAB
ALBUMIN SERPL ELPH-MCNC: 4.7 G/DL — SIGNIFICANT CHANGE UP (ref 3.3–5)
ALP SERPL-CCNC: 70 U/L — SIGNIFICANT CHANGE UP (ref 40–120)
ALT FLD-CCNC: 55 U/L — HIGH (ref 10–45)
ANION GAP SERPL CALC-SCNC: 12 MMOL/L — SIGNIFICANT CHANGE UP (ref 5–17)
AST SERPL-CCNC: 47 U/L — HIGH (ref 10–40)
BILIRUB SERPL-MCNC: 0.9 MG/DL — SIGNIFICANT CHANGE UP (ref 0.2–1.2)
BUN SERPL-MCNC: 12 MG/DL — SIGNIFICANT CHANGE UP (ref 7–23)
CALCIUM SERPL-MCNC: 9.9 MG/DL — SIGNIFICANT CHANGE UP (ref 8.4–10.5)
CHLORIDE SERPL-SCNC: 99 MMOL/L — SIGNIFICANT CHANGE UP (ref 96–108)
CO2 SERPL-SCNC: 26 MMOL/L — SIGNIFICANT CHANGE UP (ref 22–31)
CREAT SERPL-MCNC: 1.17 MG/DL — SIGNIFICANT CHANGE UP (ref 0.5–1.3)
EGFR: 74 ML/MIN/1.73M2 — SIGNIFICANT CHANGE UP
GLUCOSE SERPL-MCNC: 168 MG/DL — HIGH (ref 70–99)
POTASSIUM SERPL-MCNC: 4.5 MMOL/L — SIGNIFICANT CHANGE UP (ref 3.5–5.3)
POTASSIUM SERPL-SCNC: 4.5 MMOL/L — SIGNIFICANT CHANGE UP (ref 3.5–5.3)
PROT SERPL-MCNC: 7.2 G/DL — SIGNIFICANT CHANGE UP (ref 6–8.3)
SODIUM SERPL-SCNC: 138 MMOL/L — SIGNIFICANT CHANGE UP (ref 135–145)

## 2022-07-12 ENCOUNTER — RESULT REVIEW (OUTPATIENT)
Age: 57
End: 2022-07-12

## 2022-08-08 ENCOUNTER — OUTPATIENT (OUTPATIENT)
Dept: OUTPATIENT SERVICES | Facility: HOSPITAL | Age: 57
LOS: 1 days | End: 2022-08-08
Payer: COMMERCIAL

## 2022-08-08 ENCOUNTER — APPOINTMENT (OUTPATIENT)
Dept: CT IMAGING | Facility: CLINIC | Age: 57
End: 2022-08-08

## 2022-08-08 DIAGNOSIS — Z90.49 ACQUIRED ABSENCE OF OTHER SPECIFIED PARTS OF DIGESTIVE TRACT: Chronic | ICD-10-CM

## 2022-08-08 DIAGNOSIS — C18.4 MALIGNANT NEOPLASM OF TRANSVERSE COLON: ICD-10-CM

## 2022-08-08 DIAGNOSIS — Z00.8 ENCOUNTER FOR OTHER GENERAL EXAMINATION: ICD-10-CM

## 2022-08-08 DIAGNOSIS — Z98.890 OTHER SPECIFIED POSTPROCEDURAL STATES: Chronic | ICD-10-CM

## 2022-08-08 PROCEDURE — 74177 CT ABD & PELVIS W/CONTRAST: CPT | Mod: 26

## 2022-08-08 PROCEDURE — 71260 CT THORAX DX C+: CPT | Mod: 26

## 2022-08-08 PROCEDURE — 71260 CT THORAX DX C+: CPT

## 2022-08-08 PROCEDURE — 74177 CT ABD & PELVIS W/CONTRAST: CPT

## 2022-08-15 ENCOUNTER — NON-APPOINTMENT (OUTPATIENT)
Age: 57
End: 2022-08-15

## 2022-08-15 RX ORDER — CAPECITABINE 500 MG/1
500 TABLET ORAL
Qty: 112 | Refills: 7 | Status: DISCONTINUED | COMMUNITY
Start: 2021-02-23 | End: 2022-08-15

## 2022-08-15 RX ORDER — SODIUM SULFATE, POTASSIUM SULFATE, MAGNESIUM SULFATE 17.5; 3.13; 1.6 G/ML; G/ML; G/ML
17.5-3.13-1.6 SOLUTION, CONCENTRATE ORAL
Qty: 1 | Refills: 0 | Status: DISCONTINUED | COMMUNITY
Start: 2022-01-06 | End: 2022-08-15

## 2022-08-17 ENCOUNTER — NON-APPOINTMENT (OUTPATIENT)
Age: 57
End: 2022-08-17

## 2022-08-24 ENCOUNTER — RESULT REVIEW (OUTPATIENT)
Age: 57
End: 2022-08-24

## 2022-08-24 ENCOUNTER — TRANSCRIPTION ENCOUNTER (OUTPATIENT)
Age: 57
End: 2022-08-24

## 2022-08-24 ENCOUNTER — OUTPATIENT (OUTPATIENT)
Dept: INPATIENT UNIT | Facility: HOSPITAL | Age: 57
LOS: 1 days | Discharge: HOME | End: 2022-08-24
Payer: COMMERCIAL

## 2022-08-24 VITALS
DIASTOLIC BLOOD PRESSURE: 74 MMHG | RESPIRATION RATE: 16 BRPM | TEMPERATURE: 97 F | OXYGEN SATURATION: 97 % | HEART RATE: 71 BPM | SYSTOLIC BLOOD PRESSURE: 118 MMHG

## 2022-08-24 VITALS
DIASTOLIC BLOOD PRESSURE: 87 MMHG | SYSTOLIC BLOOD PRESSURE: 131 MMHG | RESPIRATION RATE: 16 BRPM | HEIGHT: 68 IN | TEMPERATURE: 98 F | OXYGEN SATURATION: 98 % | HEART RATE: 78 BPM | WEIGHT: 235.01 LBS

## 2022-08-24 DIAGNOSIS — C18.4 MALIGNANT NEOPLASM OF TRANSVERSE COLON: ICD-10-CM

## 2022-08-24 DIAGNOSIS — Z98.890 OTHER SPECIFIED POSTPROCEDURAL STATES: Chronic | ICD-10-CM

## 2022-08-24 DIAGNOSIS — Z90.49 ACQUIRED ABSENCE OF OTHER SPECIFIED PARTS OF DIGESTIVE TRACT: Chronic | ICD-10-CM

## 2022-08-24 LAB
ANION GAP SERPL CALC-SCNC: 3 MMOL/L — LOW (ref 5–17)
BUN SERPL-MCNC: 11 MG/DL — SIGNIFICANT CHANGE UP (ref 7–23)
CALCIUM SERPL-MCNC: 9.5 MG/DL — SIGNIFICANT CHANGE UP (ref 8.5–10.1)
CHLORIDE SERPL-SCNC: 106 MMOL/L — SIGNIFICANT CHANGE UP (ref 96–108)
CO2 SERPL-SCNC: 29 MMOL/L — SIGNIFICANT CHANGE UP (ref 22–31)
CREAT SERPL-MCNC: 0.88 MG/DL — SIGNIFICANT CHANGE UP (ref 0.5–1.3)
EGFR: 102 ML/MIN/1.73M2 — SIGNIFICANT CHANGE UP
GLUCOSE SERPL-MCNC: 172 MG/DL — HIGH (ref 70–99)
HCT VFR BLD CALC: 49.3 % — SIGNIFICANT CHANGE UP (ref 39–50)
HGB BLD-MCNC: 17.2 G/DL — HIGH (ref 13–17)
INR BLD: 1.07 RATIO — SIGNIFICANT CHANGE UP (ref 0.88–1.16)
MCHC RBC-ENTMCNC: 30.9 PG — SIGNIFICANT CHANGE UP (ref 27–34)
MCHC RBC-ENTMCNC: 34.9 GM/DL — SIGNIFICANT CHANGE UP (ref 32–36)
MCV RBC AUTO: 88.5 FL — SIGNIFICANT CHANGE UP (ref 80–100)
PLATELET # BLD AUTO: 164 K/UL — SIGNIFICANT CHANGE UP (ref 150–400)
POTASSIUM SERPL-MCNC: 4.2 MMOL/L — SIGNIFICANT CHANGE UP (ref 3.5–5.3)
POTASSIUM SERPL-SCNC: 4.2 MMOL/L — SIGNIFICANT CHANGE UP (ref 3.5–5.3)
PROTHROM AB SERPL-ACNC: 12.4 SEC — SIGNIFICANT CHANGE UP (ref 10.5–13.4)
RBC # BLD: 5.57 M/UL — SIGNIFICANT CHANGE UP (ref 4.2–5.8)
RBC # FLD: 12.7 % — SIGNIFICANT CHANGE UP (ref 10.3–14.5)
SODIUM SERPL-SCNC: 138 MMOL/L — SIGNIFICANT CHANGE UP (ref 135–145)
WBC # BLD: 5.34 K/UL — SIGNIFICANT CHANGE UP (ref 3.8–10.5)
WBC # FLD AUTO: 5.34 K/UL — SIGNIFICANT CHANGE UP (ref 3.8–10.5)

## 2022-08-24 PROCEDURE — 88172 CYTP DX EVAL FNA 1ST EA SITE: CPT

## 2022-08-24 PROCEDURE — 85610 PROTHROMBIN TIME: CPT

## 2022-08-24 PROCEDURE — 85027 COMPLETE CBC AUTOMATED: CPT

## 2022-08-24 PROCEDURE — 93010 ELECTROCARDIOGRAM REPORT: CPT

## 2022-08-24 PROCEDURE — 77012 CT SCAN FOR NEEDLE BIOPSY: CPT | Mod: 26

## 2022-08-24 PROCEDURE — 49180 BIOPSY ABDOMINAL MASS: CPT

## 2022-08-24 PROCEDURE — 77012 CT SCAN FOR NEEDLE BIOPSY: CPT

## 2022-08-24 PROCEDURE — 80048 BASIC METABOLIC PNL TOTAL CA: CPT

## 2022-08-24 PROCEDURE — 88305 TISSUE EXAM BY PATHOLOGIST: CPT

## 2022-08-24 PROCEDURE — 93005 ELECTROCARDIOGRAM TRACING: CPT

## 2022-08-24 PROCEDURE — 88305 TISSUE EXAM BY PATHOLOGIST: CPT | Mod: 26

## 2022-08-24 PROCEDURE — 36415 COLL VENOUS BLD VENIPUNCTURE: CPT

## 2022-08-24 RX ORDER — ONDANSETRON 8 MG/1
4 TABLET, FILM COATED ORAL ONCE
Refills: 0 | Status: DISCONTINUED | OUTPATIENT
Start: 2022-08-24 | End: 2022-08-24

## 2022-08-24 RX ORDER — CHOLECALCIFEROL (VITAMIN D3) 125 MCG
1 CAPSULE ORAL
Qty: 0 | Refills: 0 | DISCHARGE

## 2022-08-24 RX ORDER — SODIUM CHLORIDE 9 MG/ML
1000 INJECTION INTRAMUSCULAR; INTRAVENOUS; SUBCUTANEOUS
Refills: 0 | Status: DISCONTINUED | OUTPATIENT
Start: 2022-08-24 | End: 2022-08-24

## 2022-08-24 NOTE — ASU PATIENT PROFILE, ADULT - FALL HARM RISK - UNIVERSAL INTERVENTIONS
Bed in lowest position, wheels locked, appropriate side rails in place/Call bell, personal items and telephone in reach/Instruct patient to call for assistance before getting out of bed or chair/Non-slip footwear when patient is out of bed/[place] to call system/Physically safe environment - no spills, clutter or unnecessary equipment/Purposeful Proactive Rounding/Room/bathroom lighting operational, light cord in reach

## 2022-08-24 NOTE — ASU DISCHARGE PLAN (ADULT/PEDIATRIC) - NURSING INSTRUCTIONS
For any problems or concerns,contact your doctor. [facility] patients should call the [facility]. If you cannot reach the doctor or clinic, call [facility] Emergency Department at [phone] or go to your local Emergency Department.  A responsible adult should be with you for the rest of the day and night for your safety and to help you if you needed. Resume your medications as listed on the attached Medication Record. Begin with liquids and light food ( tea, toast, Jello, soups). Advance to what you normally eat. Liquids should taken in adequate amounts today.     CALL the DOCTOR:    -Fever greater than  101F  - Signs  of infection such as : increase pain,swelling,redness,or a bad  smell coming from the wound.  -Excessive amount of bleeding.  - Any pain that appears to be getting worse.  - Vomiting  -  If you have  not urinated 8 hours after surgery or have any difficulty urinating.     A responsible adult should be with you for the rest of the day and night for your safety and to help you if you needed.    Review attached FACT SHEET if applicable.

## 2022-08-24 NOTE — ASU PATIENT PROFILE, ADULT - TOBACCO USE
Status post left above-knee amputation. Patient be transferred to the floor we can resume all medications. Patient's anticoagulation Xarelto will be stopped does not need any more. We will consult physical therapy patient most likely will need out patient rehab.     Marden Night Never smoker

## 2022-08-24 NOTE — ASU DISCHARGE PLAN (ADULT/PEDIATRIC) - NS MD DC FALL RISK RISK
For information on Fall & Injury Prevention, visit: [email or web address] OR  [email or web address] OR  https://www.cdc.gov/steadi/patient.html

## 2022-08-27 ENCOUNTER — OUTPATIENT (OUTPATIENT)
Dept: OUTPATIENT SERVICES | Facility: HOSPITAL | Age: 57
LOS: 1 days | Discharge: HOME | End: 2022-08-27

## 2022-08-27 DIAGNOSIS — C18.9 MALIGNANT NEOPLASM OF COLON, UNSPECIFIED: ICD-10-CM

## 2022-08-27 DIAGNOSIS — Z98.890 OTHER SPECIFIED POSTPROCEDURAL STATES: Chronic | ICD-10-CM

## 2022-08-27 DIAGNOSIS — Z90.49 ACQUIRED ABSENCE OF OTHER SPECIFIED PARTS OF DIGESTIVE TRACT: Chronic | ICD-10-CM

## 2022-08-30 DIAGNOSIS — R59.0 LOCALIZED ENLARGED LYMPH NODES: ICD-10-CM

## 2022-08-31 ENCOUNTER — APPOINTMENT (OUTPATIENT)
Dept: HEMATOLOGY ONCOLOGY | Facility: CLINIC | Age: 57
End: 2022-08-31

## 2022-08-31 VITALS
HEART RATE: 67 BPM | OXYGEN SATURATION: 97 % | RESPIRATION RATE: 18 BRPM | TEMPERATURE: 98.4 F | SYSTOLIC BLOOD PRESSURE: 121 MMHG | BODY MASS INDEX: 35.59 KG/M2 | WEIGHT: 234.06 LBS | DIASTOLIC BLOOD PRESSURE: 74 MMHG

## 2022-08-31 PROCEDURE — 99215 OFFICE O/P EST HI 40 MIN: CPT

## 2022-08-31 NOTE — ASSESSMENT
[FreeTextEntry1] : 56 y/o male with stage II B  T3, N0 cancer of proximal transverse colon s/p  R hemicolectomy 1/27/21 .\par High risk features : poorly differentiated histology with LVI. Tumor is MSI- stable. \par \par S/p adjuvant capecitabine  x 6 months - completed August 2021.\par Persistently elevated CEA with negative scans.\par \par Scans in August showed small but enlarging paraaortic adenopathy and new 2 mm pulm nodule \par CT guided biopsy of abdominal adenopathy confirmed metastatic colon adenocarcinoma.\par \par \par Long discussion with the patient and his wife about his current diagnosis/ extend of disease, treatment goals, prognosis\par Obtain tissue based NGS panel including LEONARD BRAF HER 2 and other less common mutations \par PET/CT \par Will need systemic therapy : first line : FOLFOX Avastin .\par No  proven role for surgery with distant los mets.\par ? RT if oligometastatic  disease.\par \par Needs mediport\par Chemotherapy talk with NP\par He will be away next week . Plan to start chemotherapy week of Sept 19. \par Will also check if clinical trials available for first line treatment of colon cancer He raman be interested if trial available) \par \par CC: Dr SILVINA Lizama\par \par Addendum\par Clinical  phase II trial of vot D supplementation with standard  chemotherapy- available at [facility] - patient was interested in that trial - will discuss this week when he comes for chemotherapy talk.

## 2022-08-31 NOTE — REVIEW OF SYSTEMS
[Patient Intake Form Reviewed] : Patient intake form was reviewed [Negative] : Allergic/Immunologic [Diarrhea] : diarrhea [Joint Pain] : no joint pain [Joint Stiffness] : no joint stiffness

## 2022-08-31 NOTE — HISTORY OF PRESENT ILLNESS
[Disease: _____________________] : Disease: [unfilled] [T: ___] : T[unfilled] [N: ___] : N[unfilled] [AJCC Stage: ____] : AJCC Stage: [unfilled] [de-identified] : 54 y/o male  found to have mild iron deficiency anemia of routine blood work. Colonoscopy ( Dr Rosas) revealed a large  tumor in the proximal transverse colon. Patient had no GI symptoms.\par CT CAP  1/14/21 : no evidence  of metastatic disease.\par \par 1/27/21 R hemicolectomy : poorly diff adenocarcinoma 2.5 cm infiltrates into pericolonic tissue LVI +  0/14 LN .\par \par Adjuvant capecitabine x 6 months  3/4/21-end of August 2021 \par \par s/p iv iron for BELINDA \par \par persistent chronic elevation in CEA ~ 7 with negative scans.\par \par In August 2022 CT scans showed enlarging paraaortic GELY and new tiny pulmonary nodule.\par 8/24/22  IR biopsy of aortocaval LN - metastatic adenoca with necrosis, morphologically c/w colon primary  \par \par \par  [de-identified] : poorly differentiated adenocarcinoma G 3  [de-identified] : MMRP  intact   MSI  stable  [de-identified] : Feels well Occasional diarrhea since his cholecystectomy few months ago.

## 2022-08-31 NOTE — REASON FOR VISIT
[Follow-Up Visit] : a follow-up [FreeTextEntry2] : hx of stage II colon cancer  and now metastatic disease

## 2022-09-01 DIAGNOSIS — Z08 ENCOUNTER FOR FOLLOW-UP EXAMINATION AFTER COMPLETED TREATMENT FOR MALIGNANT NEOPLASM: ICD-10-CM

## 2022-09-01 DIAGNOSIS — Z85.038 ENCOUNTER FOR FOLLOW-UP EXAMINATION AFTER COMPLETED TREATMENT FOR MALIGNANT NEOPLASM: ICD-10-CM

## 2022-09-01 DIAGNOSIS — E55.9 VITAMIN D DEFICIENCY, UNSPECIFIED: ICD-10-CM

## 2022-09-01 DIAGNOSIS — Z79.899 OTHER LONG TERM (CURRENT) DRUG THERAPY: ICD-10-CM

## 2022-09-01 DIAGNOSIS — C18.4 MALIGNANT NEOPLASM OF TRANSVERSE COLON: ICD-10-CM

## 2022-09-02 ENCOUNTER — APPOINTMENT (OUTPATIENT)
Dept: HEMATOLOGY ONCOLOGY | Facility: CLINIC | Age: 57
End: 2022-09-02

## 2022-09-02 VITALS
SYSTOLIC BLOOD PRESSURE: 128 MMHG | HEART RATE: 85 BPM | RESPIRATION RATE: 17 BRPM | TEMPERATURE: 98.1 F | DIASTOLIC BLOOD PRESSURE: 78 MMHG | OXYGEN SATURATION: 97 % | WEIGHT: 233 LBS | BODY MASS INDEX: 35.31 KG/M2 | HEIGHT: 68 IN

## 2022-09-02 DIAGNOSIS — R97.0 ELEVATED CARCINOEMBRYONIC ANTIGEN [CEA]: ICD-10-CM

## 2022-09-02 PROCEDURE — 99215 OFFICE O/P EST HI 40 MIN: CPT

## 2022-09-05 PROBLEM — R97.0 ELEVATED CEA: Status: ACTIVE | Noted: 2021-06-24

## 2022-09-06 DIAGNOSIS — C78.00 SECONDARY MALIGNANT NEOPLASM OF UNSPECIFIED LUNG: ICD-10-CM

## 2022-09-06 DIAGNOSIS — R97.0 ELEVATED CARCINOEMBRYONIC ANTIGEN [CEA]: ICD-10-CM

## 2022-09-13 ENCOUNTER — APPOINTMENT (OUTPATIENT)
Dept: NUCLEAR MEDICINE | Facility: CLINIC | Age: 57
End: 2022-09-13

## 2022-09-13 ENCOUNTER — OUTPATIENT (OUTPATIENT)
Dept: OUTPATIENT SERVICES | Facility: HOSPITAL | Age: 57
LOS: 1 days | End: 2022-09-13
Payer: COMMERCIAL

## 2022-09-13 DIAGNOSIS — C18.4 MALIGNANT NEOPLASM OF TRANSVERSE COLON: ICD-10-CM

## 2022-09-13 DIAGNOSIS — Z98.890 OTHER SPECIFIED POSTPROCEDURAL STATES: Chronic | ICD-10-CM

## 2022-09-13 DIAGNOSIS — Z90.49 ACQUIRED ABSENCE OF OTHER SPECIFIED PARTS OF DIGESTIVE TRACT: Chronic | ICD-10-CM

## 2022-09-13 PROCEDURE — 78815 PET IMAGE W/CT SKULL-THIGH: CPT | Mod: 26,PI

## 2022-09-13 PROCEDURE — 78815 PET IMAGE W/CT SKULL-THIGH: CPT

## 2022-09-13 PROCEDURE — A9552: CPT

## 2022-09-14 NOTE — ASU PATIENT PROFILE, ADULT - FALL HARM RISK - UNIVERSAL INTERVENTIONS
Bed in lowest position, wheels locked, appropriate side rails in place/Call bell, personal items and telephone in reach/Instruct patient to call for assistance before getting out of bed or chair/Non-slip footwear when patient is out of bed/[place] to call system/Physically safe environment - no spills, clutter or unnecessary equipment/Purposeful Proactive Rounding/Room/bathroom lighting operational, light cord in reach

## 2022-09-15 ENCOUNTER — RESULT REVIEW (OUTPATIENT)
Age: 57
End: 2022-09-15

## 2022-09-15 ENCOUNTER — OUTPATIENT (OUTPATIENT)
Dept: OUTPATIENT SERVICES | Facility: HOSPITAL | Age: 57
LOS: 1 days | Discharge: HOME | End: 2022-09-15
Payer: COMMERCIAL

## 2022-09-15 ENCOUNTER — TRANSCRIPTION ENCOUNTER (OUTPATIENT)
Age: 57
End: 2022-09-15

## 2022-09-15 VITALS
HEIGHT: 68 IN | TEMPERATURE: 97 F | DIASTOLIC BLOOD PRESSURE: 80 MMHG | HEART RATE: 80 BPM | OXYGEN SATURATION: 97 % | WEIGHT: 235.01 LBS | RESPIRATION RATE: 16 BRPM | SYSTOLIC BLOOD PRESSURE: 145 MMHG

## 2022-09-15 VITALS — DIASTOLIC BLOOD PRESSURE: 76 MMHG | SYSTOLIC BLOOD PRESSURE: 128 MMHG | HEART RATE: 80 BPM | OXYGEN SATURATION: 97 %

## 2022-09-15 DIAGNOSIS — Z98.890 OTHER SPECIFIED POSTPROCEDURAL STATES: Chronic | ICD-10-CM

## 2022-09-15 DIAGNOSIS — Z90.49 ACQUIRED ABSENCE OF OTHER SPECIFIED PARTS OF DIGESTIVE TRACT: Chronic | ICD-10-CM

## 2022-09-15 DIAGNOSIS — C18.4 MALIGNANT NEOPLASM OF TRANSVERSE COLON: ICD-10-CM

## 2022-09-15 PROCEDURE — C1788: CPT

## 2022-09-15 PROCEDURE — C1769: CPT

## 2022-09-15 PROCEDURE — 77001 FLUOROGUIDE FOR VEIN DEVICE: CPT

## 2022-09-15 PROCEDURE — 76937 US GUIDE VASCULAR ACCESS: CPT | Mod: 26

## 2022-09-15 PROCEDURE — 76937 US GUIDE VASCULAR ACCESS: CPT

## 2022-09-15 PROCEDURE — 36561 INSERT TUNNELED CV CATH: CPT

## 2022-09-15 PROCEDURE — 77001 FLUOROGUIDE FOR VEIN DEVICE: CPT | Mod: 26

## 2022-09-15 RX ORDER — FENTANYL CITRATE 50 UG/ML
50 INJECTION INTRAVENOUS ONCE
Refills: 0 | Status: DISCONTINUED | OUTPATIENT
Start: 2022-09-15 | End: 2022-09-15

## 2022-09-15 RX ORDER — ONDANSETRON 8 MG/1
4 TABLET, FILM COATED ORAL ONCE
Refills: 0 | Status: DISCONTINUED | OUTPATIENT
Start: 2022-09-15 | End: 2022-09-15

## 2022-09-15 RX ORDER — SODIUM CHLORIDE 9 MG/ML
1000 INJECTION, SOLUTION INTRAVENOUS
Refills: 0 | Status: DISCONTINUED | OUTPATIENT
Start: 2022-09-15 | End: 2022-09-15

## 2022-09-15 RX ORDER — ROSUVASTATIN CALCIUM 5 MG/1
1 TABLET ORAL
Qty: 0 | Refills: 0 | DISCHARGE

## 2022-09-15 RX ORDER — OXYCODONE HYDROCHLORIDE 5 MG/1
5 TABLET ORAL ONCE
Refills: 0 | Status: DISCONTINUED | OUTPATIENT
Start: 2022-09-15 | End: 2022-09-15

## 2022-09-19 ENCOUNTER — OUTPATIENT (OUTPATIENT)
Dept: OUTPATIENT SERVICES | Facility: HOSPITAL | Age: 57
LOS: 1 days | End: 2022-09-19
Payer: COMMERCIAL

## 2022-09-19 DIAGNOSIS — C18.9 MALIGNANT NEOPLASM OF COLON, UNSPECIFIED: ICD-10-CM

## 2022-09-19 DIAGNOSIS — Z90.49 ACQUIRED ABSENCE OF OTHER SPECIFIED PARTS OF DIGESTIVE TRACT: Chronic | ICD-10-CM

## 2022-09-19 DIAGNOSIS — Z98.890 OTHER SPECIFIED POSTPROCEDURAL STATES: Chronic | ICD-10-CM

## 2022-09-20 ENCOUNTER — APPOINTMENT (OUTPATIENT)
Age: 57
End: 2022-09-20

## 2022-09-20 VITALS
DIASTOLIC BLOOD PRESSURE: 91 MMHG | TEMPERATURE: 97.3 F | HEIGHT: 68 IN | HEART RATE: 78 BPM | BODY MASS INDEX: 35.46 KG/M2 | OXYGEN SATURATION: 96 % | WEIGHT: 234 LBS | SYSTOLIC BLOOD PRESSURE: 156 MMHG

## 2022-09-20 PROCEDURE — 99215 OFFICE O/P EST HI 40 MIN: CPT

## 2022-09-20 NOTE — REVIEW OF SYSTEMS
[Fatigue] : no fatigue [Recent Change In Weight] : ~T no recent weight change [Dysphagia] : no dysphagia [Odynophagia] : no odynophagia [Chest Pain] : no chest pain [Shortness Of Breath] : no shortness of breath [Diarrhea] : diarrhea [Joint Pain] : no joint pain [Joint Stiffness] : no joint stiffness [Skin Rash] : no skin rash [Insomnia] : insomnia [Anxiety] : anxiety [Easy Bleeding] : no tendency for easy bleeding [Easy Bruising] : no tendency for easy bruising [Swollen Glands] : no swollen glands

## 2022-09-20 NOTE — HISTORY OF PRESENT ILLNESS
[de-identified] : Mr. Mccarthy was found to have mild iron deficiency anemia of routine blood work at the end of 2020. Colonoscopy revealed a large tumor in the proximal transverse colon. He had no GI symptoms. A CT of the chest, abdomen and pelvis in January, 2021 revealed no evidence of metastatic disease.\par \par He underwent right hemicolectomy on January, 27, 2021 with pathology consistent with poorly differentiated adenocarcinoma measuring 2.5 cm, infiltrating into pericolonic tissue. There was lymphovascular invasion, with 0 of 14 lymph nodes involved with cancer ([id number], high-risk stage II). Proficient mismatch repair.\par \par He received adjuvant capecitabine x 6 months 3/4/21-end of August 2021 with Dr. Sumner, with minimal toxicity. His CEA was persistently elevated near 7, with multiple negative scans.\par \par ctDNA testing in January, 2022 was unrevealing.\par \par In August, 2022 CT scans showed several enlarging paraaortic lymph nodes (reference node 2.3 x 1.1cm, previously 1.4 x 0.5cm) and two (bilateral) subcentimeter lung nodules.\par \par An IR-guided biopsy of the aortocaval node shortly thereafter confirmed metastatic adenocarcinoma morphologically equivalent to his known colon primary. [de-identified] : Mr. Mccarthy has been back to Dr. Sumner, was offered FOLFOX + bevacizumab. He has also seen doctors from [facility], who offered him watchful waiting versus irinotecan. His friend, Norbert Borjas is my patient, and suggested he see me as third opinion.\par \par Kaushik reports no symptoms at present, eating well, no aches or pains, no weight loss. He has some existential dread that is keeping him up at night. Adult children are also having some difficulty processing an uncurable diagnosis.

## 2022-09-20 NOTE — RESULTS/DATA
[FreeTextEntry1] : Mr. Mccarthy is a pleasant 57 year-old man with recurrent, now stage IV colon cancer, here for second opinion.

## 2022-09-20 NOTE — PHYSICAL EXAM
[Fully active, able to carry on all pre-disease performance without restriction] : Status 0 - Fully active, able to carry on all pre-disease performance without restriction [Normal] : affect appropriate [de-identified] : anicteric [de-identified] : right chest wall mediport c/d/i

## 2022-09-21 DIAGNOSIS — Z45.2 ENCOUNTER FOR ADJUSTMENT AND MANAGEMENT OF VASCULAR ACCESS DEVICE: ICD-10-CM

## 2022-09-26 ENCOUNTER — APPOINTMENT (OUTPATIENT)
Dept: INFUSION THERAPY | Facility: CLINIC | Age: 57
End: 2022-09-26

## 2022-09-28 ENCOUNTER — APPOINTMENT (OUTPATIENT)
Dept: INFUSION THERAPY | Facility: CLINIC | Age: 57
End: 2022-09-28

## 2022-10-26 ENCOUNTER — APPOINTMENT (OUTPATIENT)
Dept: CT IMAGING | Facility: CLINIC | Age: 57
End: 2022-10-26

## 2022-10-26 ENCOUNTER — OUTPATIENT (OUTPATIENT)
Dept: OUTPATIENT SERVICES | Facility: HOSPITAL | Age: 57
LOS: 1 days | End: 2022-10-26
Payer: COMMERCIAL

## 2022-10-26 ENCOUNTER — RESULT REVIEW (OUTPATIENT)
Age: 57
End: 2022-10-26

## 2022-10-26 DIAGNOSIS — Z98.890 OTHER SPECIFIED POSTPROCEDURAL STATES: Chronic | ICD-10-CM

## 2022-10-26 DIAGNOSIS — C18.4 MALIGNANT NEOPLASM OF TRANSVERSE COLON: ICD-10-CM

## 2022-10-26 DIAGNOSIS — Z00.8 ENCOUNTER FOR OTHER GENERAL EXAMINATION: ICD-10-CM

## 2022-10-26 DIAGNOSIS — C77.2 SECONDARY AND UNSPECIFIED MALIGNANT NEOPLASM OF INTRA-ABDOMINAL LYMPH NODES: ICD-10-CM

## 2022-10-26 DIAGNOSIS — Z90.49 ACQUIRED ABSENCE OF OTHER SPECIFIED PARTS OF DIGESTIVE TRACT: Chronic | ICD-10-CM

## 2022-10-26 PROCEDURE — 71260 CT THORAX DX C+: CPT | Mod: 26

## 2022-10-26 PROCEDURE — 74177 CT ABD & PELVIS W/CONTRAST: CPT

## 2022-10-26 PROCEDURE — 74177 CT ABD & PELVIS W/CONTRAST: CPT | Mod: 26

## 2022-10-26 PROCEDURE — 71260 CT THORAX DX C+: CPT

## 2022-11-01 ENCOUNTER — APPOINTMENT (OUTPATIENT)
Age: 57
End: 2022-11-01

## 2022-11-01 ENCOUNTER — RESULT REVIEW (OUTPATIENT)
Age: 57
End: 2022-11-01

## 2022-11-01 ENCOUNTER — NON-APPOINTMENT (OUTPATIENT)
Age: 57
End: 2022-11-01

## 2022-11-01 VITALS
BODY MASS INDEX: 35.77 KG/M2 | SYSTOLIC BLOOD PRESSURE: 158 MMHG | TEMPERATURE: 97.9 F | WEIGHT: 236 LBS | DIASTOLIC BLOOD PRESSURE: 90 MMHG | OXYGEN SATURATION: 97 % | HEART RATE: 72 BPM | HEIGHT: 68 IN

## 2022-11-01 LAB
ALBUMIN SERPL ELPH-MCNC: 3.9 G/DL — SIGNIFICANT CHANGE UP (ref 3.3–5)
ALP SERPL-CCNC: 62 U/L — SIGNIFICANT CHANGE UP (ref 40–120)
ALT FLD-CCNC: 42 U/L — SIGNIFICANT CHANGE UP (ref 10–45)
ANION GAP SERPL CALC-SCNC: 11 MMOL/L — SIGNIFICANT CHANGE UP (ref 5–17)
AST SERPL-CCNC: 38 U/L — SIGNIFICANT CHANGE UP (ref 10–40)
BASOPHILS # BLD AUTO: 0.03 K/UL — SIGNIFICANT CHANGE UP (ref 0–0.2)
BASOPHILS NFR BLD AUTO: 0.5 % — SIGNIFICANT CHANGE UP (ref 0–2)
BILIRUB SERPL-MCNC: 1 MG/DL — SIGNIFICANT CHANGE UP (ref 0.2–1.2)
BUN SERPL-MCNC: 10 MG/DL — SIGNIFICANT CHANGE UP (ref 7–23)
CALCIUM SERPL-MCNC: 9.9 MG/DL — SIGNIFICANT CHANGE UP (ref 8.4–10.5)
CEA SERPL-MCNC: 19.3 NG/ML — HIGH (ref 0–3.8)
CHLORIDE SERPL-SCNC: 100 MMOL/L — SIGNIFICANT CHANGE UP (ref 96–108)
CO2 SERPL-SCNC: 25 MMOL/L — SIGNIFICANT CHANGE UP (ref 22–31)
CREAT SERPL-MCNC: 0.63 MG/DL — SIGNIFICANT CHANGE UP (ref 0.5–1.3)
EGFR: 112 ML/MIN/1.73M2 — SIGNIFICANT CHANGE UP
EOSINOPHIL # BLD AUTO: 0.2 K/UL — SIGNIFICANT CHANGE UP (ref 0–0.5)
EOSINOPHIL NFR BLD AUTO: 3.1 % — SIGNIFICANT CHANGE UP (ref 0–6)
GLUCOSE SERPL-MCNC: 262 MG/DL — HIGH (ref 70–99)
HCT VFR BLD CALC: 45.6 % — SIGNIFICANT CHANGE UP (ref 39–50)
HGB BLD-MCNC: 16.5 G/DL — SIGNIFICANT CHANGE UP (ref 13–17)
IMM GRANULOCYTES NFR BLD AUTO: 0.3 % — SIGNIFICANT CHANGE UP (ref 0–0.9)
LYMPHOCYTES # BLD AUTO: 1.36 K/UL — SIGNIFICANT CHANGE UP (ref 1–3.3)
LYMPHOCYTES # BLD AUTO: 21 % — SIGNIFICANT CHANGE UP (ref 13–44)
MCHC RBC-ENTMCNC: 32 PG — SIGNIFICANT CHANGE UP (ref 27–34)
MCHC RBC-ENTMCNC: 36.2 GM/DL — HIGH (ref 32–36)
MCV RBC AUTO: 88.4 FL — SIGNIFICANT CHANGE UP (ref 80–100)
MONOCYTES # BLD AUTO: 0.46 K/UL — SIGNIFICANT CHANGE UP (ref 0–0.9)
MONOCYTES NFR BLD AUTO: 7.1 % — SIGNIFICANT CHANGE UP (ref 2–14)
NEUTROPHILS # BLD AUTO: 4.42 K/UL — SIGNIFICANT CHANGE UP (ref 1.8–7.4)
NEUTROPHILS NFR BLD AUTO: 68 % — SIGNIFICANT CHANGE UP (ref 43–77)
NRBC # BLD: 0 /100 WBCS — SIGNIFICANT CHANGE UP (ref 0–0)
PLATELET # BLD AUTO: 161 K/UL — SIGNIFICANT CHANGE UP (ref 150–400)
POTASSIUM SERPL-MCNC: 4.3 MMOL/L — SIGNIFICANT CHANGE UP (ref 3.5–5.3)
POTASSIUM SERPL-SCNC: 4.3 MMOL/L — SIGNIFICANT CHANGE UP (ref 3.5–5.3)
PROT SERPL-MCNC: 7 G/DL — SIGNIFICANT CHANGE UP (ref 6–8.3)
RBC # BLD: 5.16 M/UL — SIGNIFICANT CHANGE UP (ref 4.2–5.8)
RBC # FLD: 12.4 % — SIGNIFICANT CHANGE UP (ref 10.3–14.5)
SODIUM SERPL-SCNC: 136 MMOL/L — SIGNIFICANT CHANGE UP (ref 135–145)
WBC # BLD: 6.49 K/UL — SIGNIFICANT CHANGE UP (ref 3.8–10.5)
WBC # FLD AUTO: 6.49 K/UL — SIGNIFICANT CHANGE UP (ref 3.8–10.5)

## 2022-11-01 PROCEDURE — 80053 COMPREHEN METABOLIC PANEL: CPT

## 2022-11-01 PROCEDURE — 36591 DRAW BLOOD OFF VENOUS DEVICE: CPT

## 2022-11-01 PROCEDURE — 85027 COMPLETE CBC AUTOMATED: CPT

## 2022-11-01 PROCEDURE — 82378 CARCINOEMBRYONIC ANTIGEN: CPT

## 2022-11-01 PROCEDURE — 36415 COLL VENOUS BLD VENIPUNCTURE: CPT

## 2022-11-01 PROCEDURE — 99213 OFFICE O/P EST LOW 20 MIN: CPT

## 2022-11-07 NOTE — RESULTS/DATA
[FreeTextEntry1] : Mr. Mccarthy is a pleasant 57 year-old man with recurrent, now stage IV colon cancer, electing a watch and wait approach to treatment, here in follow-up.

## 2022-11-07 NOTE — REVIEW OF SYSTEMS
[Fatigue] : no fatigue [Recent Change In Weight] : ~T no recent weight change [Dysphagia] : no dysphagia [Odynophagia] : no odynophagia [Chest Pain] : no chest pain [Shortness Of Breath] : no shortness of breath [Joint Pain] : no joint pain [Joint Stiffness] : no joint stiffness [Skin Rash] : no skin rash [Easy Bleeding] : no tendency for easy bleeding [Easy Bruising] : no tendency for easy bruising [Swollen Glands] : no swollen glands

## 2022-11-07 NOTE — HISTORY OF PRESENT ILLNESS
[de-identified] : Mr. Mccarthy was found to have mild iron deficiency anemia of routine blood work at the end of 2020. Colonoscopy revealed a large tumor in the proximal transverse colon. He had no GI symptoms. A CT of the chest, abdomen and pelvis in January, 2021 revealed no evidence of metastatic disease.\par \par He underwent right hemicolectomy on January, 27, 2021 with pathology consistent with poorly differentiated adenocarcinoma measuring 2.5 cm, infiltrating into pericolonic tissue. There was lymphovascular invasion, with 0 of 14 lymph nodes involved with cancer ([id number], high-risk stage II). Proficient mismatch repair.\par \par He received adjuvant capecitabine x 6 months 3/4/21-end of August 2021 with Dr. Sumner, with minimal toxicity. His CEA was persistently elevated near 7, with multiple negative scans.\par \par ctDNA testing in January, 2022 was unrevealing.\par \par In August, 2022 CT scans showed several enlarging paraaortic lymph nodes (reference node 2.3 x 1.1cm, previously 1.4 x 0.5cm) and two (bilateral) subcentimeter lung nodules.\par \par An IR-guided biopsy of the aortocaval node shortly thereafter confirmed metastatic adenocarcinoma morphologically equivalent to his known colon primary.\par \par Mr. Mccarthy has been back to Dr. Sumner, was offered FOLFOX + bevacizumab. He has also seen doctors from [facility] [place], who offered him watchful waiting versus irinotecan. His friend, Norbert Brojas is my patient, and suggested he see me as third opinion. [de-identified] : Found to have stable aortocaval lymph nodes previously noted on the PET scan on CT imaging at the end of October, 2022.  There is note of interval development of patchy nodular opacities in the right upper and superior right lower lobe, concerning more for infectious or inflammatory origin over malignancy.  Subcentimeter nodules unchanged.  No new areas of disease.\par \par He denies any ongoing fevers or chills, no headache, no lumps or bumps, no weight loss, no bleeding or bruising.\par

## 2022-12-09 ENCOUNTER — OUTPATIENT (OUTPATIENT)
Dept: OUTPATIENT SERVICES | Facility: HOSPITAL | Age: 57
LOS: 1 days | End: 2022-12-09
Payer: COMMERCIAL

## 2022-12-09 DIAGNOSIS — Z98.890 OTHER SPECIFIED POSTPROCEDURAL STATES: Chronic | ICD-10-CM

## 2022-12-09 DIAGNOSIS — R11.2 NAUSEA WITH VOMITING, UNSPECIFIED: ICD-10-CM

## 2022-12-09 DIAGNOSIS — C34.90 MALIGNANT NEOPLASM OF UNSPECIFIED PART OF UNSPECIFIED BRONCHUS OR LUNG: ICD-10-CM

## 2022-12-09 DIAGNOSIS — C18.9 MALIGNANT NEOPLASM OF COLON, UNSPECIFIED: ICD-10-CM

## 2022-12-09 DIAGNOSIS — Z90.49 ACQUIRED ABSENCE OF OTHER SPECIFIED PARTS OF DIGESTIVE TRACT: Chronic | ICD-10-CM

## 2022-12-09 DIAGNOSIS — Z51.11 ENCOUNTER FOR ANTINEOPLASTIC CHEMOTHERAPY: ICD-10-CM

## 2022-12-14 ENCOUNTER — RESULT REVIEW (OUTPATIENT)
Age: 57
End: 2022-12-14

## 2022-12-14 ENCOUNTER — APPOINTMENT (OUTPATIENT)
Age: 57
End: 2022-12-14

## 2022-12-14 LAB
ALBUMIN SERPL ELPH-MCNC: 4.2 G/DL — SIGNIFICANT CHANGE UP (ref 3.3–5)
ALP SERPL-CCNC: 61 U/L — SIGNIFICANT CHANGE UP (ref 40–120)
ALT FLD-CCNC: 41 U/L — SIGNIFICANT CHANGE UP (ref 10–45)
ANION GAP SERPL CALC-SCNC: 11 MMOL/L — SIGNIFICANT CHANGE UP (ref 5–17)
AST SERPL-CCNC: 36 U/L — SIGNIFICANT CHANGE UP (ref 10–40)
BASOPHILS # BLD AUTO: 0.04 K/UL — SIGNIFICANT CHANGE UP (ref 0–0.2)
BASOPHILS NFR BLD AUTO: 0.7 % — SIGNIFICANT CHANGE UP (ref 0–2)
BILIRUB SERPL-MCNC: 1.2 MG/DL — SIGNIFICANT CHANGE UP (ref 0.2–1.2)
BUN SERPL-MCNC: 11 MG/DL — SIGNIFICANT CHANGE UP (ref 7–23)
CALCIUM SERPL-MCNC: 9.6 MG/DL — SIGNIFICANT CHANGE UP (ref 8.4–10.5)
CEA SERPL-MCNC: 23.6 NG/ML — HIGH (ref 0–3.8)
CHLORIDE SERPL-SCNC: 101 MMOL/L — SIGNIFICANT CHANGE UP (ref 96–108)
CO2 SERPL-SCNC: 24 MMOL/L — SIGNIFICANT CHANGE UP (ref 22–31)
CREAT SERPL-MCNC: 0.6 MG/DL — SIGNIFICANT CHANGE UP (ref 0.5–1.3)
EGFR: 114 ML/MIN/1.73M2 — SIGNIFICANT CHANGE UP
EOSINOPHIL # BLD AUTO: 0.21 K/UL — SIGNIFICANT CHANGE UP (ref 0–0.5)
EOSINOPHIL NFR BLD AUTO: 3.8 % — SIGNIFICANT CHANGE UP (ref 0–6)
GLUCOSE SERPL-MCNC: 171 MG/DL — HIGH (ref 70–99)
HCT VFR BLD CALC: 47 % — SIGNIFICANT CHANGE UP (ref 39–50)
HGB BLD-MCNC: 16.8 G/DL — SIGNIFICANT CHANGE UP (ref 13–17)
IMM GRANULOCYTES NFR BLD AUTO: 0.2 % — SIGNIFICANT CHANGE UP (ref 0–0.9)
LYMPHOCYTES # BLD AUTO: 1.56 K/UL — SIGNIFICANT CHANGE UP (ref 1–3.3)
LYMPHOCYTES # BLD AUTO: 28.1 % — SIGNIFICANT CHANGE UP (ref 13–44)
MCHC RBC-ENTMCNC: 31.4 PG — SIGNIFICANT CHANGE UP (ref 27–34)
MCHC RBC-ENTMCNC: 35.7 GM/DL — SIGNIFICANT CHANGE UP (ref 32–36)
MCV RBC AUTO: 87.9 FL — SIGNIFICANT CHANGE UP (ref 80–100)
MONOCYTES # BLD AUTO: 0.44 K/UL — SIGNIFICANT CHANGE UP (ref 0–0.9)
MONOCYTES NFR BLD AUTO: 7.9 % — SIGNIFICANT CHANGE UP (ref 2–14)
NEUTROPHILS # BLD AUTO: 3.29 K/UL — SIGNIFICANT CHANGE UP (ref 1.8–7.4)
NEUTROPHILS NFR BLD AUTO: 59.3 % — SIGNIFICANT CHANGE UP (ref 43–77)
NRBC # BLD: 0 /100 WBCS — SIGNIFICANT CHANGE UP (ref 0–0)
PLATELET # BLD AUTO: 154 K/UL — SIGNIFICANT CHANGE UP (ref 150–400)
POTASSIUM SERPL-MCNC: 4.5 MMOL/L — SIGNIFICANT CHANGE UP (ref 3.5–5.3)
POTASSIUM SERPL-SCNC: 4.5 MMOL/L — SIGNIFICANT CHANGE UP (ref 3.5–5.3)
PROT SERPL-MCNC: 7 G/DL — SIGNIFICANT CHANGE UP (ref 6–8.3)
RBC # BLD: 5.35 M/UL — SIGNIFICANT CHANGE UP (ref 4.2–5.8)
RBC # FLD: 12 % — SIGNIFICANT CHANGE UP (ref 10.3–14.5)
SODIUM SERPL-SCNC: 136 MMOL/L — SIGNIFICANT CHANGE UP (ref 135–145)
WBC # BLD: 5.55 K/UL — SIGNIFICANT CHANGE UP (ref 3.8–10.5)
WBC # FLD AUTO: 5.55 K/UL — SIGNIFICANT CHANGE UP (ref 3.8–10.5)

## 2023-01-09 ENCOUNTER — NON-APPOINTMENT (OUTPATIENT)
Age: 58
End: 2023-01-09

## 2023-01-23 ENCOUNTER — RESULT REVIEW (OUTPATIENT)
Age: 58
End: 2023-01-23

## 2023-01-24 ENCOUNTER — OUTPATIENT (OUTPATIENT)
Dept: OUTPATIENT SERVICES | Facility: HOSPITAL | Age: 58
LOS: 1 days | End: 2023-01-24
Payer: COMMERCIAL

## 2023-01-24 ENCOUNTER — APPOINTMENT (OUTPATIENT)
Dept: CT IMAGING | Facility: CLINIC | Age: 58
End: 2023-01-24
Payer: COMMERCIAL

## 2023-01-24 DIAGNOSIS — Z98.890 OTHER SPECIFIED POSTPROCEDURAL STATES: Chronic | ICD-10-CM

## 2023-01-24 DIAGNOSIS — C18.4 MALIGNANT NEOPLASM OF TRANSVERSE COLON: ICD-10-CM

## 2023-01-24 DIAGNOSIS — Z90.49 ACQUIRED ABSENCE OF OTHER SPECIFIED PARTS OF DIGESTIVE TRACT: Chronic | ICD-10-CM

## 2023-01-24 DIAGNOSIS — Z00.8 ENCOUNTER FOR OTHER GENERAL EXAMINATION: ICD-10-CM

## 2023-01-24 PROCEDURE — 74177 CT ABD & PELVIS W/CONTRAST: CPT | Mod: 26

## 2023-01-24 PROCEDURE — 71260 CT THORAX DX C+: CPT

## 2023-01-24 PROCEDURE — 71260 CT THORAX DX C+: CPT | Mod: 26

## 2023-01-24 PROCEDURE — 74177 CT ABD & PELVIS W/CONTRAST: CPT

## 2023-01-25 ENCOUNTER — TRANSCRIPTION ENCOUNTER (OUTPATIENT)
Age: 58
End: 2023-01-25

## 2023-02-01 ENCOUNTER — APPOINTMENT (OUTPATIENT)
Age: 58
End: 2023-02-01

## 2023-02-01 ENCOUNTER — APPOINTMENT (OUTPATIENT)
Age: 58
End: 2023-02-01
Payer: COMMERCIAL

## 2023-02-01 ENCOUNTER — RESULT REVIEW (OUTPATIENT)
Age: 58
End: 2023-02-01

## 2023-02-01 ENCOUNTER — NON-APPOINTMENT (OUTPATIENT)
Age: 58
End: 2023-02-01

## 2023-02-01 VITALS
BODY MASS INDEX: 35.77 KG/M2 | HEIGHT: 68 IN | TEMPERATURE: 98.3 F | WEIGHT: 236 LBS | OXYGEN SATURATION: 97 % | HEART RATE: 78 BPM | SYSTOLIC BLOOD PRESSURE: 152 MMHG | DIASTOLIC BLOOD PRESSURE: 91 MMHG

## 2023-02-01 LAB
ALBUMIN SERPL ELPH-MCNC: 4.2 G/DL — SIGNIFICANT CHANGE UP (ref 3.3–5)
ALP SERPL-CCNC: 58 U/L — SIGNIFICANT CHANGE UP (ref 40–120)
ALT FLD-CCNC: 50 U/L — HIGH (ref 10–45)
ANION GAP SERPL CALC-SCNC: 13 MMOL/L — SIGNIFICANT CHANGE UP (ref 5–17)
AST SERPL-CCNC: 34 U/L — SIGNIFICANT CHANGE UP (ref 10–40)
BASOPHILS # BLD AUTO: 0.05 K/UL — SIGNIFICANT CHANGE UP (ref 0–0.2)
BASOPHILS NFR BLD AUTO: 0.8 % — SIGNIFICANT CHANGE UP (ref 0–2)
BILIRUB SERPL-MCNC: 1 MG/DL — SIGNIFICANT CHANGE UP (ref 0.2–1.2)
BUN SERPL-MCNC: 12 MG/DL — SIGNIFICANT CHANGE UP (ref 7–23)
CALCIUM SERPL-MCNC: 9.7 MG/DL — SIGNIFICANT CHANGE UP (ref 8.4–10.5)
CEA SERPL-MCNC: 29 NG/ML — HIGH (ref 0–3.8)
CHLORIDE SERPL-SCNC: 102 MMOL/L — SIGNIFICANT CHANGE UP (ref 96–108)
CO2 SERPL-SCNC: 23 MMOL/L — SIGNIFICANT CHANGE UP (ref 22–31)
CREAT SERPL-MCNC: 0.65 MG/DL — SIGNIFICANT CHANGE UP (ref 0.5–1.3)
EGFR: 111 ML/MIN/1.73M2 — SIGNIFICANT CHANGE UP
EOSINOPHIL # BLD AUTO: 0.3 K/UL — SIGNIFICANT CHANGE UP (ref 0–0.5)
EOSINOPHIL NFR BLD AUTO: 4.8 % — SIGNIFICANT CHANGE UP (ref 0–6)
GLUCOSE SERPL-MCNC: 223 MG/DL — HIGH (ref 70–99)
HCT VFR BLD CALC: 46.5 % — SIGNIFICANT CHANGE UP (ref 39–50)
HGB BLD-MCNC: 16.3 G/DL — SIGNIFICANT CHANGE UP (ref 13–17)
IMM GRANULOCYTES NFR BLD AUTO: 0.3 % — SIGNIFICANT CHANGE UP (ref 0–0.9)
LYMPHOCYTES # BLD AUTO: 1.47 K/UL — SIGNIFICANT CHANGE UP (ref 1–3.3)
LYMPHOCYTES # BLD AUTO: 23.6 % — SIGNIFICANT CHANGE UP (ref 13–44)
MCHC RBC-ENTMCNC: 30.8 PG — SIGNIFICANT CHANGE UP (ref 27–34)
MCHC RBC-ENTMCNC: 35.1 GM/DL — SIGNIFICANT CHANGE UP (ref 32–36)
MCV RBC AUTO: 87.7 FL — SIGNIFICANT CHANGE UP (ref 80–100)
MONOCYTES # BLD AUTO: 0.47 K/UL — SIGNIFICANT CHANGE UP (ref 0–0.9)
MONOCYTES NFR BLD AUTO: 7.5 % — SIGNIFICANT CHANGE UP (ref 2–14)
NEUTROPHILS # BLD AUTO: 3.92 K/UL — SIGNIFICANT CHANGE UP (ref 1.8–7.4)
NEUTROPHILS NFR BLD AUTO: 63 % — SIGNIFICANT CHANGE UP (ref 43–77)
NRBC # BLD: 0 /100 WBCS — SIGNIFICANT CHANGE UP (ref 0–0)
PLATELET # BLD AUTO: 147 K/UL — LOW (ref 150–400)
POTASSIUM SERPL-MCNC: 4.3 MMOL/L — SIGNIFICANT CHANGE UP (ref 3.5–5.3)
POTASSIUM SERPL-SCNC: 4.3 MMOL/L — SIGNIFICANT CHANGE UP (ref 3.5–5.3)
PROT SERPL-MCNC: 6.9 G/DL — SIGNIFICANT CHANGE UP (ref 6–8.3)
RBC # BLD: 5.3 M/UL — SIGNIFICANT CHANGE UP (ref 4.2–5.8)
RBC # FLD: 12 % — SIGNIFICANT CHANGE UP (ref 10.3–14.5)
SODIUM SERPL-SCNC: 138 MMOL/L — SIGNIFICANT CHANGE UP (ref 135–145)
WBC # BLD: 6.23 K/UL — SIGNIFICANT CHANGE UP (ref 3.8–10.5)
WBC # FLD AUTO: 6.23 K/UL — SIGNIFICANT CHANGE UP (ref 3.8–10.5)

## 2023-02-01 PROCEDURE — 99215 OFFICE O/P EST HI 40 MIN: CPT

## 2023-02-01 NOTE — HISTORY OF PRESENT ILLNESS
[de-identified] : Mr. Mccarthy was found to have mild iron deficiency anemia of routine blood work at the end of 2020. Colonoscopy revealed a large tumor in the proximal transverse colon. He had no GI symptoms. A CT of the chest, abdomen and pelvis in January, 2021 revealed no evidence of metastatic disease.\par \par He underwent right hemicolectomy on January, 27, 2021 with pathology consistent with poorly differentiated adenocarcinoma measuring 2.5 cm, infiltrating into pericolonic tissue. There was lymphovascular invasion, with 0 of 14 lymph nodes involved with cancer ([id number], high-risk stage II). Proficient mismatch repair.\par \par He received adjuvant capecitabine x 6 months 3/4/21-end of August 2021 with Dr. Sumner, with minimal toxicity. His CEA was persistently elevated near 7, with multiple negative scans.\par \par ctDNA testing in January, 2022 was unrevealing.\par \par In August, 2022 CT scans showed several enlarging paraaortic lymph nodes (reference node 2.3 x 1.1cm, previously 1.4 x 0.5cm) and two (bilateral) subcentimeter lung nodules.\par \par An IR-guided biopsy of the aortocaval node shortly thereafter confirmed metastatic adenocarcinoma morphologically equivalent to his known colon primary.\par \par Mr. Mccarthy has been back to Dr. Sumner, was offered FOLFOX + bevacizumab. [place] [facility] [place] watchful waiting versus irinotecan. His friend, Norbert Borjas is my patient, and suggested he see me as third opinion.\par \par Found to have stable aortocaval lymph nodes previously noted on the PET scan on CT imaging at the end of October, 2022.   [de-identified] : CT scan completed January, 2023 shows increase in size and number of lung metastases. New liver lesions suspicious for metastatic disease. Increase in size of a periceliac lymph node. Other retroperitoneal lymph nodes are stable.\par \par He denies any ongoing fevers or chills, no headache, no lumps or bumps, no weight loss, no bleeding or bruising. Has an occasional ache on his left side, responds to ativan at bedtime. No osseous lesions on CT imaging.\par \par \par \par

## 2023-02-01 NOTE — REVIEW OF SYSTEMS
[Diarrhea] : diarrhea [Insomnia] : insomnia [Anxiety] : anxiety [Fatigue] : no fatigue [Recent Change In Weight] : ~T no recent weight change [Dysphagia] : no dysphagia [Odynophagia] : no odynophagia [Chest Pain] : no chest pain [Shortness Of Breath] : no shortness of breath [Joint Pain] : no joint pain [Joint Stiffness] : no joint stiffness [Skin Rash] : no skin rash [Easy Bleeding] : no tendency for easy bleeding [Easy Bruising] : no tendency for easy bruising [Swollen Glands] : no swollen glands

## 2023-02-07 ENCOUNTER — RESULT REVIEW (OUTPATIENT)
Age: 58
End: 2023-02-07

## 2023-02-07 ENCOUNTER — APPOINTMENT (OUTPATIENT)
Age: 58
End: 2023-02-07
Payer: COMMERCIAL

## 2023-02-07 ENCOUNTER — APPOINTMENT (OUTPATIENT)
Age: 58
End: 2023-02-07

## 2023-02-07 LAB
ALBUMIN SERPL ELPH-MCNC: 4.1 G/DL — SIGNIFICANT CHANGE UP (ref 3.3–5)
ALP SERPL-CCNC: 58 U/L — SIGNIFICANT CHANGE UP (ref 40–120)
ALT FLD-CCNC: 57 U/L — HIGH (ref 10–45)
ANION GAP SERPL CALC-SCNC: 13 MMOL/L — SIGNIFICANT CHANGE UP (ref 5–17)
APPEARANCE UR: ABNORMAL
AST SERPL-CCNC: 42 U/L — HIGH (ref 10–40)
BACTERIA # UR AUTO: NEGATIVE — SIGNIFICANT CHANGE UP
BASOPHILS # BLD AUTO: 0.05 K/UL — SIGNIFICANT CHANGE UP (ref 0–0.2)
BASOPHILS NFR BLD AUTO: 0.7 % — SIGNIFICANT CHANGE UP (ref 0–2)
BILIRUB SERPL-MCNC: 1.2 MG/DL — SIGNIFICANT CHANGE UP (ref 0.2–1.2)
BILIRUB UR-MCNC: NEGATIVE — SIGNIFICANT CHANGE UP
BUN SERPL-MCNC: 13 MG/DL — SIGNIFICANT CHANGE UP (ref 7–23)
CALCIUM SERPL-MCNC: 9.9 MG/DL — SIGNIFICANT CHANGE UP (ref 8.4–10.5)
CHLORIDE SERPL-SCNC: 100 MMOL/L — SIGNIFICANT CHANGE UP (ref 96–108)
CO2 SERPL-SCNC: 24 MMOL/L — SIGNIFICANT CHANGE UP (ref 22–31)
COLOR SPEC: YELLOW — SIGNIFICANT CHANGE UP
CREAT SERPL-MCNC: 0.65 MG/DL — SIGNIFICANT CHANGE UP (ref 0.5–1.3)
DIFF PNL FLD: NEGATIVE — SIGNIFICANT CHANGE UP
EGFR: 111 ML/MIN/1.73M2 — SIGNIFICANT CHANGE UP
EOSINOPHIL # BLD AUTO: 0.35 K/UL — SIGNIFICANT CHANGE UP (ref 0–0.5)
EOSINOPHIL NFR BLD AUTO: 4.6 % — SIGNIFICANT CHANGE UP (ref 0–6)
EPI CELLS # UR: 0 /HPF — SIGNIFICANT CHANGE UP (ref 0–5)
GLUCOSE SERPL-MCNC: 212 MG/DL — HIGH (ref 70–99)
GLUCOSE UR QL: ABNORMAL
HCT VFR BLD CALC: 46.4 % — SIGNIFICANT CHANGE UP (ref 39–50)
HGB BLD-MCNC: 16.5 G/DL — SIGNIFICANT CHANGE UP (ref 13–17)
HYALINE CASTS # UR AUTO: 0 /LPF — SIGNIFICANT CHANGE UP (ref 0–7)
IMM GRANULOCYTES NFR BLD AUTO: 0.3 % — SIGNIFICANT CHANGE UP (ref 0–0.9)
KETONES UR-MCNC: NEGATIVE — SIGNIFICANT CHANGE UP
LEUKOCYTE ESTERASE UR-ACNC: NEGATIVE — SIGNIFICANT CHANGE UP
LYMPHOCYTES # BLD AUTO: 1.53 K/UL — SIGNIFICANT CHANGE UP (ref 1–3.3)
LYMPHOCYTES # BLD AUTO: 20.1 % — SIGNIFICANT CHANGE UP (ref 13–44)
MAGNESIUM SERPL-MCNC: 2.2 MG/DL — SIGNIFICANT CHANGE UP (ref 1.6–2.6)
MCHC RBC-ENTMCNC: 31.4 PG — SIGNIFICANT CHANGE UP (ref 27–34)
MCHC RBC-ENTMCNC: 35.6 GM/DL — SIGNIFICANT CHANGE UP (ref 32–36)
MCV RBC AUTO: 88.4 FL — SIGNIFICANT CHANGE UP (ref 80–100)
MONOCYTES # BLD AUTO: 0.58 K/UL — SIGNIFICANT CHANGE UP (ref 0–0.9)
MONOCYTES NFR BLD AUTO: 7.6 % — SIGNIFICANT CHANGE UP (ref 2–14)
NEUTROPHILS # BLD AUTO: 5.07 K/UL — SIGNIFICANT CHANGE UP (ref 1.8–7.4)
NEUTROPHILS NFR BLD AUTO: 66.7 % — SIGNIFICANT CHANGE UP (ref 43–77)
NITRITE UR-MCNC: NEGATIVE — SIGNIFICANT CHANGE UP
NRBC # BLD: 0 /100 WBCS — SIGNIFICANT CHANGE UP (ref 0–0)
PH UR: 7 — SIGNIFICANT CHANGE UP (ref 5–8)
PHOSPHATE SERPL-MCNC: 3.4 MG/DL — SIGNIFICANT CHANGE UP (ref 2.5–4.5)
PLATELET # BLD AUTO: 161 K/UL — SIGNIFICANT CHANGE UP (ref 150–400)
POTASSIUM SERPL-MCNC: 4.5 MMOL/L — SIGNIFICANT CHANGE UP (ref 3.5–5.3)
POTASSIUM SERPL-SCNC: 4.5 MMOL/L — SIGNIFICANT CHANGE UP (ref 3.5–5.3)
PROT SERPL-MCNC: 6.7 G/DL — SIGNIFICANT CHANGE UP (ref 6–8.3)
PROT UR-MCNC: NEGATIVE — SIGNIFICANT CHANGE UP
RBC # BLD: 5.25 M/UL — SIGNIFICANT CHANGE UP (ref 4.2–5.8)
RBC # FLD: 12.2 % — SIGNIFICANT CHANGE UP (ref 10.3–14.5)
RBC CASTS # UR COMP ASSIST: 1 /HPF — SIGNIFICANT CHANGE UP (ref 0–4)
SODIUM SERPL-SCNC: 137 MMOL/L — SIGNIFICANT CHANGE UP (ref 135–145)
SP GR SPEC: 1.02 — SIGNIFICANT CHANGE UP (ref 1.01–1.02)
UROBILINOGEN FLD QL: SIGNIFICANT CHANGE UP
WBC # BLD: 7.6 K/UL — SIGNIFICANT CHANGE UP (ref 3.8–10.5)
WBC # FLD AUTO: 7.6 K/UL — SIGNIFICANT CHANGE UP (ref 3.8–10.5)
WBC UR QL: 0 /HPF — SIGNIFICANT CHANGE UP (ref 0–5)

## 2023-02-07 PROCEDURE — 36591 DRAW BLOOD OFF VENOUS DEVICE: CPT

## 2023-02-07 PROCEDURE — 96376 TX/PRO/DX INJ SAME DRUG ADON: CPT

## 2023-02-07 PROCEDURE — 82378 CARCINOEMBRYONIC ANTIGEN: CPT

## 2023-02-07 PROCEDURE — 85027 COMPLETE CBC AUTOMATED: CPT

## 2023-02-07 PROCEDURE — 96417 CHEMO IV INFUS EACH ADDL SEQ: CPT

## 2023-02-07 PROCEDURE — 81001 URINALYSIS AUTO W/SCOPE: CPT

## 2023-02-07 PROCEDURE — 96415 CHEMO IV INFUSION ADDL HR: CPT

## 2023-02-07 PROCEDURE — 80053 COMPREHEN METABOLIC PANEL: CPT

## 2023-02-07 PROCEDURE — 96413 CHEMO IV INFUSION 1 HR: CPT

## 2023-02-07 PROCEDURE — 83735 ASSAY OF MAGNESIUM: CPT

## 2023-02-07 PROCEDURE — 84100 ASSAY OF PHOSPHORUS: CPT

## 2023-02-08 ENCOUNTER — NON-APPOINTMENT (OUTPATIENT)
Age: 58
End: 2023-02-08

## 2023-02-08 LAB
HBV CORE IGG+IGM SER QL: NONREACTIVE
HBV SURFACE AB SER QL: NONREACTIVE
HBV SURFACE AB SERPL IA-ACNC: <3 MIU/ML
HBV SURFACE AG SER QL: NONREACTIVE
HCV AB SER QL: NONREACTIVE
HCV S/CO RATIO: 0.06 S/CO

## 2023-02-18 ENCOUNTER — OUTPATIENT (OUTPATIENT)
Dept: OUTPATIENT SERVICES | Facility: HOSPITAL | Age: 58
LOS: 1 days | End: 2023-02-18
Payer: COMMERCIAL

## 2023-02-18 DIAGNOSIS — C18.9 MALIGNANT NEOPLASM OF COLON, UNSPECIFIED: ICD-10-CM

## 2023-02-18 DIAGNOSIS — Z98.890 OTHER SPECIFIED POSTPROCEDURAL STATES: Chronic | ICD-10-CM

## 2023-02-18 DIAGNOSIS — Z51.11 ENCOUNTER FOR ANTINEOPLASTIC CHEMOTHERAPY: ICD-10-CM

## 2023-02-18 DIAGNOSIS — Z90.49 ACQUIRED ABSENCE OF OTHER SPECIFIED PARTS OF DIGESTIVE TRACT: Chronic | ICD-10-CM

## 2023-02-18 DIAGNOSIS — R11.2 NAUSEA WITH VOMITING, UNSPECIFIED: ICD-10-CM

## 2023-02-28 ENCOUNTER — NON-APPOINTMENT (OUTPATIENT)
Age: 58
End: 2023-02-28

## 2023-02-28 ENCOUNTER — RESULT REVIEW (OUTPATIENT)
Age: 58
End: 2023-02-28

## 2023-02-28 ENCOUNTER — APPOINTMENT (OUTPATIENT)
Age: 58
End: 2023-02-28
Payer: COMMERCIAL

## 2023-02-28 ENCOUNTER — APPOINTMENT (OUTPATIENT)
Age: 58
End: 2023-02-28

## 2023-02-28 LAB
ALBUMIN SERPL ELPH-MCNC: 4.3 G/DL — SIGNIFICANT CHANGE UP (ref 3.3–5)
ALP SERPL-CCNC: 60 U/L — SIGNIFICANT CHANGE UP (ref 40–120)
ALT FLD-CCNC: 51 U/L — HIGH (ref 10–45)
ANION GAP SERPL CALC-SCNC: 13 MMOL/L — SIGNIFICANT CHANGE UP (ref 5–17)
APPEARANCE UR: CLEAR — SIGNIFICANT CHANGE UP
AST SERPL-CCNC: 43 U/L — HIGH (ref 10–40)
BASOPHILS # BLD AUTO: 0.04 K/UL — SIGNIFICANT CHANGE UP (ref 0–0.2)
BASOPHILS NFR BLD AUTO: 0.7 % — SIGNIFICANT CHANGE UP (ref 0–2)
BILIRUB SERPL-MCNC: 1.3 MG/DL — HIGH (ref 0.2–1.2)
BILIRUB UR-MCNC: NEGATIVE — SIGNIFICANT CHANGE UP
BUN SERPL-MCNC: 13 MG/DL — SIGNIFICANT CHANGE UP (ref 7–23)
CALCIUM SERPL-MCNC: 9.6 MG/DL — SIGNIFICANT CHANGE UP (ref 8.4–10.5)
CHLORIDE SERPL-SCNC: 101 MMOL/L — SIGNIFICANT CHANGE UP (ref 96–108)
CO2 SERPL-SCNC: 23 MMOL/L — SIGNIFICANT CHANGE UP (ref 22–31)
COLOR SPEC: SIGNIFICANT CHANGE UP
CREAT SERPL-MCNC: 0.66 MG/DL — SIGNIFICANT CHANGE UP (ref 0.5–1.3)
DIFF PNL FLD: NEGATIVE — SIGNIFICANT CHANGE UP
EGFR: 111 ML/MIN/1.73M2 — SIGNIFICANT CHANGE UP
EOSINOPHIL # BLD AUTO: 0.11 K/UL — SIGNIFICANT CHANGE UP (ref 0–0.5)
EOSINOPHIL NFR BLD AUTO: 2 % — SIGNIFICANT CHANGE UP (ref 0–6)
GLUCOSE SERPL-MCNC: 181 MG/DL — HIGH (ref 70–99)
GLUCOSE UR QL: ABNORMAL
HCT VFR BLD CALC: 45.9 % — SIGNIFICANT CHANGE UP (ref 39–50)
HGB BLD-MCNC: 16.4 G/DL — SIGNIFICANT CHANGE UP (ref 13–17)
IMM GRANULOCYTES NFR BLD AUTO: 0.2 % — SIGNIFICANT CHANGE UP (ref 0–0.9)
KETONES UR-MCNC: NEGATIVE — SIGNIFICANT CHANGE UP
LEUKOCYTE ESTERASE UR-ACNC: NEGATIVE — SIGNIFICANT CHANGE UP
LYMPHOCYTES # BLD AUTO: 1.55 K/UL — SIGNIFICANT CHANGE UP (ref 1–3.3)
LYMPHOCYTES # BLD AUTO: 28.2 % — SIGNIFICANT CHANGE UP (ref 13–44)
MAGNESIUM SERPL-MCNC: 2 MG/DL — SIGNIFICANT CHANGE UP (ref 1.6–2.6)
MCHC RBC-ENTMCNC: 31.9 PG — SIGNIFICANT CHANGE UP (ref 27–34)
MCHC RBC-ENTMCNC: 35.7 GM/DL — SIGNIFICANT CHANGE UP (ref 32–36)
MCV RBC AUTO: 89.3 FL — SIGNIFICANT CHANGE UP (ref 80–100)
MONOCYTES # BLD AUTO: 0.65 K/UL — SIGNIFICANT CHANGE UP (ref 0–0.9)
MONOCYTES NFR BLD AUTO: 11.8 % — SIGNIFICANT CHANGE UP (ref 2–14)
NEUTROPHILS # BLD AUTO: 3.13 K/UL — SIGNIFICANT CHANGE UP (ref 1.8–7.4)
NEUTROPHILS NFR BLD AUTO: 57.1 % — SIGNIFICANT CHANGE UP (ref 43–77)
NITRITE UR-MCNC: NEGATIVE — SIGNIFICANT CHANGE UP
NRBC # BLD: 0 /100 WBCS — SIGNIFICANT CHANGE UP (ref 0–0)
PH UR: 6 — SIGNIFICANT CHANGE UP (ref 5–8)
PHOSPHATE SERPL-MCNC: 3.4 MG/DL — SIGNIFICANT CHANGE UP (ref 2.5–4.5)
PLATELET # BLD AUTO: 167 K/UL — SIGNIFICANT CHANGE UP (ref 150–400)
POTASSIUM SERPL-MCNC: 4.5 MMOL/L — SIGNIFICANT CHANGE UP (ref 3.5–5.3)
POTASSIUM SERPL-SCNC: 4.5 MMOL/L — SIGNIFICANT CHANGE UP (ref 3.5–5.3)
PROT SERPL-MCNC: 6.5 G/DL — SIGNIFICANT CHANGE UP (ref 6–8.3)
PROT UR-MCNC: NEGATIVE — SIGNIFICANT CHANGE UP
RBC # BLD: 5.14 M/UL — SIGNIFICANT CHANGE UP (ref 4.2–5.8)
RBC # FLD: 14.6 % — HIGH (ref 10.3–14.5)
SODIUM SERPL-SCNC: 137 MMOL/L — SIGNIFICANT CHANGE UP (ref 135–145)
SP GR SPEC: 1.02 — SIGNIFICANT CHANGE UP (ref 1.01–1.02)
UROBILINOGEN FLD QL: SIGNIFICANT CHANGE UP
WBC # BLD: 5.49 K/UL — SIGNIFICANT CHANGE UP (ref 3.8–10.5)
WBC # FLD AUTO: 5.49 K/UL — SIGNIFICANT CHANGE UP (ref 3.8–10.5)

## 2023-02-28 PROCEDURE — 99213 OFFICE O/P EST LOW 20 MIN: CPT

## 2023-03-02 NOTE — HISTORY OF PRESENT ILLNESS
[de-identified] : Mr. Mccarthy was found to have mild iron deficiency anemia of routine blood work at the end of 2020. Colonoscopy revealed a large tumor in the proximal transverse colon. He had no GI symptoms. A CT of the chest, abdomen and pelvis in January, 2021 revealed no evidence of metastatic disease.\par \par He underwent right hemicolectomy on January, 27, 2021 with pathology consistent with poorly differentiated adenocarcinoma measuring 2.5 cm, infiltrating into pericolonic tissue. There was lymphovascular invasion, with 0 of 14 lymph nodes involved with cancer ([id number], high-risk stage II). Proficient mismatch repair.\par \par He received adjuvant capecitabine x 6 months 3/4/21-end of August 2021 with Dr. Sumner, with minimal toxicity. His CEA was persistently elevated near 7, with multiple negative scans.\par \par ctDNA testing in January, 2022 was unrevealing.\par \par In August, 2022 CT scans showed several enlarging paraaortic lymph nodes (reference node 2.3 x 1.1cm, previously 1.4 x 0.5cm) and two (bilateral) subcentimeter lung nodules.\par \par An IR-guided biopsy of the aortocaval node shortly thereafter confirmed metastatic adenocarcinoma morphologically equivalent to his known colon primary.\par \par Mr. Mccarthy has been back to Dr. Sumner, was offered FOLFOX + bevacizumab. He has also seen doctors from [facility] [place], who offered him watchful waiting versus irinotecan. His friend, Norbert Lutherri is my patient, and suggested he see me as third opinion.\par \par Found to have stable aortocaval lymph nodes previously noted on the PET scan on CT imaging at the end of October, 2022.  \par \par CT scan completed January, 2023 showed increase in size and number of lung metastases. New liver lesions suspicious for metastatic disease. Increase in size of a periceliac lymph node. Other retroperitoneal lymph nodes are stable. Proceeded to XELOX + bevacizumab.\par  [de-identified] : Some cold dysesthesia with C1. No n/v/d or constipation, though took antiemetics a few days. BP WNL, no PPE. \par \par

## 2023-03-02 NOTE — PHYSICAL EXAM
[Fully active, able to carry on all pre-disease performance without restriction] : Status 0 - Fully active, able to carry on all pre-disease performance without restriction [Normal] : affect appropriate [de-identified] : anicteric [de-identified] : right chest wall mediport c/d/i

## 2023-03-02 NOTE — RESULTS/DATA
[FreeTextEntry1] : Mr. Mccarthy is a pleasant 57 year-old man with recurrent, now stage IV colon cancer, initially electing a watch and wait approach, now with progression, on XELOX + nicholas.

## 2023-03-20 ENCOUNTER — RESULT REVIEW (OUTPATIENT)
Age: 58
End: 2023-03-20

## 2023-03-20 ENCOUNTER — APPOINTMENT (OUTPATIENT)
Age: 58
End: 2023-03-20

## 2023-03-20 LAB
ALBUMIN SERPL ELPH-MCNC: 4.1 G/DL — SIGNIFICANT CHANGE UP (ref 3.3–5)
ALP SERPL-CCNC: 55 U/L — SIGNIFICANT CHANGE UP (ref 40–120)
ALT FLD-CCNC: 46 U/L — HIGH (ref 10–45)
ANION GAP SERPL CALC-SCNC: 12 MMOL/L — SIGNIFICANT CHANGE UP (ref 5–17)
APPEARANCE UR: CLEAR — SIGNIFICANT CHANGE UP
AST SERPL-CCNC: 49 U/L — HIGH (ref 10–40)
BASOPHILS # BLD AUTO: 0.02 K/UL — SIGNIFICANT CHANGE UP (ref 0–0.2)
BASOPHILS NFR BLD AUTO: 0.4 % — SIGNIFICANT CHANGE UP (ref 0–2)
BILIRUB SERPL-MCNC: 1.5 MG/DL — HIGH (ref 0.2–1.2)
BILIRUB UR-MCNC: NEGATIVE — SIGNIFICANT CHANGE UP
BUN SERPL-MCNC: 13 MG/DL — SIGNIFICANT CHANGE UP (ref 7–23)
CALCIUM SERPL-MCNC: 9.9 MG/DL — SIGNIFICANT CHANGE UP (ref 8.4–10.5)
CHLORIDE SERPL-SCNC: 101 MMOL/L — SIGNIFICANT CHANGE UP (ref 96–108)
CO2 SERPL-SCNC: 23 MMOL/L — SIGNIFICANT CHANGE UP (ref 22–31)
COLOR SPEC: SIGNIFICANT CHANGE UP
CREAT SERPL-MCNC: 0.73 MG/DL — SIGNIFICANT CHANGE UP (ref 0.5–1.3)
DIFF PNL FLD: NEGATIVE — SIGNIFICANT CHANGE UP
EGFR: 107 ML/MIN/1.73M2 — SIGNIFICANT CHANGE UP
EOSINOPHIL # BLD AUTO: 0.07 K/UL — SIGNIFICANT CHANGE UP (ref 0–0.5)
EOSINOPHIL NFR BLD AUTO: 1.6 % — SIGNIFICANT CHANGE UP (ref 0–6)
GLUCOSE SERPL-MCNC: 220 MG/DL — HIGH (ref 70–99)
GLUCOSE UR QL: ABNORMAL
HCT VFR BLD CALC: 43.3 % — SIGNIFICANT CHANGE UP (ref 39–50)
HGB BLD-MCNC: 15.7 G/DL — SIGNIFICANT CHANGE UP (ref 13–17)
IMM GRANULOCYTES NFR BLD AUTO: 0.4 % — SIGNIFICANT CHANGE UP (ref 0–0.9)
KETONES UR-MCNC: NEGATIVE — SIGNIFICANT CHANGE UP
LEUKOCYTE ESTERASE UR-ACNC: NEGATIVE — SIGNIFICANT CHANGE UP
LYMPHOCYTES # BLD AUTO: 1.48 K/UL — SIGNIFICANT CHANGE UP (ref 1–3.3)
LYMPHOCYTES # BLD AUTO: 33 % — SIGNIFICANT CHANGE UP (ref 13–44)
MAGNESIUM SERPL-MCNC: 1.9 MG/DL — SIGNIFICANT CHANGE UP (ref 1.6–2.6)
MCHC RBC-ENTMCNC: 32.6 PG — SIGNIFICANT CHANGE UP (ref 27–34)
MCHC RBC-ENTMCNC: 36.3 GM/DL — HIGH (ref 32–36)
MCV RBC AUTO: 90 FL — SIGNIFICANT CHANGE UP (ref 80–100)
MONOCYTES # BLD AUTO: 0.59 K/UL — SIGNIFICANT CHANGE UP (ref 0–0.9)
MONOCYTES NFR BLD AUTO: 13.1 % — SIGNIFICANT CHANGE UP (ref 2–14)
NEUTROPHILS # BLD AUTO: 2.31 K/UL — SIGNIFICANT CHANGE UP (ref 1.8–7.4)
NEUTROPHILS NFR BLD AUTO: 51.5 % — SIGNIFICANT CHANGE UP (ref 43–77)
NITRITE UR-MCNC: NEGATIVE — SIGNIFICANT CHANGE UP
NRBC # BLD: 0 /100 WBCS — SIGNIFICANT CHANGE UP (ref 0–0)
PH UR: 5.5 — SIGNIFICANT CHANGE UP (ref 5–8)
PHOSPHATE SERPL-MCNC: 4.2 MG/DL — SIGNIFICANT CHANGE UP (ref 2.5–4.5)
PLATELET # BLD AUTO: 150 K/UL — SIGNIFICANT CHANGE UP (ref 150–400)
POTASSIUM SERPL-MCNC: 4.5 MMOL/L — SIGNIFICANT CHANGE UP (ref 3.5–5.3)
POTASSIUM SERPL-SCNC: 4.5 MMOL/L — SIGNIFICANT CHANGE UP (ref 3.5–5.3)
PROT SERPL-MCNC: 6.3 G/DL — SIGNIFICANT CHANGE UP (ref 6–8.3)
PROT UR-MCNC: NEGATIVE — SIGNIFICANT CHANGE UP
RBC # BLD: 4.81 M/UL — SIGNIFICANT CHANGE UP (ref 4.2–5.8)
RBC # FLD: 16.9 % — HIGH (ref 10.3–14.5)
SODIUM SERPL-SCNC: 136 MMOL/L — SIGNIFICANT CHANGE UP (ref 135–145)
SP GR SPEC: 1.01 — SIGNIFICANT CHANGE UP (ref 1.01–1.02)
UROBILINOGEN FLD QL: SIGNIFICANT CHANGE UP
WBC # BLD: 4.49 K/UL — SIGNIFICANT CHANGE UP (ref 3.8–10.5)
WBC # FLD AUTO: 4.49 K/UL — SIGNIFICANT CHANGE UP (ref 3.8–10.5)

## 2023-04-10 PROBLEM — C77.2 METASTASIS TO RETROPERITONEAL LYMPH NODE: Status: ACTIVE | Noted: 2022-09-01

## 2023-04-11 ENCOUNTER — RESULT REVIEW (OUTPATIENT)
Age: 58
End: 2023-04-11

## 2023-04-11 ENCOUNTER — APPOINTMENT (OUTPATIENT)
Age: 58
End: 2023-04-11
Payer: COMMERCIAL

## 2023-04-11 ENCOUNTER — APPOINTMENT (OUTPATIENT)
Age: 58
End: 2023-04-11

## 2023-04-11 DIAGNOSIS — C77.2 SECONDARY AND UNSPECIFIED MALIGNANT NEOPLASM OF INTRA-ABDOMINAL LYMPH NODES: ICD-10-CM

## 2023-04-11 LAB
ALBUMIN SERPL ELPH-MCNC: 4.1 G/DL — SIGNIFICANT CHANGE UP (ref 3.3–5)
ALP SERPL-CCNC: 55 U/L — SIGNIFICANT CHANGE UP (ref 40–120)
ALT FLD-CCNC: 57 U/L — HIGH (ref 10–45)
ANION GAP SERPL CALC-SCNC: 15 MMOL/L — SIGNIFICANT CHANGE UP (ref 5–17)
APPEARANCE UR: CLEAR — SIGNIFICANT CHANGE UP
AST SERPL-CCNC: 65 U/L — HIGH (ref 10–40)
BASOPHILS # BLD AUTO: 0.03 K/UL — SIGNIFICANT CHANGE UP (ref 0–0.2)
BASOPHILS NFR BLD AUTO: 0.6 % — SIGNIFICANT CHANGE UP (ref 0–2)
BILIRUB SERPL-MCNC: 1.9 MG/DL — HIGH (ref 0.2–1.2)
BILIRUB UR-MCNC: NEGATIVE — SIGNIFICANT CHANGE UP
BUN SERPL-MCNC: 13 MG/DL — SIGNIFICANT CHANGE UP (ref 7–23)
CALCIUM SERPL-MCNC: 9.5 MG/DL — SIGNIFICANT CHANGE UP (ref 8.4–10.5)
CHLORIDE SERPL-SCNC: 100 MMOL/L — SIGNIFICANT CHANGE UP (ref 96–108)
CO2 SERPL-SCNC: 22 MMOL/L — SIGNIFICANT CHANGE UP (ref 22–31)
COLOR SPEC: YELLOW — SIGNIFICANT CHANGE UP
CREAT SERPL-MCNC: 0.71 MG/DL — SIGNIFICANT CHANGE UP (ref 0.5–1.3)
DIFF PNL FLD: NEGATIVE — SIGNIFICANT CHANGE UP
EGFR: 108 ML/MIN/1.73M2 — SIGNIFICANT CHANGE UP
EOSINOPHIL # BLD AUTO: 0.08 K/UL — SIGNIFICANT CHANGE UP (ref 0–0.5)
EOSINOPHIL NFR BLD AUTO: 1.5 % — SIGNIFICANT CHANGE UP (ref 0–6)
GLUCOSE SERPL-MCNC: 235 MG/DL — HIGH (ref 70–99)
GLUCOSE UR QL: ABNORMAL
HCT VFR BLD CALC: 43.5 % — SIGNIFICANT CHANGE UP (ref 39–50)
HGB BLD-MCNC: 15.8 G/DL — SIGNIFICANT CHANGE UP (ref 13–17)
IMM GRANULOCYTES NFR BLD AUTO: 0.2 % — SIGNIFICANT CHANGE UP (ref 0–0.9)
KETONES UR-MCNC: NEGATIVE — SIGNIFICANT CHANGE UP
LEUKOCYTE ESTERASE UR-ACNC: NEGATIVE — SIGNIFICANT CHANGE UP
LYMPHOCYTES # BLD AUTO: 1.43 K/UL — SIGNIFICANT CHANGE UP (ref 1–3.3)
LYMPHOCYTES # BLD AUTO: 26.8 % — SIGNIFICANT CHANGE UP (ref 13–44)
MAGNESIUM SERPL-MCNC: 1.8 MG/DL — SIGNIFICANT CHANGE UP (ref 1.6–2.6)
MCHC RBC-ENTMCNC: 34.2 PG — HIGH (ref 27–34)
MCHC RBC-ENTMCNC: 36.3 GM/DL — HIGH (ref 32–36)
MCV RBC AUTO: 94.2 FL — SIGNIFICANT CHANGE UP (ref 80–100)
MONOCYTES # BLD AUTO: 0.66 K/UL — SIGNIFICANT CHANGE UP (ref 0–0.9)
MONOCYTES NFR BLD AUTO: 12.4 % — SIGNIFICANT CHANGE UP (ref 2–14)
NEUTROPHILS # BLD AUTO: 3.12 K/UL — SIGNIFICANT CHANGE UP (ref 1.8–7.4)
NEUTROPHILS NFR BLD AUTO: 58.5 % — SIGNIFICANT CHANGE UP (ref 43–77)
NITRITE UR-MCNC: NEGATIVE — SIGNIFICANT CHANGE UP
NRBC # BLD: 0 /100 WBCS — SIGNIFICANT CHANGE UP (ref 0–0)
PH UR: 5.5 — SIGNIFICANT CHANGE UP (ref 5–8)
PHOSPHATE SERPL-MCNC: 4 MG/DL — SIGNIFICANT CHANGE UP (ref 2.5–4.5)
PLATELET # BLD AUTO: 143 K/UL — LOW (ref 150–400)
POTASSIUM SERPL-MCNC: 4.4 MMOL/L — SIGNIFICANT CHANGE UP (ref 3.5–5.3)
POTASSIUM SERPL-SCNC: 4.4 MMOL/L — SIGNIFICANT CHANGE UP (ref 3.5–5.3)
PROT SERPL-MCNC: 6.3 G/DL — SIGNIFICANT CHANGE UP (ref 6–8.3)
PROT UR-MCNC: NEGATIVE — SIGNIFICANT CHANGE UP
RBC # BLD: 4.62 M/UL — SIGNIFICANT CHANGE UP (ref 4.2–5.8)
RBC # FLD: 18 % — HIGH (ref 10.3–14.5)
SODIUM SERPL-SCNC: 137 MMOL/L — SIGNIFICANT CHANGE UP (ref 135–145)
SP GR SPEC: 1.02 — SIGNIFICANT CHANGE UP (ref 1.01–1.02)
UROBILINOGEN FLD QL: SIGNIFICANT CHANGE UP
WBC # BLD: 5.33 K/UL — SIGNIFICANT CHANGE UP (ref 3.8–10.5)
WBC # FLD AUTO: 5.33 K/UL — SIGNIFICANT CHANGE UP (ref 3.8–10.5)

## 2023-04-11 PROCEDURE — 80053 COMPREHEN METABOLIC PANEL: CPT

## 2023-04-11 PROCEDURE — 96413 CHEMO IV INFUSION 1 HR: CPT

## 2023-04-11 PROCEDURE — 81003 URINALYSIS AUTO W/O SCOPE: CPT

## 2023-04-11 PROCEDURE — 83735 ASSAY OF MAGNESIUM: CPT

## 2023-04-11 PROCEDURE — 96415 CHEMO IV INFUSION ADDL HR: CPT

## 2023-04-11 PROCEDURE — 96375 TX/PRO/DX INJ NEW DRUG ADDON: CPT

## 2023-04-11 PROCEDURE — 84100 ASSAY OF PHOSPHORUS: CPT

## 2023-04-11 PROCEDURE — 99214 OFFICE O/P EST MOD 30 MIN: CPT

## 2023-04-11 PROCEDURE — 85027 COMPLETE CBC AUTOMATED: CPT

## 2023-04-11 PROCEDURE — 96417 CHEMO IV INFUS EACH ADDL SEQ: CPT

## 2023-04-11 RX ORDER — LISINOPRIL 2.5 MG/1
1 TABLET ORAL
Qty: 0 | Refills: 0 | DISCHARGE

## 2023-04-11 NOTE — PHYSICAL EXAM
[Fully active, able to carry on all pre-disease performance without restriction] : Status 0 - Fully active, able to carry on all pre-disease performance without restriction [Normal] : affect appropriate [de-identified] : anicteric [de-identified] : right chest wall mediport c/d/i

## 2023-04-11 NOTE — REVIEW OF SYSTEMS
[Fatigue] : fatigue [Recent Change In Weight] : ~T no recent weight change [Dysphagia] : no dysphagia [Odynophagia] : no odynophagia [Chest Pain] : no chest pain [Shortness Of Breath] : no shortness of breath [Joint Pain] : no joint pain [Joint Stiffness] : no joint stiffness [Skin Rash] : no skin rash [Easy Bleeding] : no tendency for easy bleeding [Easy Bruising] : no tendency for easy bruising [Swollen Glands] : no swollen glands

## 2023-04-11 NOTE — END OF VISIT
Follow up with PCP or ENT within 24 hours for reevaluation or return to ER.    Regarding PAROTITIS, I explained that the condition is an inflammation in one or both of the parotid glands - which are the two large salivary glands inside each cheek over the jaw in front of each ear. I discussed the most common causes including: Bacterial infection; mumps; viral infections; blockage of saliva flow; and autoimmune diseases. I explained that the risk factors that may increase the chance of parotitis including: dehydration and/or malnutrition; recent surgery; radiation therapy for head and neck cancer; certain medical conditions (i.e., HIV infection, diabetes, alcohol use disorder, sarcoidosis, and Sjogren?s syndrome); blocked saliva flow, resulting from a salivary stone in the parotid gland, mucus plug in salivary duct, or a benign tumor; psychiatric conditions, such as depression or eating disorders; the use of certain medications, and poor oral hygiene.  For home treatment, I recommended that the patient maintain good oral hygiene (flossing once a day and thorough tooth brushing at least twice a day may help with healing), warm salt-water rinses can help keep the mouth moist, and refraining from smoking (if applicable). To help reduce the chances of parotitis, instructions were provided to get prompt treatment for any infections; see dentist for proper oral care as recommended; drink plenty of fluids throughout the day to avoid dehydration; and receive the measles, mumps, and rubella (MMR) vaccination if not vaccinated.  Treatment at home can include: anti-inflammatory medications, local heat, gentle massage of the gland from posterior to anterior, and hydration provide variable symptomatic relief.     [Time Spent: ___ minutes] : I have spent [unfilled] minutes of time on the encounter.

## 2023-04-11 NOTE — HISTORY OF PRESENT ILLNESS
[de-identified] : Mr. Mccarthy was found to have mild iron deficiency anemia of routine blood work at the end of 2020. Colonoscopy revealed a large tumor in the proximal transverse colon. He had no GI symptoms. A CT of the chest, abdomen and pelvis in January, 2021 revealed no evidence of metastatic disease.\par \par He underwent right hemicolectomy on January, 27, 2021 with pathology consistent with poorly differentiated adenocarcinoma measuring 2.5 cm, infiltrating into pericolonic tissue. There was lymphovascular invasion, with 0 of 14 lymph nodes involved with cancer ([id number], high-risk stage II). Proficient mismatch repair.\par \par He received adjuvant capecitabine x 6 months 3/4/21-end of August 2021 with Dr. Sumner, with minimal toxicity. His CEA was persistently elevated near 7, with multiple negative scans.\par \par ctDNA testing in January, 2022 was unrevealing.\par \par In August, 2022 CT scans showed several enlarging paraaortic lymph nodes (reference node 2.3 x 1.1cm, previously 1.4 x 0.5cm) and two (bilateral) subcentimeter lung nodules.\par \par An IR-guided biopsy of the aortocaval node shortly thereafter confirmed metastatic adenocarcinoma morphologically equivalent to his known colon primary.\par \par Mr. Mccarthy has been back to Dr. Sumner, was offered FOLFOX + bevacizumab. He has also seen doctors from [facility] [place], who offered him watchful waiting versus irinotecan. His friend, Norbert Lutherri is my patient, and suggested he see me as third opinion.\par \par Found to have stable aortocaval lymph nodes previously noted on the PET scan on CT imaging at the end of October, 2022.  \par \par CT scan completed January, 2023 showed increase in size and number of lung metastases. New liver lesions suspicious for metastatic disease. Increase in size of a periceliac lymph node. Other retroperitoneal lymph nodes are stable. Proceeded to XELOX + bevacizumab.\par  [de-identified] : Doing well overall. Cold dysesthesia in hands limited by iced glove use. No other dose-limiting toxicities. Traveled to [place] for a quick getaway.\par \par

## 2023-04-11 NOTE — RESULTS/DATA
[FreeTextEntry1] : Mr. Mccarthy is a pleasant 58 year-old man with stage IV colon cancer, initially electing a watch and wait approach, now with progression, on XELOX + nicholas.

## 2023-04-19 ENCOUNTER — RESULT REVIEW (OUTPATIENT)
Age: 58
End: 2023-04-19

## 2023-04-25 ENCOUNTER — OUTPATIENT (OUTPATIENT)
Dept: OUTPATIENT SERVICES | Facility: HOSPITAL | Age: 58
LOS: 1 days | End: 2023-04-25
Payer: COMMERCIAL

## 2023-04-25 ENCOUNTER — APPOINTMENT (OUTPATIENT)
Dept: CT IMAGING | Facility: CLINIC | Age: 58
End: 2023-04-25
Payer: COMMERCIAL

## 2023-04-25 DIAGNOSIS — Z51.11 ENCOUNTER FOR ANTINEOPLASTIC CHEMOTHERAPY: ICD-10-CM

## 2023-04-25 DIAGNOSIS — C18.9 MALIGNANT NEOPLASM OF COLON, UNSPECIFIED: ICD-10-CM

## 2023-04-25 DIAGNOSIS — Z98.890 OTHER SPECIFIED POSTPROCEDURAL STATES: Chronic | ICD-10-CM

## 2023-04-25 DIAGNOSIS — Z90.49 ACQUIRED ABSENCE OF OTHER SPECIFIED PARTS OF DIGESTIVE TRACT: Chronic | ICD-10-CM

## 2023-04-25 DIAGNOSIS — R11.2 NAUSEA WITH VOMITING, UNSPECIFIED: ICD-10-CM

## 2023-04-25 PROCEDURE — 71260 CT THORAX DX C+: CPT | Mod: 26

## 2023-04-25 PROCEDURE — 71260 CT THORAX DX C+: CPT

## 2023-04-25 PROCEDURE — 74177 CT ABD & PELVIS W/CONTRAST: CPT | Mod: 26

## 2023-04-25 PROCEDURE — 74177 CT ABD & PELVIS W/CONTRAST: CPT

## 2023-05-02 ENCOUNTER — RESULT REVIEW (OUTPATIENT)
Age: 58
End: 2023-05-02

## 2023-05-02 ENCOUNTER — APPOINTMENT (OUTPATIENT)
Age: 58
End: 2023-05-02

## 2023-05-02 LAB
ALBUMIN SERPL ELPH-MCNC: 4.1 G/DL — SIGNIFICANT CHANGE UP (ref 3.3–5)
ALP SERPL-CCNC: 57 U/L — SIGNIFICANT CHANGE UP (ref 40–120)
ALT FLD-CCNC: 47 U/L — HIGH (ref 10–45)
ANION GAP SERPL CALC-SCNC: 12 MMOL/L — SIGNIFICANT CHANGE UP (ref 5–17)
APPEARANCE UR: CLEAR — SIGNIFICANT CHANGE UP
AST SERPL-CCNC: 48 U/L — HIGH (ref 10–40)
BASOPHILS # BLD AUTO: 0.03 K/UL — SIGNIFICANT CHANGE UP (ref 0–0.2)
BASOPHILS NFR BLD AUTO: 0.8 % — SIGNIFICANT CHANGE UP (ref 0–2)
BILIRUB SERPL-MCNC: 1.2 MG/DL — SIGNIFICANT CHANGE UP (ref 0.2–1.2)
BILIRUB UR-MCNC: NEGATIVE — SIGNIFICANT CHANGE UP
BUN SERPL-MCNC: 14 MG/DL — SIGNIFICANT CHANGE UP (ref 7–23)
CALCIUM SERPL-MCNC: 9.7 MG/DL — SIGNIFICANT CHANGE UP (ref 8.4–10.5)
CHLORIDE SERPL-SCNC: 101 MMOL/L — SIGNIFICANT CHANGE UP (ref 96–108)
CO2 SERPL-SCNC: 22 MMOL/L — SIGNIFICANT CHANGE UP (ref 22–31)
COLOR SPEC: YELLOW — SIGNIFICANT CHANGE UP
CREAT SERPL-MCNC: 0.65 MG/DL — SIGNIFICANT CHANGE UP (ref 0.5–1.3)
DIFF PNL FLD: NEGATIVE — SIGNIFICANT CHANGE UP
EGFR: 111 ML/MIN/1.73M2 — SIGNIFICANT CHANGE UP
EOSINOPHIL # BLD AUTO: 0.12 K/UL — SIGNIFICANT CHANGE UP (ref 0–0.5)
EOSINOPHIL NFR BLD AUTO: 3 % — SIGNIFICANT CHANGE UP (ref 0–6)
GLUCOSE SERPL-MCNC: 240 MG/DL — HIGH (ref 70–99)
GLUCOSE UR QL: >=1000 MG/DL
HCT VFR BLD CALC: 40.6 % — SIGNIFICANT CHANGE UP (ref 39–50)
HGB BLD-MCNC: 14.8 G/DL — SIGNIFICANT CHANGE UP (ref 13–17)
IMM GRANULOCYTES NFR BLD AUTO: 0.3 % — SIGNIFICANT CHANGE UP (ref 0–0.9)
KETONES UR-MCNC: NEGATIVE MG/DL — SIGNIFICANT CHANGE UP
LEUKOCYTE ESTERASE UR-ACNC: NEGATIVE — SIGNIFICANT CHANGE UP
LYMPHOCYTES # BLD AUTO: 1.36 K/UL — SIGNIFICANT CHANGE UP (ref 1–3.3)
LYMPHOCYTES # BLD AUTO: 34.2 % — SIGNIFICANT CHANGE UP (ref 13–44)
MAGNESIUM SERPL-MCNC: 1.7 MG/DL — SIGNIFICANT CHANGE UP (ref 1.6–2.6)
MCHC RBC-ENTMCNC: 34.7 PG — HIGH (ref 27–34)
MCHC RBC-ENTMCNC: 36.5 GM/DL — HIGH (ref 32–36)
MCV RBC AUTO: 95.3 FL — SIGNIFICANT CHANGE UP (ref 80–100)
MONOCYTES # BLD AUTO: 0.59 K/UL — SIGNIFICANT CHANGE UP (ref 0–0.9)
MONOCYTES NFR BLD AUTO: 14.8 % — HIGH (ref 2–14)
NEUTROPHILS # BLD AUTO: 1.87 K/UL — SIGNIFICANT CHANGE UP (ref 1.8–7.4)
NEUTROPHILS NFR BLD AUTO: 46.9 % — SIGNIFICANT CHANGE UP (ref 43–77)
NITRITE UR-MCNC: NEGATIVE — SIGNIFICANT CHANGE UP
NRBC # BLD: 0 /100 WBCS — SIGNIFICANT CHANGE UP (ref 0–0)
PH UR: 5.5 — SIGNIFICANT CHANGE UP (ref 5–8)
PHOSPHATE SERPL-MCNC: 3.1 MG/DL — SIGNIFICANT CHANGE UP (ref 2.5–4.5)
PLATELET # BLD AUTO: 133 K/UL — LOW (ref 150–400)
POTASSIUM SERPL-MCNC: 4.3 MMOL/L — SIGNIFICANT CHANGE UP (ref 3.5–5.3)
POTASSIUM SERPL-SCNC: 4.3 MMOL/L — SIGNIFICANT CHANGE UP (ref 3.5–5.3)
PROT SERPL-MCNC: 6.4 G/DL — SIGNIFICANT CHANGE UP (ref 6–8.3)
PROT UR-MCNC: NEGATIVE MG/DL — SIGNIFICANT CHANGE UP
RBC # BLD: 4.26 M/UL — SIGNIFICANT CHANGE UP (ref 4.2–5.8)
RBC # FLD: 17 % — HIGH (ref 10.3–14.5)
SODIUM SERPL-SCNC: 135 MMOL/L — SIGNIFICANT CHANGE UP (ref 135–145)
SP GR SPEC: 1.03 — SIGNIFICANT CHANGE UP (ref 1–1.03)
UROBILINOGEN FLD QL: 0.2 MG/DL — SIGNIFICANT CHANGE UP (ref 0.2–1)
WBC # BLD: 3.98 K/UL — SIGNIFICANT CHANGE UP (ref 3.8–10.5)
WBC # FLD AUTO: 3.98 K/UL — SIGNIFICANT CHANGE UP (ref 3.8–10.5)

## 2023-05-23 ENCOUNTER — RESULT REVIEW (OUTPATIENT)
Age: 58
End: 2023-05-23

## 2023-05-23 ENCOUNTER — APPOINTMENT (OUTPATIENT)
Age: 58
End: 2023-05-23
Payer: COMMERCIAL

## 2023-05-23 ENCOUNTER — APPOINTMENT (OUTPATIENT)
Age: 58
End: 2023-05-23

## 2023-05-23 VITALS
DIASTOLIC BLOOD PRESSURE: 77 MMHG | OXYGEN SATURATION: 97 % | BODY MASS INDEX: 34.83 KG/M2 | TEMPERATURE: 98.4 F | RESPIRATION RATE: 17 BRPM | WEIGHT: 229.06 LBS | SYSTOLIC BLOOD PRESSURE: 117 MMHG | HEART RATE: 82 BPM

## 2023-05-23 LAB
ALBUMIN SERPL ELPH-MCNC: 3.8 G/DL — SIGNIFICANT CHANGE UP (ref 3.3–5)
ALP SERPL-CCNC: 49 U/L — SIGNIFICANT CHANGE UP (ref 40–120)
ALT FLD-CCNC: 38 U/L — SIGNIFICANT CHANGE UP (ref 10–45)
ANION GAP SERPL CALC-SCNC: 12 MMOL/L — SIGNIFICANT CHANGE UP (ref 5–17)
AST SERPL-CCNC: 49 U/L — HIGH (ref 10–40)
BASOPHILS # BLD AUTO: 0.02 K/UL — SIGNIFICANT CHANGE UP (ref 0–0.2)
BASOPHILS NFR BLD AUTO: 0.6 % — SIGNIFICANT CHANGE UP (ref 0–2)
BILIRUB SERPL-MCNC: 1.6 MG/DL — HIGH (ref 0.2–1.2)
BUN SERPL-MCNC: 13 MG/DL — SIGNIFICANT CHANGE UP (ref 7–23)
CALCIUM SERPL-MCNC: 9.6 MG/DL — SIGNIFICANT CHANGE UP (ref 8.4–10.5)
CHLORIDE SERPL-SCNC: 102 MMOL/L — SIGNIFICANT CHANGE UP (ref 96–108)
CO2 SERPL-SCNC: 24 MMOL/L — SIGNIFICANT CHANGE UP (ref 22–31)
CREAT SERPL-MCNC: 0.66 MG/DL — SIGNIFICANT CHANGE UP (ref 0.5–1.3)
EGFR: 110 ML/MIN/1.73M2 — SIGNIFICANT CHANGE UP
EOSINOPHIL # BLD AUTO: 0.13 K/UL — SIGNIFICANT CHANGE UP (ref 0–0.5)
EOSINOPHIL NFR BLD AUTO: 3.9 % — SIGNIFICANT CHANGE UP (ref 0–6)
GLUCOSE SERPL-MCNC: 338 MG/DL — HIGH (ref 70–99)
HCT VFR BLD CALC: 38.8 % — LOW (ref 39–50)
HGB BLD-MCNC: 13.6 G/DL — SIGNIFICANT CHANGE UP (ref 13–17)
IMM GRANULOCYTES NFR BLD AUTO: 0.6 % — SIGNIFICANT CHANGE UP (ref 0–0.9)
LYMPHOCYTES # BLD AUTO: 1.05 K/UL — SIGNIFICANT CHANGE UP (ref 1–3.3)
LYMPHOCYTES # BLD AUTO: 31.7 % — SIGNIFICANT CHANGE UP (ref 13–44)
MAGNESIUM SERPL-MCNC: 1.8 MG/DL — SIGNIFICANT CHANGE UP (ref 1.6–2.6)
MCHC RBC-ENTMCNC: 34.8 PG — HIGH (ref 27–34)
MCHC RBC-ENTMCNC: 35.1 GM/DL — SIGNIFICANT CHANGE UP (ref 32–36)
MCV RBC AUTO: 99.2 FL — SIGNIFICANT CHANGE UP (ref 80–100)
MONOCYTES # BLD AUTO: 0.49 K/UL — SIGNIFICANT CHANGE UP (ref 0–0.9)
MONOCYTES NFR BLD AUTO: 14.8 % — HIGH (ref 2–14)
NEUTROPHILS # BLD AUTO: 1.6 K/UL — LOW (ref 1.8–7.4)
NEUTROPHILS NFR BLD AUTO: 48.4 % — SIGNIFICANT CHANGE UP (ref 43–77)
NRBC # BLD: 0 /100 WBCS — SIGNIFICANT CHANGE UP (ref 0–0)
PHOSPHATE SERPL-MCNC: 3.9 MG/DL — SIGNIFICANT CHANGE UP (ref 2.5–4.5)
PLATELET # BLD AUTO: 115 K/UL — LOW (ref 150–400)
POTASSIUM SERPL-MCNC: 4.2 MMOL/L — SIGNIFICANT CHANGE UP (ref 3.5–5.3)
POTASSIUM SERPL-SCNC: 4.2 MMOL/L — SIGNIFICANT CHANGE UP (ref 3.5–5.3)
PROT SERPL-MCNC: 5.9 G/DL — LOW (ref 6–8.3)
RBC # BLD: 3.91 M/UL — LOW (ref 4.2–5.8)
RBC # FLD: 17.2 % — HIGH (ref 10.3–14.5)
SODIUM SERPL-SCNC: 138 MMOL/L — SIGNIFICANT CHANGE UP (ref 135–145)
WBC # BLD: 3.31 K/UL — LOW (ref 3.8–10.5)
WBC # FLD AUTO: 3.31 K/UL — LOW (ref 3.8–10.5)

## 2023-05-23 PROCEDURE — 99214 OFFICE O/P EST MOD 30 MIN: CPT

## 2023-05-23 RX ORDER — ROSUVASTATIN CALCIUM 5 MG/1
1 TABLET ORAL
Qty: 0 | Refills: 0 | DISCHARGE

## 2023-05-23 RX ORDER — ERGOCALCIFEROL 1.25 MG/1
1 CAPSULE ORAL
Qty: 0 | Refills: 0 | DISCHARGE

## 2023-05-23 NOTE — HISTORY OF PRESENT ILLNESS
[de-identified] : Mr. Mccarthy was found to have mild iron deficiency anemia of routine blood work at the end of 2020. Colonoscopy revealed a large tumor in the proximal transverse colon. He had no GI symptoms. A CT of the chest, abdomen and pelvis in January, 2021 revealed no evidence of metastatic disease.\par \par He underwent right hemicolectomy on January, 27, 2021 with pathology consistent with poorly differentiated adenocarcinoma measuring 2.5 cm, infiltrating into pericolonic tissue. There was lymphovascular invasion, with 0 of 14 lymph nodes involved with cancer ([id number], high-risk stage II). Proficient mismatch repair.\par \par He received adjuvant capecitabine x 6 months 3/4/21-end of August 2021 with Dr. Sumner, with minimal toxicity. His CEA was persistently elevated near 7, with multiple negative scans.\par \par ctDNA testing in January, 2022 was unrevealing.\par \par In August, 2022 CT scans showed several enlarging paraaortic lymph nodes (reference node 2.3 x 1.1cm, previously 1.4 x 0.5cm) and two (bilateral) subcentimeter lung nodules.\par \par An IR-guided biopsy of the aortocaval node shortly thereafter confirmed metastatic adenocarcinoma morphologically equivalent to his known colon primary.\par \par Mr. Mccarthy has been back to Dr. Sumner, was offered FOLFOX + bevacizumab. He has also seen doctors from [facility] [place], who offered him watchful waiting versus irinotecan. His friend, Norbert Lutherri is my patient, and suggested he see me as third opinion.\par \par Found to have stable aortocaval lymph nodes previously noted on the PET scan on CT imaging at the end of October, 2022.  \par \par CT scan completed January, 2023 showed increase in size and number of lung metastases. New liver lesions suspicious for metastatic disease. Increase in size of a periceliac lymph node. Other retroperitoneal lymph nodes are stable.\par \par Proceeded to XELOX + bevacizumab.\par  [de-identified] : Doing well overall. Thinks some of side effects may be more prolonged, but not dose-limiting. Hands with mild PPE. Moisturizing. He denies any ongoing fevers or chills, no headache, no lumps or bumps, no weight loss, no bleeding or bruising.\par \par \par

## 2023-05-23 NOTE — PHYSICAL EXAM
[Fully active, able to carry on all pre-disease performance without restriction] : Status 0 - Fully active, able to carry on all pre-disease performance without restriction [Normal] : affect appropriate [de-identified] : anicteric [de-identified] : right chest wall mediport c/d/i

## 2023-05-24 LAB
CREAT ?TM UR-MCNC: 59 MG/DL — SIGNIFICANT CHANGE UP
PROT ?TM UR-MCNC: 6 MG/DL — SIGNIFICANT CHANGE UP (ref 0–12)
PROT/CREAT UR-RTO: 0.1 RATIO — SIGNIFICANT CHANGE UP (ref 0–0.2)

## 2023-06-13 ENCOUNTER — APPOINTMENT (OUTPATIENT)
Age: 58
End: 2023-06-13

## 2023-06-13 ENCOUNTER — RESULT REVIEW (OUTPATIENT)
Age: 58
End: 2023-06-13

## 2023-06-13 LAB
BASOPHILS # BLD AUTO: 0.02 K/UL — SIGNIFICANT CHANGE UP (ref 0–0.2)
BASOPHILS NFR BLD AUTO: 0.6 % — SIGNIFICANT CHANGE UP (ref 0–2)
EOSINOPHIL # BLD AUTO: 0.11 K/UL — SIGNIFICANT CHANGE UP (ref 0–0.5)
EOSINOPHIL NFR BLD AUTO: 3 % — SIGNIFICANT CHANGE UP (ref 0–6)
HCT VFR BLD CALC: 38.8 % — LOW (ref 39–50)
HGB BLD-MCNC: 13.7 G/DL — SIGNIFICANT CHANGE UP (ref 13–17)
IMM GRANULOCYTES NFR BLD AUTO: 0.3 % — SIGNIFICANT CHANGE UP (ref 0–0.9)
LYMPHOCYTES # BLD AUTO: 1.23 K/UL — SIGNIFICANT CHANGE UP (ref 1–3.3)
LYMPHOCYTES # BLD AUTO: 33.9 % — SIGNIFICANT CHANGE UP (ref 13–44)
MCHC RBC-ENTMCNC: 35.3 GM/DL — SIGNIFICANT CHANGE UP (ref 32–36)
MCHC RBC-ENTMCNC: 35.3 PG — HIGH (ref 27–34)
MCV RBC AUTO: 100 FL — SIGNIFICANT CHANGE UP (ref 80–100)
MONOCYTES # BLD AUTO: 0.55 K/UL — SIGNIFICANT CHANGE UP (ref 0–0.9)
MONOCYTES NFR BLD AUTO: 15.2 % — HIGH (ref 2–14)
NEUTROPHILS # BLD AUTO: 1.71 K/UL — LOW (ref 1.8–7.4)
NEUTROPHILS NFR BLD AUTO: 47 % — SIGNIFICANT CHANGE UP (ref 43–77)
NRBC # BLD: 0 /100 WBCS — SIGNIFICANT CHANGE UP (ref 0–0)
PLATELET # BLD AUTO: 109 K/UL — LOW (ref 150–400)
RBC # BLD: 3.88 M/UL — LOW (ref 4.2–5.8)
RBC # FLD: 16.6 % — HIGH (ref 10.3–14.5)
WBC # BLD: 3.63 K/UL — LOW (ref 3.8–10.5)
WBC # FLD AUTO: 3.63 K/UL — LOW (ref 3.8–10.5)

## 2023-06-13 PROCEDURE — 96415 CHEMO IV INFUSION ADDL HR: CPT

## 2023-06-13 PROCEDURE — 82570 ASSAY OF URINE CREATININE: CPT

## 2023-06-13 PROCEDURE — 96411 CHEMO IV PUSH ADDL DRUG: CPT

## 2023-06-13 PROCEDURE — 84100 ASSAY OF PHOSPHORUS: CPT

## 2023-06-13 PROCEDURE — 96417 CHEMO IV INFUS EACH ADDL SEQ: CPT

## 2023-06-13 PROCEDURE — 81003 URINALYSIS AUTO W/O SCOPE: CPT

## 2023-06-13 PROCEDURE — 80053 COMPREHEN METABOLIC PANEL: CPT

## 2023-06-13 PROCEDURE — 84156 ASSAY OF PROTEIN URINE: CPT

## 2023-06-13 PROCEDURE — 96375 TX/PRO/DX INJ NEW DRUG ADDON: CPT

## 2023-06-13 PROCEDURE — 96413 CHEMO IV INFUSION 1 HR: CPT

## 2023-06-13 PROCEDURE — 85027 COMPLETE CBC AUTOMATED: CPT

## 2023-06-13 PROCEDURE — 83735 ASSAY OF MAGNESIUM: CPT

## 2023-06-14 LAB
ALBUMIN SERPL ELPH-MCNC: 3.8 G/DL — SIGNIFICANT CHANGE UP (ref 3.3–5)
ALP SERPL-CCNC: 56 U/L — SIGNIFICANT CHANGE UP (ref 40–120)
ALT FLD-CCNC: 40 U/L — SIGNIFICANT CHANGE UP (ref 10–45)
ANION GAP SERPL CALC-SCNC: 13 MMOL/L — SIGNIFICANT CHANGE UP (ref 5–17)
AST SERPL-CCNC: 45 U/L — HIGH (ref 10–40)
BILIRUB SERPL-MCNC: 1.6 MG/DL — HIGH (ref 0.2–1.2)
BUN SERPL-MCNC: 12 MG/DL — SIGNIFICANT CHANGE UP (ref 7–23)
CALCIUM SERPL-MCNC: 9.3 MG/DL — SIGNIFICANT CHANGE UP (ref 8.4–10.5)
CHLORIDE SERPL-SCNC: 99 MMOL/L — SIGNIFICANT CHANGE UP (ref 96–108)
CO2 SERPL-SCNC: 22 MMOL/L — SIGNIFICANT CHANGE UP (ref 22–31)
CREAT ?TM UR-MCNC: 72 MG/DL — SIGNIFICANT CHANGE UP
CREAT SERPL-MCNC: 0.63 MG/DL — SIGNIFICANT CHANGE UP (ref 0.5–1.3)
EGFR: 112 ML/MIN/1.73M2 — SIGNIFICANT CHANGE UP
GLUCOSE SERPL-MCNC: 320 MG/DL — HIGH (ref 70–99)
MAGNESIUM SERPL-MCNC: 1.7 MG/DL — SIGNIFICANT CHANGE UP (ref 1.6–2.6)
PHOSPHATE SERPL-MCNC: 3.3 MG/DL — SIGNIFICANT CHANGE UP (ref 2.5–4.5)
POTASSIUM SERPL-MCNC: 3.9 MMOL/L — SIGNIFICANT CHANGE UP (ref 3.5–5.3)
POTASSIUM SERPL-SCNC: 3.9 MMOL/L — SIGNIFICANT CHANGE UP (ref 3.5–5.3)
PROT ?TM UR-MCNC: 11 MG/DL — SIGNIFICANT CHANGE UP (ref 0–12)
PROT SERPL-MCNC: 6.1 G/DL — SIGNIFICANT CHANGE UP (ref 6–8.3)
PROT/CREAT UR-RTO: 0.2 RATIO — SIGNIFICANT CHANGE UP (ref 0–0.2)
SODIUM SERPL-SCNC: 134 MMOL/L — LOW (ref 135–145)

## 2023-06-29 ENCOUNTER — OUTPATIENT (OUTPATIENT)
Dept: OUTPATIENT SERVICES | Facility: HOSPITAL | Age: 58
LOS: 1 days | End: 2023-06-29
Payer: COMMERCIAL

## 2023-06-29 DIAGNOSIS — Z51.11 ENCOUNTER FOR ANTINEOPLASTIC CHEMOTHERAPY: ICD-10-CM

## 2023-06-29 DIAGNOSIS — R11.2 NAUSEA WITH VOMITING, UNSPECIFIED: ICD-10-CM

## 2023-06-29 DIAGNOSIS — Z98.890 OTHER SPECIFIED POSTPROCEDURAL STATES: Chronic | ICD-10-CM

## 2023-06-29 DIAGNOSIS — Z90.49 ACQUIRED ABSENCE OF OTHER SPECIFIED PARTS OF DIGESTIVE TRACT: Chronic | ICD-10-CM

## 2023-06-29 DIAGNOSIS — C18.9 MALIGNANT NEOPLASM OF COLON, UNSPECIFIED: ICD-10-CM

## 2023-07-05 ENCOUNTER — APPOINTMENT (OUTPATIENT)
Age: 58
End: 2023-07-05

## 2023-07-05 ENCOUNTER — RESULT REVIEW (OUTPATIENT)
Age: 58
End: 2023-07-05

## 2023-07-05 ENCOUNTER — APPOINTMENT (OUTPATIENT)
Age: 58
End: 2023-07-05
Payer: COMMERCIAL

## 2023-07-05 VITALS
BODY MASS INDEX: 33.79 KG/M2 | WEIGHT: 222.2 LBS | DIASTOLIC BLOOD PRESSURE: 84 MMHG | HEART RATE: 89 BPM | SYSTOLIC BLOOD PRESSURE: 137 MMHG | TEMPERATURE: 97.6 F

## 2023-07-05 LAB
ALBUMIN SERPL ELPH-MCNC: 3.8 G/DL — SIGNIFICANT CHANGE UP (ref 3.3–5)
ALP SERPL-CCNC: 68 U/L — SIGNIFICANT CHANGE UP (ref 40–120)
ALT FLD-CCNC: 49 U/L — HIGH (ref 10–45)
ANION GAP SERPL CALC-SCNC: 13 MMOL/L — SIGNIFICANT CHANGE UP (ref 5–17)
AST SERPL-CCNC: 55 U/L — HIGH (ref 10–40)
BASOPHILS # BLD AUTO: 0.02 K/UL — SIGNIFICANT CHANGE UP (ref 0–0.2)
BASOPHILS NFR BLD AUTO: 0.5 % — SIGNIFICANT CHANGE UP (ref 0–2)
BILIRUB SERPL-MCNC: 1.3 MG/DL — HIGH (ref 0.2–1.2)
BUN SERPL-MCNC: 12 MG/DL — SIGNIFICANT CHANGE UP (ref 7–23)
CALCIUM SERPL-MCNC: 9.4 MG/DL — SIGNIFICANT CHANGE UP (ref 8.4–10.5)
CHLORIDE SERPL-SCNC: 102 MMOL/L — SIGNIFICANT CHANGE UP (ref 96–108)
CO2 SERPL-SCNC: 21 MMOL/L — LOW (ref 22–31)
CREAT SERPL-MCNC: 0.68 MG/DL — SIGNIFICANT CHANGE UP (ref 0.5–1.3)
EGFR: 109 ML/MIN/1.73M2 — SIGNIFICANT CHANGE UP
EOSINOPHIL # BLD AUTO: 0.18 K/UL — SIGNIFICANT CHANGE UP (ref 0–0.5)
EOSINOPHIL NFR BLD AUTO: 4.1 % — SIGNIFICANT CHANGE UP (ref 0–6)
GLUCOSE SERPL-MCNC: 273 MG/DL — HIGH (ref 70–99)
HCT VFR BLD CALC: 40.1 % — SIGNIFICANT CHANGE UP (ref 39–50)
HGB BLD-MCNC: 14.3 G/DL — SIGNIFICANT CHANGE UP (ref 13–17)
IMM GRANULOCYTES NFR BLD AUTO: 0.2 % — SIGNIFICANT CHANGE UP (ref 0–0.9)
LYMPHOCYTES # BLD AUTO: 0.93 K/UL — LOW (ref 1–3.3)
LYMPHOCYTES # BLD AUTO: 21 % — SIGNIFICANT CHANGE UP (ref 13–44)
MAGNESIUM SERPL-MCNC: 1.9 MG/DL — SIGNIFICANT CHANGE UP (ref 1.6–2.6)
MCHC RBC-ENTMCNC: 35.7 GM/DL — SIGNIFICANT CHANGE UP (ref 32–36)
MCHC RBC-ENTMCNC: 35.9 PG — HIGH (ref 27–34)
MCV RBC AUTO: 100.8 FL — HIGH (ref 80–100)
MONOCYTES # BLD AUTO: 0.5 K/UL — SIGNIFICANT CHANGE UP (ref 0–0.9)
MONOCYTES NFR BLD AUTO: 11.3 % — SIGNIFICANT CHANGE UP (ref 2–14)
NEUTROPHILS # BLD AUTO: 2.79 K/UL — SIGNIFICANT CHANGE UP (ref 1.8–7.4)
NEUTROPHILS NFR BLD AUTO: 62.9 % — SIGNIFICANT CHANGE UP (ref 43–77)
NRBC # BLD: 0 /100 WBCS — SIGNIFICANT CHANGE UP (ref 0–0)
PHOSPHATE SERPL-MCNC: 3.5 MG/DL — SIGNIFICANT CHANGE UP (ref 2.5–4.5)
PLATELET # BLD AUTO: 110 K/UL — LOW (ref 150–400)
POTASSIUM SERPL-MCNC: 4.5 MMOL/L — SIGNIFICANT CHANGE UP (ref 3.5–5.3)
POTASSIUM SERPL-SCNC: 4.5 MMOL/L — SIGNIFICANT CHANGE UP (ref 3.5–5.3)
PROT SERPL-MCNC: 6.2 G/DL — SIGNIFICANT CHANGE UP (ref 6–8.3)
RBC # BLD: 3.98 M/UL — LOW (ref 4.2–5.8)
RBC # FLD: 16.8 % — HIGH (ref 10.3–14.5)
SODIUM SERPL-SCNC: 135 MMOL/L — SIGNIFICANT CHANGE UP (ref 135–145)
WBC # BLD: 4.43 K/UL — SIGNIFICANT CHANGE UP (ref 3.8–10.5)
WBC # FLD AUTO: 4.43 K/UL — SIGNIFICANT CHANGE UP (ref 3.8–10.5)

## 2023-07-05 PROCEDURE — 99214 OFFICE O/P EST MOD 30 MIN: CPT

## 2023-07-05 NOTE — PHYSICAL EXAM
[Fully active, able to carry on all pre-disease performance without restriction] : Status 0 - Fully active, able to carry on all pre-disease performance without restriction [Normal] : affect appropriate [de-identified] : anicteric [de-identified] : right chest wall mediport c/d/i

## 2023-07-05 NOTE — RESULTS/DATA
[FreeTextEntry1] : Mr. Mccarthy is a pleasant 58 year-old man with stage IV colon cancer, initially electing a watch and wait approach, now with progression, on XELOX + nicholas. Patient being seen as per physician's primary plan of care.\par

## 2023-07-05 NOTE — HISTORY OF PRESENT ILLNESS
[de-identified] : Mr. Mccarthy was found to have mild iron deficiency anemia of routine blood work at the end of 2020. Colonoscopy revealed a large tumor in the proximal transverse colon. He had no GI symptoms. A CT of the chest, abdomen and pelvis in January, 2021 revealed no evidence of metastatic disease.\par \par He underwent right hemicolectomy on January, 27, 2021 with pathology consistent with poorly differentiated adenocarcinoma measuring 2.5 cm, infiltrating into pericolonic tissue. There was lymphovascular invasion, with 0 of 14 lymph nodes involved with cancer ([id number], high-risk stage II). Proficient mismatch repair.\par \par He received adjuvant capecitabine x 6 months 3/4/21-end of August 2021 with Dr. Sumner, with minimal toxicity. His CEA was persistently elevated near 7, with multiple negative scans.\par \par ctDNA testing in January, 2022 was unrevealing.\par \par In August, 2022 CT scans showed several enlarging paraaortic lymph nodes (reference node 2.3 x 1.1cm, previously 1.4 x 0.5cm) and two (bilateral) subcentimeter lung nodules.\par \par An IR-guided biopsy of the aortocaval node shortly thereafter confirmed metastatic adenocarcinoma morphologically equivalent to his known colon primary.\par \par Mr. Mccarthy has been back to Dr. Sumner, was offered FOLFOX + bevacizumab. He has also seen doctors from [facility] [place], who offered him watchful waiting versus irinotecan. His friend, Norbert Lutherri is my patient, and suggested he see me as third opinion.\par \par Found to have stable aortocaval lymph nodes previously noted on the PET scan on CT imaging at the end of October, 2022.  \par \par CT scan completed January, 2023 showed increase in size and number of lung metastases. New liver lesions suspicious for metastatic disease. Increase in size of a periceliac lymph node. Other retroperitoneal lymph nodes are stable.\par \par Proceeded to XELOX + bevacizumab.\par  [de-identified] : Doing well overall. Thinks some of side effects may be more prolonged, but not dose-limiting. Hands with mild PPE. Moisturizing. He denies any ongoing fevers or chills, no headache, no lumps or bumps, no weight loss, no bleeding or bruising.\par \par \par

## 2023-07-06 LAB
CREAT ?TM UR-MCNC: 87 MG/DL — SIGNIFICANT CHANGE UP
PROT ?TM UR-MCNC: 9 MG/DL — SIGNIFICANT CHANGE UP (ref 0–12)
PROT/CREAT UR-RTO: 0.1 RATIO — SIGNIFICANT CHANGE UP (ref 0–0.2)

## 2023-07-24 ENCOUNTER — OUTPATIENT (OUTPATIENT)
Dept: OUTPATIENT SERVICES | Facility: HOSPITAL | Age: 58
LOS: 1 days | End: 2023-07-24
Payer: COMMERCIAL

## 2023-07-24 ENCOUNTER — APPOINTMENT (OUTPATIENT)
Dept: CT IMAGING | Facility: CLINIC | Age: 58
End: 2023-07-24
Payer: COMMERCIAL

## 2023-07-24 DIAGNOSIS — Z00.8 ENCOUNTER FOR OTHER GENERAL EXAMINATION: ICD-10-CM

## 2023-07-24 DIAGNOSIS — Z98.890 OTHER SPECIFIED POSTPROCEDURAL STATES: Chronic | ICD-10-CM

## 2023-07-24 DIAGNOSIS — Z90.49 ACQUIRED ABSENCE OF OTHER SPECIFIED PARTS OF DIGESTIVE TRACT: Chronic | ICD-10-CM

## 2023-07-24 DIAGNOSIS — C18.9 MALIGNANT NEOPLASM OF COLON, UNSPECIFIED: ICD-10-CM

## 2023-07-24 PROCEDURE — 71260 CT THORAX DX C+: CPT

## 2023-07-24 PROCEDURE — 74177 CT ABD & PELVIS W/CONTRAST: CPT

## 2023-07-24 PROCEDURE — 74177 CT ABD & PELVIS W/CONTRAST: CPT | Mod: 26

## 2023-07-24 PROCEDURE — 71260 CT THORAX DX C+: CPT | Mod: 26

## 2023-07-24 NOTE — HISTORY OF PRESENT ILLNESS
[de-identified] : Mr. Mccarthy was found to have mild iron deficiency anemia of routine blood work at the end of 2020. Colonoscopy revealed a large tumor in the proximal transverse colon. He had no GI symptoms. A CT of the chest, abdomen and pelvis in January, 2021 revealed no evidence of metastatic disease.\par \par He underwent right hemicolectomy on January, 27, 2021 with pathology consistent with poorly differentiated adenocarcinoma measuring 2.5 cm, infiltrating into pericolonic tissue. There was lymphovascular invasion, with 0 of 14 lymph nodes involved with cancer ([id number], high-risk stage II). Proficient mismatch repair.\par \par He received adjuvant capecitabine x 6 months 3/4/21-end of August 2021 with Dr. Sumner, with minimal toxicity. His CEA was persistently elevated near 7, with multiple negative scans.\par \par ctDNA testing in January, 2022 was unrevealing.\par \par In August, 2022 CT scans showed several enlarging paraaortic lymph nodes (reference node 2.3 x 1.1cm, previously 1.4 x 0.5cm) and two (bilateral) subcentimeter lung nodules.\par \par An IR-guided biopsy of the aortocaval node shortly thereafter confirmed metastatic adenocarcinoma morphologically equivalent to his known colon primary.\par \par Mr. Mccarthy has been back to Dr. Sumner, was offered FOLFOX + bevacizumab. He has also seen [place] [facility] [place] who offered him watchful waiting versus irinotecan. His friend, Norbert Lutherri is my patient, and suggested he see me as third opinion.\par \par Found to have stable aortocaval lymph nodes previously noted on the PET scan on CT imaging at the end of October, 2022.  \par \par CT scan completed January, 2023 showed increase in size and number of lung metastases. New liver lesions suspicious for metastatic disease. Increase in size of a periceliac lymph node. Other retroperitoneal lymph nodes are stable.\par \par Proceeded to XELOX + bevacizumab.\par  [de-identified] : Doing well overall. Thinks some of side effects may be more prolonged, but not dose-limiting. Hands with mild PPE. Moisturizing. He denies any ongoing fevers or chills, no headache, no lumps or bumps, no weight loss, no bleeding or bruising.\par \par \par

## 2023-07-24 NOTE — PHYSICAL EXAM
[Fully active, able to carry on all pre-disease performance without restriction] : Status 0 - Fully active, able to carry on all pre-disease performance without restriction [Normal] : affect appropriate [de-identified] : anicteric [de-identified] : right chest wall mediport c/d/i

## 2023-07-25 ENCOUNTER — RESULT REVIEW (OUTPATIENT)
Age: 58
End: 2023-07-25

## 2023-07-25 ENCOUNTER — APPOINTMENT (OUTPATIENT)
Age: 58
End: 2023-07-25
Payer: COMMERCIAL

## 2023-07-25 ENCOUNTER — APPOINTMENT (OUTPATIENT)
Age: 58
End: 2023-07-25

## 2023-07-25 VITALS
BODY MASS INDEX: 33.39 KG/M2 | WEIGHT: 219.58 LBS | HEART RATE: 69 BPM | TEMPERATURE: 97.9 F | RESPIRATION RATE: 18 BRPM | SYSTOLIC BLOOD PRESSURE: 128 MMHG | DIASTOLIC BLOOD PRESSURE: 72 MMHG

## 2023-07-25 LAB
BASOPHILS # BLD AUTO: 0.02 K/UL — SIGNIFICANT CHANGE UP (ref 0–0.2)
BASOPHILS NFR BLD AUTO: 0.7 % — SIGNIFICANT CHANGE UP (ref 0–2)
CREAT ?TM UR-MCNC: 80 MG/DL — SIGNIFICANT CHANGE UP
EOSINOPHIL # BLD AUTO: 0.09 K/UL — SIGNIFICANT CHANGE UP (ref 0–0.5)
EOSINOPHIL NFR BLD AUTO: 3.4 % — SIGNIFICANT CHANGE UP (ref 0–6)
HCT VFR BLD CALC: 38.5 % — LOW (ref 39–50)
HGB BLD-MCNC: 13.6 G/DL — SIGNIFICANT CHANGE UP (ref 13–17)
IMM GRANULOCYTES NFR BLD AUTO: 0.4 % — SIGNIFICANT CHANGE UP (ref 0–0.9)
LYMPHOCYTES # BLD AUTO: 1.03 K/UL — SIGNIFICANT CHANGE UP (ref 1–3.3)
LYMPHOCYTES # BLD AUTO: 38.4 % — SIGNIFICANT CHANGE UP (ref 13–44)
MCHC RBC-ENTMCNC: 35.3 GM/DL — SIGNIFICANT CHANGE UP (ref 32–36)
MCHC RBC-ENTMCNC: 35.4 PG — HIGH (ref 27–34)
MCV RBC AUTO: 100.3 FL — HIGH (ref 80–100)
MONOCYTES # BLD AUTO: 0.49 K/UL — SIGNIFICANT CHANGE UP (ref 0–0.9)
MONOCYTES NFR BLD AUTO: 18.3 % — HIGH (ref 2–14)
NEUTROPHILS # BLD AUTO: 1.04 K/UL — LOW (ref 1.8–7.4)
NEUTROPHILS NFR BLD AUTO: 38.8 % — LOW (ref 43–77)
NRBC # BLD: 0 /100 WBCS — SIGNIFICANT CHANGE UP (ref 0–0)
PLATELET # BLD AUTO: 109 K/UL — LOW (ref 150–400)
PROT ?TM UR-MCNC: 7 MG/DL — SIGNIFICANT CHANGE UP (ref 0–12)
PROT/CREAT UR-RTO: 0.1 RATIO — SIGNIFICANT CHANGE UP (ref 0–0.2)
RBC # BLD: 3.84 M/UL — LOW (ref 4.2–5.8)
RBC # FLD: 17 % — HIGH (ref 10.3–14.5)
WBC # BLD: 2.68 K/UL — LOW (ref 3.8–10.5)
WBC # FLD AUTO: 2.68 K/UL — LOW (ref 3.8–10.5)

## 2023-07-25 PROCEDURE — 99214 OFFICE O/P EST MOD 30 MIN: CPT

## 2023-07-26 LAB
ALBUMIN SERPL ELPH-MCNC: 3.8 G/DL — SIGNIFICANT CHANGE UP (ref 3.3–5)
ALP SERPL-CCNC: 60 U/L — SIGNIFICANT CHANGE UP (ref 40–120)
ALT FLD-CCNC: 36 U/L — SIGNIFICANT CHANGE UP (ref 10–45)
ANION GAP SERPL CALC-SCNC: 14 MMOL/L — SIGNIFICANT CHANGE UP (ref 5–17)
AST SERPL-CCNC: 53 U/L — HIGH (ref 10–40)
BILIRUB SERPL-MCNC: 1.6 MG/DL — HIGH (ref 0.2–1.2)
BUN SERPL-MCNC: 11 MG/DL — SIGNIFICANT CHANGE UP (ref 7–23)
CALCIUM SERPL-MCNC: 9.2 MG/DL — SIGNIFICANT CHANGE UP (ref 8.4–10.5)
CHLORIDE SERPL-SCNC: 101 MMOL/L — SIGNIFICANT CHANGE UP (ref 96–108)
CO2 SERPL-SCNC: 21 MMOL/L — LOW (ref 22–31)
CREAT SERPL-MCNC: 0.66 MG/DL — SIGNIFICANT CHANGE UP (ref 0.5–1.3)
EGFR: 110 ML/MIN/1.73M2 — SIGNIFICANT CHANGE UP
GLUCOSE SERPL-MCNC: 309 MG/DL — HIGH (ref 70–99)
MAGNESIUM SERPL-MCNC: 1.8 MG/DL — SIGNIFICANT CHANGE UP (ref 1.6–2.6)
PHOSPHATE SERPL-MCNC: 3.7 MG/DL — SIGNIFICANT CHANGE UP (ref 2.5–4.5)
POTASSIUM SERPL-MCNC: 3.7 MMOL/L — SIGNIFICANT CHANGE UP (ref 3.5–5.3)
POTASSIUM SERPL-SCNC: 3.7 MMOL/L — SIGNIFICANT CHANGE UP (ref 3.5–5.3)
PROT SERPL-MCNC: 6.1 G/DL — SIGNIFICANT CHANGE UP (ref 6–8.3)
SODIUM SERPL-SCNC: 136 MMOL/L — SIGNIFICANT CHANGE UP (ref 135–145)

## 2023-08-15 ENCOUNTER — RESULT REVIEW (OUTPATIENT)
Age: 58
End: 2023-08-15

## 2023-08-15 ENCOUNTER — APPOINTMENT (OUTPATIENT)
Age: 58
End: 2023-08-15

## 2023-08-15 LAB
BASOPHILS # BLD AUTO: 0.02 K/UL — SIGNIFICANT CHANGE UP (ref 0–0.2)
BASOPHILS NFR BLD AUTO: 0.6 % — SIGNIFICANT CHANGE UP (ref 0–2)
CREAT ?TM UR-MCNC: 131 MG/DL — SIGNIFICANT CHANGE UP
EOSINOPHIL # BLD AUTO: 0.07 K/UL — SIGNIFICANT CHANGE UP (ref 0–0.5)
EOSINOPHIL NFR BLD AUTO: 2.2 % — SIGNIFICANT CHANGE UP (ref 0–6)
HCT VFR BLD CALC: 42.8 % — SIGNIFICANT CHANGE UP (ref 39–50)
HGB BLD-MCNC: 15.4 G/DL — SIGNIFICANT CHANGE UP (ref 13–17)
IMM GRANULOCYTES NFR BLD AUTO: 0 % — SIGNIFICANT CHANGE UP (ref 0–0.9)
LYMPHOCYTES # BLD AUTO: 1 K/UL — SIGNIFICANT CHANGE UP (ref 1–3.3)
LYMPHOCYTES # BLD AUTO: 32.1 % — SIGNIFICANT CHANGE UP (ref 13–44)
MCHC RBC-ENTMCNC: 34.7 PG — HIGH (ref 27–34)
MCHC RBC-ENTMCNC: 36 GM/DL — SIGNIFICANT CHANGE UP (ref 32–36)
MCV RBC AUTO: 96.4 FL — SIGNIFICANT CHANGE UP (ref 80–100)
MONOCYTES # BLD AUTO: 0.33 K/UL — SIGNIFICANT CHANGE UP (ref 0–0.9)
MONOCYTES NFR BLD AUTO: 10.6 % — SIGNIFICANT CHANGE UP (ref 2–14)
NEUTROPHILS # BLD AUTO: 1.7 K/UL — LOW (ref 1.8–7.4)
NEUTROPHILS NFR BLD AUTO: 54.5 % — SIGNIFICANT CHANGE UP (ref 43–77)
NRBC # BLD: 0 /100 WBCS — SIGNIFICANT CHANGE UP (ref 0–0)
PLATELET # BLD AUTO: 87 K/UL — LOW (ref 150–400)
PROT ?TM UR-MCNC: 11 MG/DL — SIGNIFICANT CHANGE UP (ref 0–12)
PROT/CREAT UR-RTO: 0.1 RATIO — SIGNIFICANT CHANGE UP (ref 0–0.2)
RBC # BLD: 4.44 M/UL — SIGNIFICANT CHANGE UP (ref 4.2–5.8)
RBC # FLD: 13.2 % — SIGNIFICANT CHANGE UP (ref 10.3–14.5)
WBC # BLD: 3.12 K/UL — LOW (ref 3.8–10.5)
WBC # FLD AUTO: 3.12 K/UL — LOW (ref 3.8–10.5)

## 2023-08-15 PROCEDURE — 82570 ASSAY OF URINE CREATININE: CPT

## 2023-08-15 PROCEDURE — 84100 ASSAY OF PHOSPHORUS: CPT

## 2023-08-15 PROCEDURE — 85027 COMPLETE CBC AUTOMATED: CPT

## 2023-08-15 PROCEDURE — 96413 CHEMO IV INFUSION 1 HR: CPT

## 2023-08-15 PROCEDURE — 96375 TX/PRO/DX INJ NEW DRUG ADDON: CPT

## 2023-08-15 PROCEDURE — 84156 ASSAY OF PROTEIN URINE: CPT

## 2023-08-15 PROCEDURE — 96415 CHEMO IV INFUSION ADDL HR: CPT

## 2023-08-15 PROCEDURE — 83735 ASSAY OF MAGNESIUM: CPT

## 2023-08-15 PROCEDURE — 80053 COMPREHEN METABOLIC PANEL: CPT

## 2023-08-15 PROCEDURE — 96417 CHEMO IV INFUS EACH ADDL SEQ: CPT

## 2023-09-07 ENCOUNTER — OUTPATIENT (OUTPATIENT)
Dept: OUTPATIENT SERVICES | Facility: HOSPITAL | Age: 58
LOS: 1 days | End: 2023-09-07
Payer: COMMERCIAL

## 2023-09-07 DIAGNOSIS — R11.2 NAUSEA WITH VOMITING, UNSPECIFIED: ICD-10-CM

## 2023-09-07 DIAGNOSIS — Z90.49 ACQUIRED ABSENCE OF OTHER SPECIFIED PARTS OF DIGESTIVE TRACT: Chronic | ICD-10-CM

## 2023-09-07 DIAGNOSIS — C18.9 MALIGNANT NEOPLASM OF COLON, UNSPECIFIED: ICD-10-CM

## 2023-09-07 DIAGNOSIS — Z98.890 OTHER SPECIFIED POSTPROCEDURAL STATES: Chronic | ICD-10-CM

## 2023-09-07 DIAGNOSIS — Z51.11 ENCOUNTER FOR ANTINEOPLASTIC CHEMOTHERAPY: ICD-10-CM

## 2023-09-10 NOTE — REVIEW OF SYSTEMS
[Fatigue] : fatigue [Dysphagia] : no dysphagia [Recent Change In Weight] : ~T no recent weight change [Odynophagia] : no odynophagia [Chest Pain] : no chest pain [Shortness Of Breath] : no shortness of breath [Joint Pain] : no joint pain [Joint Stiffness] : no joint stiffness [Skin Rash] : no skin rash [Easy Bleeding] : no tendency for easy bleeding [Easy Bruising] : no tendency for easy bruising [Swollen Glands] : no swollen glands

## 2023-09-10 NOTE — PHYSICAL EXAM
[Fully active, able to carry on all pre-disease performance without restriction] : Status 0 - Fully active, able to carry on all pre-disease performance without restriction [Normal] : affect appropriate [de-identified] : anicteric [de-identified] : right chest wall mediport c/d/i

## 2023-09-10 NOTE — HISTORY OF PRESENT ILLNESS
[de-identified] : Mr. Mccarthy was found to have mild iron deficiency anemia of routine blood work at the end of 2020. Colonoscopy revealed a large tumor in the proximal transverse colon. He had no GI symptoms. A CT of the chest, abdomen and pelvis in January, 2021 revealed no evidence of metastatic disease.\par  \par  He underwent right hemicolectomy on January, 27, 2021 with pathology consistent with poorly differentiated adenocarcinoma measuring 2.5 cm, infiltrating into pericolonic tissue. There was lymphovascular invasion, with 0 of 14 lymph nodes involved with cancer ([id number], high-risk stage II). Proficient mismatch repair.\par  \par  He received adjuvant capecitabine x 6 months 3/4/21-end of August 2021 with Dr. Sumner, with minimal toxicity. His CEA was persistently elevated near 7, with multiple negative scans.\par  \par  ctDNA testing in January, 2022 was unrevealing.\par  \par  In August, 2022 CT scans showed several enlarging paraaortic lymph nodes (reference node 2.3 x 1.1cm, previously 1.4 x 0.5cm) and two (bilateral) subcentimeter lung nodules.\par  \par  An IR-guided biopsy of the aortocaval node shortly thereafter confirmed metastatic adenocarcinoma morphologically equivalent to his known colon primary.\par  \par  Mr. Mccarthy has been back to Dr. Sumner, was offered FOLFOX + bevacizumab. He has also [place] [facility] [place] who offered him watchful waiting versus irinotecan. His friend, Norbert Lutherri is my patient, and suggested he see me as third opinion.\par  \par  Found to have stable aortocaval lymph nodes previously noted on the PET scan on CT imaging at the end of October, 2022.  \par  \par  CT scan completed January, 2023 showed increase in size and number of lung metastases. New liver lesions suspicious for metastatic disease. Increase in size of a periceliac lymph node. Other retroperitoneal lymph nodes are stable.\par  \par  Proceeded to XELOX + bevacizumab.\par   [de-identified] : Doing well overall. Thinks some of side effects may be more prolonged, but not dose-limiting. Hands with mild PPE. Moisturizing. He denies any ongoing fevers or chills, no headache, no lumps or bumps, no weight loss, no bleeding or bruising.\par  \par  \par

## 2023-09-12 ENCOUNTER — RESULT REVIEW (OUTPATIENT)
Age: 58
End: 2023-09-12

## 2023-09-12 ENCOUNTER — APPOINTMENT (OUTPATIENT)
Age: 58
End: 2023-09-12
Payer: COMMERCIAL

## 2023-09-12 ENCOUNTER — APPOINTMENT (OUTPATIENT)
Age: 58
End: 2023-09-12

## 2023-09-12 VITALS
WEIGHT: 208.12 LBS | TEMPERATURE: 98.2 F | OXYGEN SATURATION: 96 % | RESPIRATION RATE: 16 BRPM | SYSTOLIC BLOOD PRESSURE: 127 MMHG | HEART RATE: 97 BPM | BODY MASS INDEX: 31.54 KG/M2 | DIASTOLIC BLOOD PRESSURE: 82 MMHG | HEIGHT: 68 IN

## 2023-09-12 LAB
BASOPHILS # BLD AUTO: 0.04 K/UL — SIGNIFICANT CHANGE UP (ref 0–0.2)
BASOPHILS NFR BLD AUTO: 0.6 % — SIGNIFICANT CHANGE UP (ref 0–2)
CREAT ?TM UR-MCNC: 133 MG/DL — SIGNIFICANT CHANGE UP
EOSINOPHIL # BLD AUTO: 0.17 K/UL — SIGNIFICANT CHANGE UP (ref 0–0.5)
EOSINOPHIL NFR BLD AUTO: 2.6 % — SIGNIFICANT CHANGE UP (ref 0–6)
HCT VFR BLD CALC: 48.3 % — SIGNIFICANT CHANGE UP (ref 39–50)
HGB BLD-MCNC: 17.2 G/DL — HIGH (ref 13–17)
IMM GRANULOCYTES NFR BLD AUTO: 0.3 % — SIGNIFICANT CHANGE UP (ref 0–0.9)
LYMPHOCYTES # BLD AUTO: 1.9 K/UL — SIGNIFICANT CHANGE UP (ref 1–3.3)
LYMPHOCYTES # BLD AUTO: 28.7 % — SIGNIFICANT CHANGE UP (ref 13–44)
MCHC RBC-ENTMCNC: 32.6 PG — SIGNIFICANT CHANGE UP (ref 27–34)
MCHC RBC-ENTMCNC: 35.6 GM/DL — SIGNIFICANT CHANGE UP (ref 32–36)
MCV RBC AUTO: 91.7 FL — SIGNIFICANT CHANGE UP (ref 80–100)
MONOCYTES # BLD AUTO: 0.58 K/UL — SIGNIFICANT CHANGE UP (ref 0–0.9)
MONOCYTES NFR BLD AUTO: 8.8 % — SIGNIFICANT CHANGE UP (ref 2–14)
NEUTROPHILS # BLD AUTO: 3.9 K/UL — SIGNIFICANT CHANGE UP (ref 1.8–7.4)
NEUTROPHILS NFR BLD AUTO: 59 % — SIGNIFICANT CHANGE UP (ref 43–77)
NRBC # BLD: 0 /100 WBCS — SIGNIFICANT CHANGE UP (ref 0–0)
PLATELET # BLD AUTO: 118 K/UL — LOW (ref 150–400)
PROT ?TM UR-MCNC: 13 MG/DL — HIGH (ref 0–12)
PROT/CREAT UR-RTO: 0.1 RATIO — SIGNIFICANT CHANGE UP (ref 0–0.2)
RBC # BLD: 5.27 M/UL — SIGNIFICANT CHANGE UP (ref 4.2–5.8)
RBC # FLD: 12.7 % — SIGNIFICANT CHANGE UP (ref 10.3–14.5)
WBC # BLD: 6.61 K/UL — SIGNIFICANT CHANGE UP (ref 3.8–10.5)
WBC # FLD AUTO: 6.61 K/UL — SIGNIFICANT CHANGE UP (ref 3.8–10.5)

## 2023-09-12 PROCEDURE — 99214 OFFICE O/P EST MOD 30 MIN: CPT

## 2023-09-14 RX ORDER — BACLOFEN 5 MG/1
5 TABLET ORAL 3 TIMES DAILY
Qty: 60 | Refills: 0 | Status: COMPLETED | COMMUNITY
Start: 2023-08-15 | End: 2023-09-14

## 2023-09-26 ENCOUNTER — OUTPATIENT (OUTPATIENT)
Dept: OUTPATIENT SERVICES | Facility: HOSPITAL | Age: 58
LOS: 1 days | End: 2023-09-26
Payer: COMMERCIAL

## 2023-09-26 ENCOUNTER — APPOINTMENT (OUTPATIENT)
Dept: MRI IMAGING | Facility: CLINIC | Age: 58
End: 2023-09-26
Payer: COMMERCIAL

## 2023-09-26 DIAGNOSIS — Z98.890 OTHER SPECIFIED POSTPROCEDURAL STATES: Chronic | ICD-10-CM

## 2023-09-26 DIAGNOSIS — M54.50 LOW BACK PAIN, UNSPECIFIED: ICD-10-CM

## 2023-09-26 PROCEDURE — 72158 MRI LUMBAR SPINE W/O & W/DYE: CPT | Mod: 26

## 2023-09-26 PROCEDURE — 72158 MRI LUMBAR SPINE W/O & W/DYE: CPT

## 2023-09-26 PROCEDURE — A9585: CPT

## 2023-10-03 ENCOUNTER — RESULT REVIEW (OUTPATIENT)
Age: 58
End: 2023-10-03

## 2023-10-03 ENCOUNTER — APPOINTMENT (OUTPATIENT)
Age: 58
End: 2023-10-03

## 2023-10-03 VITALS
HEIGHT: 68 IN | RESPIRATION RATE: 16 BRPM | DIASTOLIC BLOOD PRESSURE: 73 MMHG | OXYGEN SATURATION: 98 % | BODY MASS INDEX: 32.14 KG/M2 | HEART RATE: 77 BPM | SYSTOLIC BLOOD PRESSURE: 136 MMHG | TEMPERATURE: 97.7 F | WEIGHT: 212.08 LBS

## 2023-10-03 LAB
BASOPHILS # BLD AUTO: 0.02 K/UL — SIGNIFICANT CHANGE UP (ref 0–0.2)
BASOPHILS NFR BLD AUTO: 0.5 % — SIGNIFICANT CHANGE UP (ref 0–2)
EOSINOPHIL # BLD AUTO: 0.1 K/UL — SIGNIFICANT CHANGE UP (ref 0–0.5)
EOSINOPHIL NFR BLD AUTO: 2.3 % — SIGNIFICANT CHANGE UP (ref 0–6)
HCT VFR BLD CALC: 42.4 % — SIGNIFICANT CHANGE UP (ref 39–50)
HGB BLD-MCNC: 15.3 G/DL — SIGNIFICANT CHANGE UP (ref 13–17)
IMM GRANULOCYTES NFR BLD AUTO: 0.5 % — SIGNIFICANT CHANGE UP (ref 0–0.9)
LYMPHOCYTES # BLD AUTO: 1.26 K/UL — SIGNIFICANT CHANGE UP (ref 1–3.3)
LYMPHOCYTES # BLD AUTO: 28.7 % — SIGNIFICANT CHANGE UP (ref 13–44)
MCHC RBC-ENTMCNC: 33.2 PG — SIGNIFICANT CHANGE UP (ref 27–34)
MCHC RBC-ENTMCNC: 36.1 GM/DL — HIGH (ref 32–36)
MCV RBC AUTO: 92 FL — SIGNIFICANT CHANGE UP (ref 80–100)
MONOCYTES # BLD AUTO: 0.38 K/UL — SIGNIFICANT CHANGE UP (ref 0–0.9)
MONOCYTES NFR BLD AUTO: 8.7 % — SIGNIFICANT CHANGE UP (ref 2–14)
NEUTROPHILS # BLD AUTO: 2.61 K/UL — SIGNIFICANT CHANGE UP (ref 1.8–7.4)
NEUTROPHILS NFR BLD AUTO: 59.3 % — SIGNIFICANT CHANGE UP (ref 43–77)
NRBC # BLD: 0 /100 WBCS — SIGNIFICANT CHANGE UP (ref 0–0)
PLATELET # BLD AUTO: 104 K/UL — LOW (ref 150–400)
RBC # BLD: 4.61 M/UL — SIGNIFICANT CHANGE UP (ref 4.2–5.8)
RBC # FLD: 12.7 % — SIGNIFICANT CHANGE UP (ref 10.3–14.5)
WBC # BLD: 4.39 K/UL — SIGNIFICANT CHANGE UP (ref 3.8–10.5)
WBC # FLD AUTO: 4.39 K/UL — SIGNIFICANT CHANGE UP (ref 3.8–10.5)

## 2023-10-04 LAB
CREAT ?TM UR-MCNC: 74 MG/DL — SIGNIFICANT CHANGE UP
PROT ?TM UR-MCNC: 6 MG/DL — SIGNIFICANT CHANGE UP (ref 0–12)
PROT/CREAT UR-RTO: 0.1 RATIO — SIGNIFICANT CHANGE UP (ref 0–0.2)

## 2023-10-20 ENCOUNTER — RESULT REVIEW (OUTPATIENT)
Age: 58
End: 2023-10-20

## 2023-10-20 ENCOUNTER — APPOINTMENT (OUTPATIENT)
Age: 58
End: 2023-10-20

## 2023-10-20 PROCEDURE — 85027 COMPLETE CBC AUTOMATED: CPT

## 2023-10-20 PROCEDURE — 84156 ASSAY OF PROTEIN URINE: CPT

## 2023-10-20 PROCEDURE — 82570 ASSAY OF URINE CREATININE: CPT

## 2023-10-20 PROCEDURE — 96409 CHEMO IV PUSH SNGL DRUG: CPT

## 2023-10-20 PROCEDURE — 96413 CHEMO IV INFUSION 1 HR: CPT

## 2023-10-21 LAB
CREAT ?TM UR-MCNC: 99 MG/DL — SIGNIFICANT CHANGE UP
CREAT ?TM UR-MCNC: 99 MG/DL — SIGNIFICANT CHANGE UP
PROT ?TM UR-MCNC: 6 MG/DL — SIGNIFICANT CHANGE UP (ref 0–12)
PROT ?TM UR-MCNC: 6 MG/DL — SIGNIFICANT CHANGE UP (ref 0–12)
PROT/CREAT UR-RTO: 0.1 RATIO — SIGNIFICANT CHANGE UP (ref 0–0.2)
PROT/CREAT UR-RTO: 0.1 RATIO — SIGNIFICANT CHANGE UP (ref 0–0.2)

## 2023-10-24 ENCOUNTER — APPOINTMENT (OUTPATIENT)
Age: 58
End: 2023-10-24

## 2023-10-26 ENCOUNTER — RESULT REVIEW (OUTPATIENT)
Age: 58
End: 2023-10-26

## 2023-10-31 ENCOUNTER — APPOINTMENT (OUTPATIENT)
Dept: CT IMAGING | Facility: CLINIC | Age: 58
End: 2023-10-31
Payer: COMMERCIAL

## 2023-10-31 ENCOUNTER — OUTPATIENT (OUTPATIENT)
Dept: OUTPATIENT SERVICES | Facility: HOSPITAL | Age: 58
LOS: 1 days | End: 2023-10-31
Payer: COMMERCIAL

## 2023-10-31 DIAGNOSIS — Z90.49 ACQUIRED ABSENCE OF OTHER SPECIFIED PARTS OF DIGESTIVE TRACT: Chronic | ICD-10-CM

## 2023-10-31 DIAGNOSIS — Z98.890 OTHER SPECIFIED POSTPROCEDURAL STATES: Chronic | ICD-10-CM

## 2023-10-31 DIAGNOSIS — C18.9 MALIGNANT NEOPLASM OF COLON, UNSPECIFIED: ICD-10-CM

## 2023-10-31 PROCEDURE — 74177 CT ABD & PELVIS W/CONTRAST: CPT | Mod: 26

## 2023-10-31 PROCEDURE — 74177 CT ABD & PELVIS W/CONTRAST: CPT

## 2023-10-31 PROCEDURE — 71260 CT THORAX DX C+: CPT

## 2023-10-31 PROCEDURE — 71260 CT THORAX DX C+: CPT | Mod: 26

## 2023-11-02 ENCOUNTER — NON-APPOINTMENT (OUTPATIENT)
Age: 58
End: 2023-11-02

## 2023-11-06 ENCOUNTER — OUTPATIENT (OUTPATIENT)
Dept: OUTPATIENT SERVICES | Facility: HOSPITAL | Age: 58
LOS: 1 days | End: 2023-11-06
Payer: COMMERCIAL

## 2023-11-06 DIAGNOSIS — C18.9 MALIGNANT NEOPLASM OF COLON, UNSPECIFIED: ICD-10-CM

## 2023-11-06 DIAGNOSIS — Z90.49 ACQUIRED ABSENCE OF OTHER SPECIFIED PARTS OF DIGESTIVE TRACT: Chronic | ICD-10-CM

## 2023-11-06 DIAGNOSIS — Z51.11 ENCOUNTER FOR ANTINEOPLASTIC CHEMOTHERAPY: ICD-10-CM

## 2023-11-06 DIAGNOSIS — R11.2 NAUSEA WITH VOMITING, UNSPECIFIED: ICD-10-CM

## 2023-11-06 DIAGNOSIS — Z98.890 OTHER SPECIFIED POSTPROCEDURAL STATES: Chronic | ICD-10-CM

## 2023-11-14 ENCOUNTER — RESULT REVIEW (OUTPATIENT)
Age: 58
End: 2023-11-14

## 2023-11-14 ENCOUNTER — APPOINTMENT (OUTPATIENT)
Age: 58
End: 2023-11-14

## 2023-11-14 LAB
BASOPHILS # BLD AUTO: 0.02 K/UL — SIGNIFICANT CHANGE UP (ref 0–0.2)
BASOPHILS # BLD AUTO: 0.02 K/UL — SIGNIFICANT CHANGE UP (ref 0–0.2)
BASOPHILS NFR BLD AUTO: 0.4 % — SIGNIFICANT CHANGE UP (ref 0–2)
BASOPHILS NFR BLD AUTO: 0.4 % — SIGNIFICANT CHANGE UP (ref 0–2)
EOSINOPHIL # BLD AUTO: 0.14 K/UL — SIGNIFICANT CHANGE UP (ref 0–0.5)
EOSINOPHIL # BLD AUTO: 0.14 K/UL — SIGNIFICANT CHANGE UP (ref 0–0.5)
EOSINOPHIL NFR BLD AUTO: 2.8 % — SIGNIFICANT CHANGE UP (ref 0–6)
EOSINOPHIL NFR BLD AUTO: 2.8 % — SIGNIFICANT CHANGE UP (ref 0–6)
HCT VFR BLD CALC: 43.5 % — SIGNIFICANT CHANGE UP (ref 39–50)
HCT VFR BLD CALC: 43.5 % — SIGNIFICANT CHANGE UP (ref 39–50)
HGB BLD-MCNC: 15.4 G/DL — SIGNIFICANT CHANGE UP (ref 13–17)
HGB BLD-MCNC: 15.4 G/DL — SIGNIFICANT CHANGE UP (ref 13–17)
IMM GRANULOCYTES NFR BLD AUTO: 0.2 % — SIGNIFICANT CHANGE UP (ref 0–0.9)
IMM GRANULOCYTES NFR BLD AUTO: 0.2 % — SIGNIFICANT CHANGE UP (ref 0–0.9)
LYMPHOCYTES # BLD AUTO: 1.35 K/UL — SIGNIFICANT CHANGE UP (ref 1–3.3)
LYMPHOCYTES # BLD AUTO: 1.35 K/UL — SIGNIFICANT CHANGE UP (ref 1–3.3)
LYMPHOCYTES # BLD AUTO: 27.4 % — SIGNIFICANT CHANGE UP (ref 13–44)
LYMPHOCYTES # BLD AUTO: 27.4 % — SIGNIFICANT CHANGE UP (ref 13–44)
MCHC RBC-ENTMCNC: 32.6 PG — SIGNIFICANT CHANGE UP (ref 27–34)
MCHC RBC-ENTMCNC: 32.6 PG — SIGNIFICANT CHANGE UP (ref 27–34)
MCHC RBC-ENTMCNC: 35.4 GM/DL — SIGNIFICANT CHANGE UP (ref 32–36)
MCHC RBC-ENTMCNC: 35.4 GM/DL — SIGNIFICANT CHANGE UP (ref 32–36)
MCV RBC AUTO: 92 FL — SIGNIFICANT CHANGE UP (ref 80–100)
MCV RBC AUTO: 92 FL — SIGNIFICANT CHANGE UP (ref 80–100)
MONOCYTES # BLD AUTO: 0.43 K/UL — SIGNIFICANT CHANGE UP (ref 0–0.9)
MONOCYTES # BLD AUTO: 0.43 K/UL — SIGNIFICANT CHANGE UP (ref 0–0.9)
MONOCYTES NFR BLD AUTO: 8.7 % — SIGNIFICANT CHANGE UP (ref 2–14)
MONOCYTES NFR BLD AUTO: 8.7 % — SIGNIFICANT CHANGE UP (ref 2–14)
NEUTROPHILS # BLD AUTO: 2.97 K/UL — SIGNIFICANT CHANGE UP (ref 1.8–7.4)
NEUTROPHILS # BLD AUTO: 2.97 K/UL — SIGNIFICANT CHANGE UP (ref 1.8–7.4)
NEUTROPHILS NFR BLD AUTO: 60.5 % — SIGNIFICANT CHANGE UP (ref 43–77)
NEUTROPHILS NFR BLD AUTO: 60.5 % — SIGNIFICANT CHANGE UP (ref 43–77)
NRBC # BLD: 0 /100 WBCS — SIGNIFICANT CHANGE UP (ref 0–0)
NRBC # BLD: 0 /100 WBCS — SIGNIFICANT CHANGE UP (ref 0–0)
PLATELET # BLD AUTO: 106 K/UL — LOW (ref 150–400)
PLATELET # BLD AUTO: 106 K/UL — LOW (ref 150–400)
RBC # BLD: 4.73 M/UL — SIGNIFICANT CHANGE UP (ref 4.2–5.8)
RBC # BLD: 4.73 M/UL — SIGNIFICANT CHANGE UP (ref 4.2–5.8)
RBC # FLD: 12.7 % — SIGNIFICANT CHANGE UP (ref 10.3–14.5)
RBC # FLD: 12.7 % — SIGNIFICANT CHANGE UP (ref 10.3–14.5)
WBC # BLD: 4.92 K/UL — SIGNIFICANT CHANGE UP (ref 3.8–10.5)
WBC # BLD: 4.92 K/UL — SIGNIFICANT CHANGE UP (ref 3.8–10.5)
WBC # FLD AUTO: 4.92 K/UL — SIGNIFICANT CHANGE UP (ref 3.8–10.5)
WBC # FLD AUTO: 4.92 K/UL — SIGNIFICANT CHANGE UP (ref 3.8–10.5)

## 2023-11-15 LAB
CREAT ?TM UR-MCNC: 65 MG/DL — SIGNIFICANT CHANGE UP
CREAT ?TM UR-MCNC: 65 MG/DL — SIGNIFICANT CHANGE UP
PROT ?TM UR-MCNC: 9 MG/DL — SIGNIFICANT CHANGE UP (ref 0–12)
PROT ?TM UR-MCNC: 9 MG/DL — SIGNIFICANT CHANGE UP (ref 0–12)
PROT/CREAT UR-RTO: 0.1 RATIO — SIGNIFICANT CHANGE UP (ref 0–0.2)
PROT/CREAT UR-RTO: 0.1 RATIO — SIGNIFICANT CHANGE UP (ref 0–0.2)

## 2023-12-05 ENCOUNTER — APPOINTMENT (OUTPATIENT)
Age: 58
End: 2023-12-05

## 2023-12-05 ENCOUNTER — RESULT REVIEW (OUTPATIENT)
Age: 58
End: 2023-12-05

## 2023-12-05 LAB
BASOPHILS # BLD AUTO: 0.01 K/UL — SIGNIFICANT CHANGE UP (ref 0–0.2)
BASOPHILS # BLD AUTO: 0.01 K/UL — SIGNIFICANT CHANGE UP (ref 0–0.2)
BASOPHILS NFR BLD AUTO: 0.2 % — SIGNIFICANT CHANGE UP (ref 0–2)
BASOPHILS NFR BLD AUTO: 0.2 % — SIGNIFICANT CHANGE UP (ref 0–2)
EOSINOPHIL # BLD AUTO: 0.07 K/UL — SIGNIFICANT CHANGE UP (ref 0–0.5)
EOSINOPHIL # BLD AUTO: 0.07 K/UL — SIGNIFICANT CHANGE UP (ref 0–0.5)
EOSINOPHIL NFR BLD AUTO: 1.6 % — SIGNIFICANT CHANGE UP (ref 0–6)
EOSINOPHIL NFR BLD AUTO: 1.6 % — SIGNIFICANT CHANGE UP (ref 0–6)
HCT VFR BLD CALC: 39.9 % — SIGNIFICANT CHANGE UP (ref 39–50)
HCT VFR BLD CALC: 39.9 % — SIGNIFICANT CHANGE UP (ref 39–50)
HGB BLD-MCNC: 14.6 G/DL — SIGNIFICANT CHANGE UP (ref 13–17)
HGB BLD-MCNC: 14.6 G/DL — SIGNIFICANT CHANGE UP (ref 13–17)
IMM GRANULOCYTES NFR BLD AUTO: 0.2 % — SIGNIFICANT CHANGE UP (ref 0–0.9)
IMM GRANULOCYTES NFR BLD AUTO: 0.2 % — SIGNIFICANT CHANGE UP (ref 0–0.9)
LYMPHOCYTES # BLD AUTO: 1.47 K/UL — SIGNIFICANT CHANGE UP (ref 1–3.3)
LYMPHOCYTES # BLD AUTO: 1.47 K/UL — SIGNIFICANT CHANGE UP (ref 1–3.3)
LYMPHOCYTES # BLD AUTO: 33.8 % — SIGNIFICANT CHANGE UP (ref 13–44)
LYMPHOCYTES # BLD AUTO: 33.8 % — SIGNIFICANT CHANGE UP (ref 13–44)
MCHC RBC-ENTMCNC: 33.6 PG — SIGNIFICANT CHANGE UP (ref 27–34)
MCHC RBC-ENTMCNC: 33.6 PG — SIGNIFICANT CHANGE UP (ref 27–34)
MCHC RBC-ENTMCNC: 36.6 GM/DL — HIGH (ref 32–36)
MCHC RBC-ENTMCNC: 36.6 GM/DL — HIGH (ref 32–36)
MCV RBC AUTO: 91.7 FL — SIGNIFICANT CHANGE UP (ref 80–100)
MCV RBC AUTO: 91.7 FL — SIGNIFICANT CHANGE UP (ref 80–100)
MONOCYTES # BLD AUTO: 0.43 K/UL — SIGNIFICANT CHANGE UP (ref 0–0.9)
MONOCYTES # BLD AUTO: 0.43 K/UL — SIGNIFICANT CHANGE UP (ref 0–0.9)
MONOCYTES NFR BLD AUTO: 9.9 % — SIGNIFICANT CHANGE UP (ref 2–14)
MONOCYTES NFR BLD AUTO: 9.9 % — SIGNIFICANT CHANGE UP (ref 2–14)
NEUTROPHILS # BLD AUTO: 2.36 K/UL — SIGNIFICANT CHANGE UP (ref 1.8–7.4)
NEUTROPHILS # BLD AUTO: 2.36 K/UL — SIGNIFICANT CHANGE UP (ref 1.8–7.4)
NEUTROPHILS NFR BLD AUTO: 54.3 % — SIGNIFICANT CHANGE UP (ref 43–77)
NEUTROPHILS NFR BLD AUTO: 54.3 % — SIGNIFICANT CHANGE UP (ref 43–77)
NRBC # BLD: 0 /100 WBCS — SIGNIFICANT CHANGE UP (ref 0–0)
NRBC # BLD: 0 /100 WBCS — SIGNIFICANT CHANGE UP (ref 0–0)
PLATELET # BLD AUTO: 116 K/UL — LOW (ref 150–400)
PLATELET # BLD AUTO: 116 K/UL — LOW (ref 150–400)
RBC # BLD: 4.35 M/UL — SIGNIFICANT CHANGE UP (ref 4.2–5.8)
RBC # BLD: 4.35 M/UL — SIGNIFICANT CHANGE UP (ref 4.2–5.8)
RBC # FLD: 14.9 % — HIGH (ref 10.3–14.5)
RBC # FLD: 14.9 % — HIGH (ref 10.3–14.5)
WBC # BLD: 4.35 K/UL — SIGNIFICANT CHANGE UP (ref 3.8–10.5)
WBC # BLD: 4.35 K/UL — SIGNIFICANT CHANGE UP (ref 3.8–10.5)
WBC # FLD AUTO: 4.35 K/UL — SIGNIFICANT CHANGE UP (ref 3.8–10.5)
WBC # FLD AUTO: 4.35 K/UL — SIGNIFICANT CHANGE UP (ref 3.8–10.5)

## 2023-12-06 LAB
CREAT ?TM UR-MCNC: 60 MG/DL — SIGNIFICANT CHANGE UP
CREAT ?TM UR-MCNC: 60 MG/DL — SIGNIFICANT CHANGE UP
PROT ?TM UR-MCNC: 6 MG/DL — SIGNIFICANT CHANGE UP (ref 0–12)
PROT ?TM UR-MCNC: 6 MG/DL — SIGNIFICANT CHANGE UP (ref 0–12)
PROT/CREAT UR-RTO: 0.1 RATIO — SIGNIFICANT CHANGE UP (ref 0–0.2)
PROT/CREAT UR-RTO: 0.1 RATIO — SIGNIFICANT CHANGE UP (ref 0–0.2)

## 2023-12-22 NOTE — H&P PST ADULT - NS PRO FEM  PAP SMEARS 3YRS
Goal Outcome Evaluation:   VSS. Telemetry discontinued. No IV access. Patient alert and oriented with intermittent confusion. Room air. Rested comfortably throughout the day. Calm and cooperative. Awaiting precerts.                        not applicable (Male)

## 2023-12-26 ENCOUNTER — RESULT REVIEW (OUTPATIENT)
Age: 58
End: 2023-12-26

## 2023-12-26 ENCOUNTER — APPOINTMENT (OUTPATIENT)
Age: 58
End: 2023-12-26

## 2023-12-26 LAB
ALBUMIN SERPL ELPH-MCNC: 4.2 G/DL — SIGNIFICANT CHANGE UP (ref 3.3–5)
ALBUMIN SERPL ELPH-MCNC: 4.2 G/DL — SIGNIFICANT CHANGE UP (ref 3.3–5)
ALP SERPL-CCNC: 47 U/L — SIGNIFICANT CHANGE UP (ref 40–120)
ALP SERPL-CCNC: 47 U/L — SIGNIFICANT CHANGE UP (ref 40–120)
ALT FLD-CCNC: 36 U/L — SIGNIFICANT CHANGE UP (ref 10–45)
ALT FLD-CCNC: 36 U/L — SIGNIFICANT CHANGE UP (ref 10–45)
ANION GAP SERPL CALC-SCNC: 10 MMOL/L — SIGNIFICANT CHANGE UP (ref 5–17)
ANION GAP SERPL CALC-SCNC: 10 MMOL/L — SIGNIFICANT CHANGE UP (ref 5–17)
AST SERPL-CCNC: 41 U/L — HIGH (ref 10–40)
AST SERPL-CCNC: 41 U/L — HIGH (ref 10–40)
BASOPHILS # BLD AUTO: 0.02 K/UL — SIGNIFICANT CHANGE UP (ref 0–0.2)
BASOPHILS # BLD AUTO: 0.02 K/UL — SIGNIFICANT CHANGE UP (ref 0–0.2)
BASOPHILS NFR BLD AUTO: 0.4 % — SIGNIFICANT CHANGE UP (ref 0–2)
BASOPHILS NFR BLD AUTO: 0.4 % — SIGNIFICANT CHANGE UP (ref 0–2)
BILIRUB SERPL-MCNC: 1.5 MG/DL — HIGH (ref 0.2–1.2)
BILIRUB SERPL-MCNC: 1.5 MG/DL — HIGH (ref 0.2–1.2)
BUN SERPL-MCNC: 13 MG/DL — SIGNIFICANT CHANGE UP (ref 7–23)
BUN SERPL-MCNC: 13 MG/DL — SIGNIFICANT CHANGE UP (ref 7–23)
CALCIUM SERPL-MCNC: 9.4 MG/DL — SIGNIFICANT CHANGE UP (ref 8.4–10.5)
CALCIUM SERPL-MCNC: 9.4 MG/DL — SIGNIFICANT CHANGE UP (ref 8.4–10.5)
CHLORIDE SERPL-SCNC: 101 MMOL/L — SIGNIFICANT CHANGE UP (ref 96–108)
CHLORIDE SERPL-SCNC: 101 MMOL/L — SIGNIFICANT CHANGE UP (ref 96–108)
CO2 SERPL-SCNC: 24 MMOL/L — SIGNIFICANT CHANGE UP (ref 22–31)
CO2 SERPL-SCNC: 24 MMOL/L — SIGNIFICANT CHANGE UP (ref 22–31)
CREAT SERPL-MCNC: 0.64 MG/DL — SIGNIFICANT CHANGE UP (ref 0.5–1.3)
CREAT SERPL-MCNC: 0.64 MG/DL — SIGNIFICANT CHANGE UP (ref 0.5–1.3)
EGFR: 111 ML/MIN/1.73M2 — SIGNIFICANT CHANGE UP
EGFR: 111 ML/MIN/1.73M2 — SIGNIFICANT CHANGE UP
EOSINOPHIL # BLD AUTO: 0.13 K/UL — SIGNIFICANT CHANGE UP (ref 0–0.5)
EOSINOPHIL # BLD AUTO: 0.13 K/UL — SIGNIFICANT CHANGE UP (ref 0–0.5)
EOSINOPHIL NFR BLD AUTO: 2.4 % — SIGNIFICANT CHANGE UP (ref 0–6)
EOSINOPHIL NFR BLD AUTO: 2.4 % — SIGNIFICANT CHANGE UP (ref 0–6)
GLUCOSE SERPL-MCNC: 152 MG/DL — HIGH (ref 70–99)
GLUCOSE SERPL-MCNC: 152 MG/DL — HIGH (ref 70–99)
HCT VFR BLD CALC: 40.7 % — SIGNIFICANT CHANGE UP (ref 39–50)
HCT VFR BLD CALC: 40.7 % — SIGNIFICANT CHANGE UP (ref 39–50)
HGB BLD-MCNC: 14.9 G/DL — SIGNIFICANT CHANGE UP (ref 13–17)
HGB BLD-MCNC: 14.9 G/DL — SIGNIFICANT CHANGE UP (ref 13–17)
IMM GRANULOCYTES NFR BLD AUTO: 0.4 % — SIGNIFICANT CHANGE UP (ref 0–0.9)
IMM GRANULOCYTES NFR BLD AUTO: 0.4 % — SIGNIFICANT CHANGE UP (ref 0–0.9)
LYMPHOCYTES # BLD AUTO: 1.52 K/UL — SIGNIFICANT CHANGE UP (ref 1–3.3)
LYMPHOCYTES # BLD AUTO: 1.52 K/UL — SIGNIFICANT CHANGE UP (ref 1–3.3)
LYMPHOCYTES # BLD AUTO: 27.7 % — SIGNIFICANT CHANGE UP (ref 13–44)
LYMPHOCYTES # BLD AUTO: 27.7 % — SIGNIFICANT CHANGE UP (ref 13–44)
MCHC RBC-ENTMCNC: 35 PG — HIGH (ref 27–34)
MCHC RBC-ENTMCNC: 35 PG — HIGH (ref 27–34)
MCHC RBC-ENTMCNC: 36.6 GM/DL — HIGH (ref 32–36)
MCHC RBC-ENTMCNC: 36.6 GM/DL — HIGH (ref 32–36)
MCV RBC AUTO: 95.5 FL — SIGNIFICANT CHANGE UP (ref 80–100)
MCV RBC AUTO: 95.5 FL — SIGNIFICANT CHANGE UP (ref 80–100)
MONOCYTES # BLD AUTO: 0.51 K/UL — SIGNIFICANT CHANGE UP (ref 0–0.9)
MONOCYTES # BLD AUTO: 0.51 K/UL — SIGNIFICANT CHANGE UP (ref 0–0.9)
MONOCYTES NFR BLD AUTO: 9.3 % — SIGNIFICANT CHANGE UP (ref 2–14)
MONOCYTES NFR BLD AUTO: 9.3 % — SIGNIFICANT CHANGE UP (ref 2–14)
NEUTROPHILS # BLD AUTO: 3.28 K/UL — SIGNIFICANT CHANGE UP (ref 1.8–7.4)
NEUTROPHILS # BLD AUTO: 3.28 K/UL — SIGNIFICANT CHANGE UP (ref 1.8–7.4)
NEUTROPHILS NFR BLD AUTO: 59.8 % — SIGNIFICANT CHANGE UP (ref 43–77)
NEUTROPHILS NFR BLD AUTO: 59.8 % — SIGNIFICANT CHANGE UP (ref 43–77)
NRBC # BLD: 0 /100 WBCS — SIGNIFICANT CHANGE UP (ref 0–0)
NRBC # BLD: 0 /100 WBCS — SIGNIFICANT CHANGE UP (ref 0–0)
PLATELET # BLD AUTO: 92 K/UL — LOW (ref 150–400)
PLATELET # BLD AUTO: 92 K/UL — LOW (ref 150–400)
POTASSIUM SERPL-MCNC: 4.1 MMOL/L — SIGNIFICANT CHANGE UP (ref 3.5–5.3)
POTASSIUM SERPL-MCNC: 4.1 MMOL/L — SIGNIFICANT CHANGE UP (ref 3.5–5.3)
POTASSIUM SERPL-SCNC: 4.1 MMOL/L — SIGNIFICANT CHANGE UP (ref 3.5–5.3)
POTASSIUM SERPL-SCNC: 4.1 MMOL/L — SIGNIFICANT CHANGE UP (ref 3.5–5.3)
PROT SERPL-MCNC: 6.3 G/DL — SIGNIFICANT CHANGE UP (ref 6–8.3)
PROT SERPL-MCNC: 6.3 G/DL — SIGNIFICANT CHANGE UP (ref 6–8.3)
RBC # BLD: 4.26 M/UL — SIGNIFICANT CHANGE UP (ref 4.2–5.8)
RBC # BLD: 4.26 M/UL — SIGNIFICANT CHANGE UP (ref 4.2–5.8)
RBC # FLD: 17.6 % — HIGH (ref 10.3–14.5)
RBC # FLD: 17.6 % — HIGH (ref 10.3–14.5)
SODIUM SERPL-SCNC: 135 MMOL/L — SIGNIFICANT CHANGE UP (ref 135–145)
SODIUM SERPL-SCNC: 135 MMOL/L — SIGNIFICANT CHANGE UP (ref 135–145)
WBC # BLD: 5.48 K/UL — SIGNIFICANT CHANGE UP (ref 3.8–10.5)
WBC # BLD: 5.48 K/UL — SIGNIFICANT CHANGE UP (ref 3.8–10.5)
WBC # FLD AUTO: 5.48 K/UL — SIGNIFICANT CHANGE UP (ref 3.8–10.5)
WBC # FLD AUTO: 5.48 K/UL — SIGNIFICANT CHANGE UP (ref 3.8–10.5)

## 2023-12-26 PROCEDURE — 84156 ASSAY OF PROTEIN URINE: CPT

## 2023-12-26 PROCEDURE — 96413 CHEMO IV INFUSION 1 HR: CPT

## 2023-12-26 PROCEDURE — 85027 COMPLETE CBC AUTOMATED: CPT

## 2023-12-26 PROCEDURE — 82570 ASSAY OF URINE CREATININE: CPT

## 2023-12-26 PROCEDURE — 80053 COMPREHEN METABOLIC PANEL: CPT

## 2023-12-27 LAB
CREAT ?TM UR-MCNC: 118 MG/DL — SIGNIFICANT CHANGE UP
CREAT ?TM UR-MCNC: 118 MG/DL — SIGNIFICANT CHANGE UP
PROT ?TM UR-MCNC: 12 MG/DL — SIGNIFICANT CHANGE UP (ref 0–12)
PROT ?TM UR-MCNC: 12 MG/DL — SIGNIFICANT CHANGE UP (ref 0–12)
PROT/CREAT UR-RTO: 0.1 RATIO — SIGNIFICANT CHANGE UP (ref 0–0.2)
PROT/CREAT UR-RTO: 0.1 RATIO — SIGNIFICANT CHANGE UP (ref 0–0.2)

## 2024-01-12 ENCOUNTER — OUTPATIENT (OUTPATIENT)
Dept: OUTPATIENT SERVICES | Facility: HOSPITAL | Age: 59
LOS: 1 days | End: 2024-01-12
Payer: COMMERCIAL

## 2024-01-12 DIAGNOSIS — Z51.11 ENCOUNTER FOR ANTINEOPLASTIC CHEMOTHERAPY: ICD-10-CM

## 2024-01-12 DIAGNOSIS — Z90.49 ACQUIRED ABSENCE OF OTHER SPECIFIED PARTS OF DIGESTIVE TRACT: Chronic | ICD-10-CM

## 2024-01-12 DIAGNOSIS — R11.2 NAUSEA WITH VOMITING, UNSPECIFIED: ICD-10-CM

## 2024-01-12 DIAGNOSIS — C18.9 MALIGNANT NEOPLASM OF COLON, UNSPECIFIED: ICD-10-CM

## 2024-01-12 DIAGNOSIS — Z98.890 OTHER SPECIFIED POSTPROCEDURAL STATES: Chronic | ICD-10-CM

## 2024-01-15 PROBLEM — C78.00 METASTATIC MALIGNANT NEOPLASM TO LUNG: Status: ACTIVE | Noted: 2022-09-01

## 2024-01-16 ENCOUNTER — RESULT REVIEW (OUTPATIENT)
Age: 59
End: 2024-01-16

## 2024-01-16 ENCOUNTER — APPOINTMENT (OUTPATIENT)
Age: 59
End: 2024-01-16
Payer: COMMERCIAL

## 2024-01-16 ENCOUNTER — APPOINTMENT (OUTPATIENT)
Age: 59
End: 2024-01-16

## 2024-01-16 VITALS
HEIGHT: 68 IN | HEART RATE: 67 BPM | WEIGHT: 205 LBS | TEMPERATURE: 97 F | RESPIRATION RATE: 16 BRPM | DIASTOLIC BLOOD PRESSURE: 77 MMHG | OXYGEN SATURATION: 98 % | BODY MASS INDEX: 31.07 KG/M2 | SYSTOLIC BLOOD PRESSURE: 126 MMHG

## 2024-01-16 DIAGNOSIS — C78.00 SECONDARY MALIGNANT NEOPLASM OF UNSPECIFIED LUNG: ICD-10-CM

## 2024-01-16 LAB
ALBUMIN SERPL ELPH-MCNC: 4.2 G/DL — SIGNIFICANT CHANGE UP (ref 3.3–5)
ALP SERPL-CCNC: 46 U/L — SIGNIFICANT CHANGE UP (ref 40–120)
ALT FLD-CCNC: 35 U/L — SIGNIFICANT CHANGE UP (ref 10–45)
ANION GAP SERPL CALC-SCNC: 10 MMOL/L — SIGNIFICANT CHANGE UP (ref 5–17)
AST SERPL-CCNC: 39 U/L — SIGNIFICANT CHANGE UP (ref 10–40)
BASOPHILS # BLD AUTO: 0.02 K/UL — SIGNIFICANT CHANGE UP (ref 0–0.2)
BASOPHILS NFR BLD AUTO: 0.4 % — SIGNIFICANT CHANGE UP (ref 0–2)
BILIRUB SERPL-MCNC: 1.5 MG/DL — HIGH (ref 0.2–1.2)
BUN SERPL-MCNC: 13 MG/DL — SIGNIFICANT CHANGE UP (ref 7–23)
CALCIUM SERPL-MCNC: 9.5 MG/DL — SIGNIFICANT CHANGE UP (ref 8.4–10.5)
CEA SERPL-MCNC: 43.2 NG/ML — HIGH (ref 0–3.8)
CHLORIDE SERPL-SCNC: 101 MMOL/L — SIGNIFICANT CHANGE UP (ref 96–108)
CO2 SERPL-SCNC: 26 MMOL/L — SIGNIFICANT CHANGE UP (ref 22–31)
CREAT SERPL-MCNC: 0.62 MG/DL — SIGNIFICANT CHANGE UP (ref 0.5–1.3)
EGFR: 112 ML/MIN/1.73M2 — SIGNIFICANT CHANGE UP
EOSINOPHIL # BLD AUTO: 0.14 K/UL — SIGNIFICANT CHANGE UP (ref 0–0.5)
EOSINOPHIL NFR BLD AUTO: 2.5 % — SIGNIFICANT CHANGE UP (ref 0–6)
GLUCOSE SERPL-MCNC: 184 MG/DL — HIGH (ref 70–99)
HCT VFR BLD CALC: 41.9 % — SIGNIFICANT CHANGE UP (ref 39–50)
HGB BLD-MCNC: 15.3 G/DL — SIGNIFICANT CHANGE UP (ref 13–17)
IMM GRANULOCYTES NFR BLD AUTO: 0.2 % — SIGNIFICANT CHANGE UP (ref 0–0.9)
LYMPHOCYTES # BLD AUTO: 1.73 K/UL — SIGNIFICANT CHANGE UP (ref 1–3.3)
LYMPHOCYTES # BLD AUTO: 30.7 % — SIGNIFICANT CHANGE UP (ref 13–44)
MAGNESIUM SERPL-MCNC: 2 MG/DL — SIGNIFICANT CHANGE UP (ref 1.6–2.6)
MCHC RBC-ENTMCNC: 35.3 PG — HIGH (ref 27–34)
MCHC RBC-ENTMCNC: 36.5 GM/DL — HIGH (ref 32–36)
MCV RBC AUTO: 96.8 FL — SIGNIFICANT CHANGE UP (ref 80–100)
MONOCYTES # BLD AUTO: 0.52 K/UL — SIGNIFICANT CHANGE UP (ref 0–0.9)
MONOCYTES NFR BLD AUTO: 9.2 % — SIGNIFICANT CHANGE UP (ref 2–14)
NEUTROPHILS # BLD AUTO: 3.21 K/UL — SIGNIFICANT CHANGE UP (ref 1.8–7.4)
NEUTROPHILS NFR BLD AUTO: 57 % — SIGNIFICANT CHANGE UP (ref 43–77)
NRBC # BLD: 0 /100 WBCS — SIGNIFICANT CHANGE UP (ref 0–0)
PHOSPHATE SERPL-MCNC: 3 MG/DL — SIGNIFICANT CHANGE UP (ref 2.5–4.5)
PLATELET # BLD AUTO: 113 K/UL — LOW (ref 150–400)
POTASSIUM SERPL-MCNC: 4.2 MMOL/L — SIGNIFICANT CHANGE UP (ref 3.5–5.3)
POTASSIUM SERPL-SCNC: 4.2 MMOL/L — SIGNIFICANT CHANGE UP (ref 3.5–5.3)
PROT SERPL-MCNC: 6.7 G/DL — SIGNIFICANT CHANGE UP (ref 6–8.3)
RBC # BLD: 4.33 M/UL — SIGNIFICANT CHANGE UP (ref 4.2–5.8)
RBC # FLD: 17.2 % — HIGH (ref 10.3–14.5)
SODIUM SERPL-SCNC: 136 MMOL/L — SIGNIFICANT CHANGE UP (ref 135–145)
WBC # BLD: 5.63 K/UL — SIGNIFICANT CHANGE UP (ref 3.8–10.5)
WBC # FLD AUTO: 5.63 K/UL — SIGNIFICANT CHANGE UP (ref 3.8–10.5)

## 2024-01-16 PROCEDURE — 99214 OFFICE O/P EST MOD 30 MIN: CPT

## 2024-01-16 NOTE — RESULTS/DATA
[FreeTextEntry1] : Mr. Mccarthy is a pleasant 58 year-old man with stage IV colon cancer, initially electing a watch and wait approach, now with progression, on XELOX + nicholas. Pt continues to have lower back pain.

## 2024-01-16 NOTE — PHYSICAL EXAM
[Fully active, able to carry on all pre-disease performance without restriction] : Status 0 - Fully active, able to carry on all pre-disease performance without restriction [Normal] : affect appropriate [de-identified] : anicteric [de-identified] : right chest wall mediport c/d/i [de-identified] : 3x3 non blanching macule on left elbow, PPE grade 1 on hands and feet

## 2024-01-16 NOTE — REVIEW OF SYSTEMS
[Recent Change In Weight] : ~T recent weight change [Dysphagia] : no dysphagia [Odynophagia] : no odynophagia [Chest Pain] : no chest pain [Shortness Of Breath] : no shortness of breath [Joint Pain] : no joint pain [Joint Stiffness] : no joint stiffness [Skin Rash] : no skin rash [Easy Bleeding] : no tendency for easy bleeding [Easy Bruising] : no tendency for easy bruising [Swollen Glands] : no swollen glands [FreeTextEntry9] : lower back pain

## 2024-01-16 NOTE — HISTORY OF PRESENT ILLNESS
[de-identified] : Mr. Mccarthy was found to have mild iron deficiency anemia of routine blood work at the end of 2020. Colonoscopy revealed a large tumor in the proximal transverse colon. He had no GI symptoms. A CT of the chest, abdomen and pelvis in January, 2021 revealed no evidence of metastatic disease.  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 There was lymphovascular invasion, with 0 of 14 lymph nodes involved with cancer ([id number], high-risk stage II). Proficient mismatch repair.  He received adjuvant capecitabine x 6 months 3/4/21-end of August 2021 with Dr. Sumner, with minimal toxicity. His CEA was persistently elevated near 7, with multiple negative scans.  ctDNA testing in January, 2022 was unrevealing.  In August, 2022 CT scans showed several enlarging paraaortic lymph nodes (reference node 2.3 x 1.1cm, previously 1.4 x 0.5cm) and two (bilateral) subcentimeter lung nodules.  An IR-guided biopsy of the aortocaval node shortly thereafter confirmed metastatic adenocarcinoma morphologically equivalent to his known colon primary.  Mr. Mccarthy has been back to Dr. Sumner, was offered FOLFOX + bevacizumab. [place] [facility] [place] watchful waiting versus irinotecan. His friend, Norbert Juarezkathie is my patient, and suggested he see me as third opinion.  Found to have stable aortocaval lymph nodes previously noted on the PET scan on CT imaging at the end of October, 2022.    CT scan completed January, 2023 showed increase in size and number of lung metastases. New liver lesions suspicious for metastatic disease. Increase in size of a periceliac lymph node. Other retroperitoneal lymph nodes are stable.  Proceeded to XELOX + bevacizumab.  [de-identified] : Pt reports falling asleep easier on baclofen, but states that he continues to wake up at night from pain. Lower back pain persists, now appx 6 weeks or longer since initial discomfort. OTC and prescription meds have not helped much. Pt is taking Ativan to also help him fall asleep. He is requesting a new medication to help improve his pain. Pt is open to seeing a pain management physician. Pt is open to having a repeat scan in February. Pt also has a new skin finding on his left elbow which was of concern for him. Lastly, pt is reporting having increased PPE and neuropathy in his extremities.

## 2024-01-16 NOTE — END OF VISIT
[FreeTextEntry4] : Documented by Shahram Patiño acting as a scribe for Dr. Ousmane Wilson 01/16/2024   All medical record entries made by the Scribe were at my, Dr. Alcon Wilson, direction and personally dictated by me on 01/16/2024. I have reviewed the chart and agree that the record accurately reflects my personal performance of the history, physical exam, assessment and plan. I have also personally directed, reviewed, and agreed with the chart. [Time Spent: ___ minutes] : I have spent [unfilled] minutes of time on the encounter.

## 2024-01-17 LAB
CREAT ?TM UR-MCNC: 65 MG/DL — SIGNIFICANT CHANGE UP
PROT ?TM UR-MCNC: 12 MG/DL — SIGNIFICANT CHANGE UP (ref 0–12)
PROT/CREAT UR-RTO: 0.2 RATIO — SIGNIFICANT CHANGE UP (ref 0–0.2)

## 2024-01-20 ENCOUNTER — RESULT REVIEW (OUTPATIENT)
Age: 59
End: 2024-01-20

## 2024-02-06 ENCOUNTER — APPOINTMENT (OUTPATIENT)
Age: 59
End: 2024-02-06

## 2024-02-06 ENCOUNTER — APPOINTMENT (OUTPATIENT)
Age: 59
End: 2024-02-06
Payer: COMMERCIAL

## 2024-02-06 ENCOUNTER — RESULT REVIEW (OUTPATIENT)
Age: 59
End: 2024-02-06

## 2024-02-06 VITALS
OXYGEN SATURATION: 97 % | WEIGHT: 205.38 LBS | SYSTOLIC BLOOD PRESSURE: 132 MMHG | HEART RATE: 82 BPM | DIASTOLIC BLOOD PRESSURE: 77 MMHG | TEMPERATURE: 97.4 F | HEIGHT: 68 IN | RESPIRATION RATE: 16 BRPM | BODY MASS INDEX: 31.13 KG/M2

## 2024-02-06 DIAGNOSIS — L98.8 OTHER SPECIFIED DISORDERS OF THE SKIN AND SUBCUTANEOUS TISSUE: ICD-10-CM

## 2024-02-06 DIAGNOSIS — M54.50 LOW BACK PAIN, UNSPECIFIED: ICD-10-CM

## 2024-02-06 LAB
ALBUMIN SERPL ELPH-MCNC: 4.3 G/DL — SIGNIFICANT CHANGE UP (ref 3.3–5)
ALP SERPL-CCNC: 49 U/L — SIGNIFICANT CHANGE UP (ref 40–120)
ALT FLD-CCNC: 33 U/L — SIGNIFICANT CHANGE UP (ref 10–45)
ANION GAP SERPL CALC-SCNC: 10 MMOL/L — SIGNIFICANT CHANGE UP (ref 5–17)
AST SERPL-CCNC: 34 U/L — SIGNIFICANT CHANGE UP (ref 10–40)
BASOPHILS # BLD AUTO: 0.02 K/UL — SIGNIFICANT CHANGE UP (ref 0–0.2)
BASOPHILS NFR BLD AUTO: 0.4 % — SIGNIFICANT CHANGE UP (ref 0–2)
BILIRUB SERPL-MCNC: 1.9 MG/DL — HIGH (ref 0.2–1.2)
BUN SERPL-MCNC: 14 MG/DL — SIGNIFICANT CHANGE UP (ref 7–23)
CALCIUM SERPL-MCNC: 9.9 MG/DL — SIGNIFICANT CHANGE UP (ref 8.4–10.5)
CHLORIDE SERPL-SCNC: 98 MMOL/L — SIGNIFICANT CHANGE UP (ref 96–108)
CO2 SERPL-SCNC: 27 MMOL/L — SIGNIFICANT CHANGE UP (ref 22–31)
CREAT SERPL-MCNC: 0.68 MG/DL — SIGNIFICANT CHANGE UP (ref 0.5–1.3)
EGFR: 109 ML/MIN/1.73M2 — SIGNIFICANT CHANGE UP
EOSINOPHIL # BLD AUTO: 0.11 K/UL — SIGNIFICANT CHANGE UP (ref 0–0.5)
EOSINOPHIL NFR BLD AUTO: 2.2 % — SIGNIFICANT CHANGE UP (ref 0–6)
GLUCOSE SERPL-MCNC: 244 MG/DL — HIGH (ref 70–99)
HCT VFR BLD CALC: 42.5 % — SIGNIFICANT CHANGE UP (ref 39–50)
HGB BLD-MCNC: 15.3 G/DL — SIGNIFICANT CHANGE UP (ref 13–17)
IMM GRANULOCYTES NFR BLD AUTO: 0.2 % — SIGNIFICANT CHANGE UP (ref 0–0.9)
LYMPHOCYTES # BLD AUTO: 1.58 K/UL — SIGNIFICANT CHANGE UP (ref 1–3.3)
LYMPHOCYTES # BLD AUTO: 31.4 % — SIGNIFICANT CHANGE UP (ref 13–44)
MAGNESIUM SERPL-MCNC: 2 MG/DL — SIGNIFICANT CHANGE UP (ref 1.6–2.6)
MCHC RBC-ENTMCNC: 35.8 PG — HIGH (ref 27–34)
MCHC RBC-ENTMCNC: 36 GM/DL — SIGNIFICANT CHANGE UP (ref 32–36)
MCV RBC AUTO: 99.5 FL — SIGNIFICANT CHANGE UP (ref 80–100)
MONOCYTES # BLD AUTO: 0.57 K/UL — SIGNIFICANT CHANGE UP (ref 0–0.9)
MONOCYTES NFR BLD AUTO: 11.3 % — SIGNIFICANT CHANGE UP (ref 2–14)
NEUTROPHILS # BLD AUTO: 2.74 K/UL — SIGNIFICANT CHANGE UP (ref 1.8–7.4)
NEUTROPHILS NFR BLD AUTO: 54.5 % — SIGNIFICANT CHANGE UP (ref 43–77)
NRBC # BLD: 0 /100 WBCS — SIGNIFICANT CHANGE UP (ref 0–0)
PHOSPHATE SERPL-MCNC: 3.7 MG/DL — SIGNIFICANT CHANGE UP (ref 2.5–4.5)
PLATELET # BLD AUTO: 117 K/UL — LOW (ref 150–400)
POTASSIUM SERPL-MCNC: 4.3 MMOL/L — SIGNIFICANT CHANGE UP (ref 3.5–5.3)
POTASSIUM SERPL-SCNC: 4.3 MMOL/L — SIGNIFICANT CHANGE UP (ref 3.5–5.3)
PROT SERPL-MCNC: 6.4 G/DL — SIGNIFICANT CHANGE UP (ref 6–8.3)
RBC # BLD: 4.27 M/UL — SIGNIFICANT CHANGE UP (ref 4.2–5.8)
RBC # FLD: 16.9 % — HIGH (ref 10.3–14.5)
SODIUM SERPL-SCNC: 135 MMOL/L — SIGNIFICANT CHANGE UP (ref 135–145)
WBC # BLD: 5.03 K/UL — SIGNIFICANT CHANGE UP (ref 3.8–10.5)
WBC # FLD AUTO: 5.03 K/UL — SIGNIFICANT CHANGE UP (ref 3.8–10.5)

## 2024-02-06 PROCEDURE — 99215 OFFICE O/P EST HI 40 MIN: CPT

## 2024-02-06 NOTE — HISTORY OF PRESENT ILLNESS
[de-identified] : Mr. Mccarthy was found to have mild iron deficiency anemia of routine blood work at the end of 2020. Colonoscopy revealed a large tumor in the proximal transverse colon. He had no GI symptoms. A CT of the chest, abdomen and pelvis in January, 2021 revealed no evidence of metastatic disease.  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 There was lymphovascular invasion, with 0 of 14 lymph nodes involved with cancer ([id number], high-risk stage II). Proficient mismatch repair.  He received adjuvant capecitabine x 6 months 3/4/21-end of August 2021 with Dr. Sumner, with minimal toxicity. His CEA was persistently elevated near 7, with multiple negative scans.  ctDNA testing in January, 2022 was unrevealing.  In August, 2022 CT scans showed several enlarging paraaortic lymph nodes (reference node 2.3 x 1.1cm, previously 1.4 x 0.5cm) and two (bilateral) subcentimeter lung nodules.  An IR-guided biopsy of the aortocaval node shortly thereafter confirmed metastatic adenocarcinoma morphologically equivalent to his known colon primary.  Mr. Mccarthy has been back to Dr. Sumner, was offered FOLFOX + bevacizumab. He has also seen doctors from [facility] [place], who offered him watchful waiting versus irinotecan. His friend, Norbert Juarezkathei is my patient, and suggested he see me as third opinion.  Found to have stable aortocaval lymph nodes previously noted on the PET scan on CT imaging at the end of October, 2022.    CT scan completed January, 2023 showed increase in size and number of lung metastases. New liver lesions suspicious for metastatic disease. Increase in size of a periceliac lymph node. Other retroperitoneal lymph nodes are stable.  Proceeded to XELOX + bevacizumab.  [de-identified] : Started meloxicam with local spine doctor. [place] pain was due to arthritis. Pain has improved. He denies any ongoing fevers or chills, no headache, no lumps or bumps, no weight loss, no bleeding or bruising.  Noting worsening PPE on hands. Some cracking of skin, responsive to bacitracin and bandages.   Elbow skin abnormality was biopsied by his PMD. Results still pending.

## 2024-02-06 NOTE — REVIEW OF SYSTEMS
[Recent Change In Weight] : ~T no recent weight change [Dysphagia] : no dysphagia [Odynophagia] : no odynophagia [Chest Pain] : no chest pain [Shortness Of Breath] : no shortness of breath [Joint Pain] : no joint pain [Joint Stiffness] : no joint stiffness [Skin Rash] : no skin rash [Easy Bleeding] : no tendency for easy bleeding [Easy Bruising] : no tendency for easy bruising [Swollen Glands] : no swollen glands

## 2024-02-06 NOTE — PHYSICAL EXAM
[Fully active, able to carry on all pre-disease performance without restriction] : Status 0 - Fully active, able to carry on all pre-disease performance without restriction [Normal] : affect appropriate [de-identified] : anicteric [de-identified] : right chest wall mediport c/d/i [de-identified] : 3x3 non blanching macule on left elbow, PPE grade 2 on hands

## 2024-02-06 NOTE — CONSULT LETTER
[Dear  ___] : Dear  [unfilled], [Courtesy Letter:] : I had the pleasure of seeing your patient, [unfilled], in my office today. [Please see my note below.] : Please see my note below. [Sincerely,] : Sincerely, [FreeTextEntry2] : Dr. Evaristo Lizama [FreeTextEntry3] : Ousmane Wilson MD

## 2024-02-07 LAB
CREAT ?TM UR-MCNC: 99 MG/DL — SIGNIFICANT CHANGE UP
PROT ?TM UR-MCNC: 32 MG/DL — HIGH (ref 0–12)
PROT/CREAT UR-RTO: 0.3 RATIO — HIGH (ref 0–0.2)

## 2024-02-20 ENCOUNTER — APPOINTMENT (OUTPATIENT)
Dept: CT IMAGING | Facility: CLINIC | Age: 59
End: 2024-02-20
Payer: COMMERCIAL

## 2024-02-20 ENCOUNTER — OUTPATIENT (OUTPATIENT)
Dept: OUTPATIENT SERVICES | Facility: HOSPITAL | Age: 59
LOS: 1 days | End: 2024-02-20
Payer: COMMERCIAL

## 2024-02-20 DIAGNOSIS — Z90.49 ACQUIRED ABSENCE OF OTHER SPECIFIED PARTS OF DIGESTIVE TRACT: Chronic | ICD-10-CM

## 2024-02-20 DIAGNOSIS — C18.9 MALIGNANT NEOPLASM OF COLON, UNSPECIFIED: ICD-10-CM

## 2024-02-20 DIAGNOSIS — Z98.890 OTHER SPECIFIED POSTPROCEDURAL STATES: Chronic | ICD-10-CM

## 2024-02-20 DIAGNOSIS — C78.00 SECONDARY MALIGNANT NEOPLASM OF UNSPECIFIED LUNG: ICD-10-CM

## 2024-02-20 PROCEDURE — 71260 CT THORAX DX C+: CPT

## 2024-02-20 PROCEDURE — 71260 CT THORAX DX C+: CPT | Mod: 26

## 2024-02-20 PROCEDURE — 74177 CT ABD & PELVIS W/CONTRAST: CPT | Mod: 26

## 2024-02-20 PROCEDURE — 74177 CT ABD & PELVIS W/CONTRAST: CPT

## 2024-02-21 ENCOUNTER — NON-APPOINTMENT (OUTPATIENT)
Age: 59
End: 2024-02-21

## 2024-02-27 ENCOUNTER — RESULT REVIEW (OUTPATIENT)
Age: 59
End: 2024-02-27

## 2024-02-27 ENCOUNTER — APPOINTMENT (OUTPATIENT)
Age: 59
End: 2024-02-27

## 2024-02-27 VITALS
BODY MASS INDEX: 30.8 KG/M2 | WEIGHT: 203.25 LBS | OXYGEN SATURATION: 98 % | SYSTOLIC BLOOD PRESSURE: 123 MMHG | TEMPERATURE: 97.3 F | DIASTOLIC BLOOD PRESSURE: 73 MMHG | HEIGHT: 68 IN | RESPIRATION RATE: 16 BRPM | HEART RATE: 89 BPM

## 2024-02-27 LAB
BASOPHILS # BLD AUTO: 0.02 K/UL — SIGNIFICANT CHANGE UP (ref 0–0.2)
BASOPHILS NFR BLD AUTO: 0.4 % — SIGNIFICANT CHANGE UP (ref 0–2)
EOSINOPHIL # BLD AUTO: 0.1 K/UL — SIGNIFICANT CHANGE UP (ref 0–0.5)
EOSINOPHIL NFR BLD AUTO: 2 % — SIGNIFICANT CHANGE UP (ref 0–6)
HCT VFR BLD CALC: 40.5 % — SIGNIFICANT CHANGE UP (ref 39–50)
HGB BLD-MCNC: 15 G/DL — SIGNIFICANT CHANGE UP (ref 13–17)
IMM GRANULOCYTES NFR BLD AUTO: 0.2 % — SIGNIFICANT CHANGE UP (ref 0–0.9)
LYMPHOCYTES # BLD AUTO: 1.72 K/UL — SIGNIFICANT CHANGE UP (ref 1–3.3)
LYMPHOCYTES # BLD AUTO: 35.2 % — SIGNIFICANT CHANGE UP (ref 13–44)
MCHC RBC-ENTMCNC: 37 GM/DL — HIGH (ref 32–36)
MCHC RBC-ENTMCNC: 37.3 PG — HIGH (ref 27–34)
MCV RBC AUTO: 100.7 FL — HIGH (ref 80–100)
MONOCYTES # BLD AUTO: 0.52 K/UL — SIGNIFICANT CHANGE UP (ref 0–0.9)
MONOCYTES NFR BLD AUTO: 10.6 % — SIGNIFICANT CHANGE UP (ref 2–14)
NEUTROPHILS # BLD AUTO: 2.52 K/UL — SIGNIFICANT CHANGE UP (ref 1.8–7.4)
NEUTROPHILS NFR BLD AUTO: 51.6 % — SIGNIFICANT CHANGE UP (ref 43–77)
NRBC # BLD: 0 /100 WBCS — SIGNIFICANT CHANGE UP (ref 0–0)
PLATELET # BLD AUTO: 102 K/UL — LOW (ref 150–400)
RBC # BLD: 4.02 M/UL — LOW (ref 4.2–5.8)
RBC # FLD: 15.6 % — HIGH (ref 10.3–14.5)
WBC # BLD: 4.89 K/UL — SIGNIFICANT CHANGE UP (ref 3.8–10.5)
WBC # FLD AUTO: 4.89 K/UL — SIGNIFICANT CHANGE UP (ref 3.8–10.5)

## 2024-02-27 PROCEDURE — 96413 CHEMO IV INFUSION 1 HR: CPT

## 2024-02-27 PROCEDURE — 85027 COMPLETE CBC AUTOMATED: CPT

## 2024-02-27 PROCEDURE — 82378 CARCINOEMBRYONIC ANTIGEN: CPT

## 2024-02-27 PROCEDURE — 82570 ASSAY OF URINE CREATININE: CPT

## 2024-02-27 PROCEDURE — 83735 ASSAY OF MAGNESIUM: CPT

## 2024-02-27 PROCEDURE — 80053 COMPREHEN METABOLIC PANEL: CPT

## 2024-02-27 PROCEDURE — 84156 ASSAY OF PROTEIN URINE: CPT

## 2024-02-27 PROCEDURE — 84100 ASSAY OF PHOSPHORUS: CPT

## 2024-02-28 ENCOUNTER — OUTPATIENT (OUTPATIENT)
Dept: OUTPATIENT SERVICES | Facility: HOSPITAL | Age: 59
LOS: 1 days | Discharge: HOME | End: 2024-02-28
Payer: COMMERCIAL

## 2024-02-28 DIAGNOSIS — Z98.890 OTHER SPECIFIED POSTPROCEDURAL STATES: Chronic | ICD-10-CM

## 2024-02-28 DIAGNOSIS — C18.9 MALIGNANT NEOPLASM OF COLON, UNSPECIFIED: ICD-10-CM

## 2024-02-28 LAB
ALBUMIN SERPL ELPH-MCNC: 3.9 G/DL — SIGNIFICANT CHANGE UP (ref 3.3–5)
ALP SERPL-CCNC: 47 U/L — SIGNIFICANT CHANGE UP (ref 40–120)
ALT FLD-CCNC: 30 U/L — SIGNIFICANT CHANGE UP (ref 10–45)
ANION GAP SERPL CALC-SCNC: 11 MMOL/L — SIGNIFICANT CHANGE UP (ref 5–17)
AST SERPL-CCNC: 34 U/L — SIGNIFICANT CHANGE UP (ref 10–40)
BILIRUB SERPL-MCNC: 2.8 MG/DL — HIGH (ref 0.2–1.2)
BUN SERPL-MCNC: 11 MG/DL — SIGNIFICANT CHANGE UP (ref 7–23)
CALCIUM SERPL-MCNC: 9.8 MG/DL — SIGNIFICANT CHANGE UP (ref 8.4–10.5)
CEA SERPL-MCNC: 57.6 NG/ML — HIGH (ref 0–3.8)
CHLORIDE SERPL-SCNC: 100 MMOL/L — SIGNIFICANT CHANGE UP (ref 96–108)
CO2 SERPL-SCNC: 25 MMOL/L — SIGNIFICANT CHANGE UP (ref 22–31)
CREAT ?TM UR-MCNC: 137 MG/DL — SIGNIFICANT CHANGE UP
CREAT SERPL-MCNC: 0.69 MG/DL — SIGNIFICANT CHANGE UP (ref 0.5–1.3)
EGFR: 109 ML/MIN/1.73M2 — SIGNIFICANT CHANGE UP
GLUCOSE SERPL-MCNC: 221 MG/DL — HIGH (ref 70–99)
MAGNESIUM SERPL-MCNC: 2 MG/DL — SIGNIFICANT CHANGE UP (ref 1.6–2.6)
PHOSPHATE SERPL-MCNC: 3 MG/DL — SIGNIFICANT CHANGE UP (ref 2.5–4.5)
POTASSIUM SERPL-MCNC: 4 MMOL/L — SIGNIFICANT CHANGE UP (ref 3.5–5.3)
POTASSIUM SERPL-SCNC: 4 MMOL/L — SIGNIFICANT CHANGE UP (ref 3.5–5.3)
PROT ?TM UR-MCNC: 8 MG/DL — SIGNIFICANT CHANGE UP (ref 0–12)
PROT SERPL-MCNC: 6.7 G/DL — SIGNIFICANT CHANGE UP (ref 6–8.3)
PROT/CREAT UR-RTO: 0.1 RATIO — SIGNIFICANT CHANGE UP (ref 0–0.2)
SODIUM SERPL-SCNC: 135 MMOL/L — SIGNIFICANT CHANGE UP (ref 135–145)

## 2024-02-29 ENCOUNTER — APPOINTMENT (OUTPATIENT)
Dept: ULTRASOUND IMAGING | Facility: CLINIC | Age: 59
End: 2024-02-29
Payer: COMMERCIAL

## 2024-02-29 ENCOUNTER — LABORATORY RESULT (OUTPATIENT)
Age: 59
End: 2024-02-29

## 2024-02-29 ENCOUNTER — RESULT REVIEW (OUTPATIENT)
Age: 59
End: 2024-02-29

## 2024-02-29 ENCOUNTER — OUTPATIENT (OUTPATIENT)
Dept: OUTPATIENT SERVICES | Facility: HOSPITAL | Age: 59
LOS: 1 days | End: 2024-02-29
Payer: COMMERCIAL

## 2024-02-29 ENCOUNTER — APPOINTMENT (OUTPATIENT)
Dept: HEMATOLOGY ONCOLOGY | Facility: CLINIC | Age: 59
End: 2024-02-29
Payer: COMMERCIAL

## 2024-02-29 VITALS
SYSTOLIC BLOOD PRESSURE: 121 MMHG | RESPIRATION RATE: 16 BRPM | TEMPERATURE: 98.3 F | HEART RATE: 98 BPM | HEIGHT: 68 IN | OXYGEN SATURATION: 98 % | BODY MASS INDEX: 30.57 KG/M2 | DIASTOLIC BLOOD PRESSURE: 81 MMHG | WEIGHT: 201.72 LBS

## 2024-02-29 DIAGNOSIS — C18.9 MALIGNANT NEOPLASM OF COLON, UNSPECIFIED: ICD-10-CM

## 2024-02-29 DIAGNOSIS — Z90.49 ACQUIRED ABSENCE OF OTHER SPECIFIED PARTS OF DIGESTIVE TRACT: Chronic | ICD-10-CM

## 2024-02-29 DIAGNOSIS — Z98.890 OTHER SPECIFIED POSTPROCEDURAL STATES: Chronic | ICD-10-CM

## 2024-02-29 LAB
BASOPHILS # BLD AUTO: 0.04 K/UL — SIGNIFICANT CHANGE UP (ref 0–0.2)
BASOPHILS NFR BLD AUTO: 0.7 % — SIGNIFICANT CHANGE UP (ref 0–2)
EOSINOPHIL # BLD AUTO: 0.09 K/UL — SIGNIFICANT CHANGE UP (ref 0–0.5)
EOSINOPHIL NFR BLD AUTO: 1.7 % — SIGNIFICANT CHANGE UP (ref 0–6)
HCT VFR BLD CALC: 45.7 % — SIGNIFICANT CHANGE UP (ref 39–50)
HGB BLD-MCNC: 16.6 G/DL — SIGNIFICANT CHANGE UP (ref 13–17)
IMM GRANULOCYTES NFR BLD AUTO: 0.2 % — SIGNIFICANT CHANGE UP (ref 0–0.9)
LYMPHOCYTES # BLD AUTO: 1.8 K/UL — SIGNIFICANT CHANGE UP (ref 1–3.3)
LYMPHOCYTES # BLD AUTO: 33.1 % — SIGNIFICANT CHANGE UP (ref 13–44)
MCHC RBC-ENTMCNC: 36.3 G/DL — HIGH (ref 32–36)
MCHC RBC-ENTMCNC: 36.7 PG — HIGH (ref 27–34)
MCV RBC AUTO: 101.1 FL — HIGH (ref 80–100)
MONOCYTES # BLD AUTO: 0.52 K/UL — SIGNIFICANT CHANGE UP (ref 0–0.9)
MONOCYTES NFR BLD AUTO: 9.6 % — SIGNIFICANT CHANGE UP (ref 2–14)
NEUTROPHILS # BLD AUTO: 2.98 K/UL — SIGNIFICANT CHANGE UP (ref 1.8–7.4)
NEUTROPHILS NFR BLD AUTO: 54.7 % — SIGNIFICANT CHANGE UP (ref 43–77)
NRBC # BLD: 0 /100 WBCS — SIGNIFICANT CHANGE UP (ref 0–0)
PLATELET # BLD AUTO: 121 K/UL — LOW (ref 150–400)
RBC # BLD: 4.52 M/UL — SIGNIFICANT CHANGE UP (ref 4.2–5.8)
RBC # FLD: 15.6 % — HIGH (ref 10.3–14.5)
WBC # BLD: 5.44 K/UL — SIGNIFICANT CHANGE UP (ref 3.8–10.5)
WBC # FLD AUTO: 5.44 K/UL — SIGNIFICANT CHANGE UP (ref 3.8–10.5)

## 2024-02-29 PROCEDURE — 99417 PROLNG OP E/M EACH 15 MIN: CPT

## 2024-02-29 PROCEDURE — 76700 US EXAM ABDOM COMPLETE: CPT

## 2024-02-29 PROCEDURE — G2211 COMPLEX E/M VISIT ADD ON: CPT

## 2024-02-29 PROCEDURE — 99215 OFFICE O/P EST HI 40 MIN: CPT

## 2024-02-29 PROCEDURE — 76700 US EXAM ABDOM COMPLETE: CPT | Mod: 26

## 2024-02-29 RX ORDER — TRAMADOL HYDROCHLORIDE 50 MG/1
50 TABLET, COATED ORAL
Qty: 30 | Refills: 0 | Status: DISCONTINUED | COMMUNITY
Start: 2024-01-16 | End: 2024-02-29

## 2024-02-29 RX ORDER — LISINOPRIL 40 MG/1
40 TABLET ORAL DAILY
Qty: 30 | Refills: 10 | Status: ACTIVE | COMMUNITY
Start: 2021-02-10

## 2024-02-29 RX ORDER — ROSUVASTATIN CALCIUM 20 MG/1
20 TABLET, FILM COATED ORAL DAILY
Refills: 0 | Status: ACTIVE | COMMUNITY
Start: 2021-02-10

## 2024-02-29 RX ORDER — ELECTROLYTES/DEXTROSE
SOLUTION, ORAL ORAL
Refills: 0 | Status: DISCONTINUED | COMMUNITY
Start: 2021-02-23 | End: 2024-02-29

## 2024-02-29 RX ORDER — BACLOFEN 10 MG/1
10 TABLET ORAL 3 TIMES DAILY
Qty: 60 | Refills: 3 | Status: DISCONTINUED | COMMUNITY
Start: 2023-09-14 | End: 2024-02-29

## 2024-02-29 RX ORDER — HYDROCHLOROTHIAZIDE 25 MG/1
25 TABLET ORAL
Qty: 90 | Refills: 1 | Status: ACTIVE | COMMUNITY
Start: 2024-02-29

## 2024-02-29 NOTE — HISTORY OF PRESENT ILLNESS
[de-identified] : Mr. Mccarthy was found to have mild iron deficiency anemia of routine blood work at the end of 2020. Colonoscopy revealed a large tumor in the proximal transverse colon. He had no GI symptoms. A CT of the chest, abdomen and pelvis in January, 2021 revealed no evidence of metastatic disease.  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 There was lymphovascular invasion, with 0 of 14 lymph nodes involved with cancer ([id number], high-risk stage II). Proficient mismatch repair.  He received adjuvant capecitabine x 6 months 3/4/21-end of August 2021 with Dr. Lula Sumner, with minimal toxicity. His CEA was persistently elevated near 7, with multiple negative scans.  ctDNA testing in January, 2022 was unrevealing.  In August, 2022 CT scans showed several enlarging paraaortic lymph nodes (reference node 2.3 x 1.1cm, previously 1.4 x 0.5cm) and two (bilateral) subcentimeter lung nodules. LYMPH NODE BIOPSY AND CYTOLOGIC PREPARATION (ABDOMINAL AORTOCAVAL LYMPH NODE): - METASTATIC ADENOCARCINOMA WITH NECROSIS; MORPHOLOGICALLY CONSISTENT WITH PATIENT'S PREVIOUSLY DIAGNOSED COLONIC ADENOCARCINOMA.  Mr. Mccarthy has been back to Dr. Sumner, was offered FOLFOX + bevacizumab. [place] [facility] [place] watchful waiting versus irinotecan. His friend, Norbert Borjas is my patient, and suggested he see me as third opinion. Found to have stable aortocaval lymph nodes previously noted on the PET scan on CT imaging at the end of October, 2022.  An IR-guided biopsy of the aortocaval node shortly thereafter confirmed metastatic adenocarcinoma morphologically equivalent to his known colon primary. CT scan completed January, 2023 showed increase in size and number of lung metastases. New liver lesions suspicious for metastatic disease. Increase in size of a periceliac lymph node. Other retroperitoneal lymph nodes are stable.  Proceeded to XELOX + bevacizumab.  2/20/24 CAT Scan CAP COMPARISON: CT chest/abdomen/pelvis 10/31/2023 and 7/24/2023. LUNGS AND LARGE AIRWAYS: Patent central airways. Bilateral pulmonary nodules, many of which are cavitary, increased in size compared to prior. Reference: Right upper lobe nodule (3-49), 1.6 x 1.4 cm, previously 1.3 x 1.2 cm. Right upper lobe nodule (3-43), 1.5 cm, previously 1.1 cm, with increased cavitation. Left lower lobe nodule (3-24), 1.1 cm, previously 0.9 cm. PLEURA: No pleural effusion. VESSELS: Atherosclerotic changes of the coronary arteries. HEART: Heart size is normal. No pericardial effusion. MEDIASTINUM AND SONIA: Prominent right hilar lymph node (2-65), 1.1 x 1.1 cm, previously 1.4 x 1.1 cm. CHEST WALL AND LOWER NECK: Right anterior chest wall infusion port with tip in the SVC. LIVER: Caudate lobe hypoattenuating lesion (3-135), 1.5 x 1.2 cm, previously 1.6 x 1.1 cm. BILE DUCTS: Normal caliber. GALLBLADDER: Cholecystectomy. SPLEEN: Within normal limits. PANCREAS: Within normal limits. ADRENALS: Within normal limits. KIDNEYS/URETERS: Bilateral symmetric enhancement. No hydronephrosis. Punctate nonobstructing left renal interpole calculus. BLADDER: Minimally distended. REPRODUCTIVE ORGANS: Prostate within normal limits. BOWEL: Small hiatal hernia. Right hemicolectomy. No bowel obstruction. PERITONEUM: No ascites. Cystic lesion adjacent to the 4th portion of the duodenum (3-181), 1.9 x 1.5 cm, may represent lymphangioma or duplication cyst. VESSELS: Atherosclerotic changes. Left hepatic artery arising from the left gastric artery, anatomic variant. RETROPERITONEUM/LYMPH NODES: Increased retroperitoneal soft tissue with encasement of the the celiac axis, proximal common hepatic and left gastric arteries.  Partially calcified left para-aortic node (3-146), 2.1 x 1 cm, stable. ABDOMINAL WALL: Postsurgical changes. Tiny fat-containing umbilical hernia. BONES: Degenerative changes. IMPRESSION: Bilateral pulmonary nodules, many of which are cavitary, increased in size compared to prior 10/31/2023. Increased retroperitoneal soft tissue with encasement of the celiac axis, proximal common hepatic and left gastric arteries. Stable hepatic lesion.  Review of labs from 2/27/2024: CEA = 57.6, T bili = 2.8, Gluc = 221. Xeloda 8 pills 2 weeks on / 1 week off; started 11/16/2023 and last dose 2/29/24 AM. Avastin q 3 weeks, last dose = 2/27/24.       [de-identified] : Started meloxicam with local spine doctor. [place] pain was due to arthritis. Pain has improved. He denies any ongoing fevers or chills, no headache, no lumps or bumps, no weight loss, no bleeding or bruising.  Noting worsening PPE on hands. Some cracking of skin, responsive to bacitracin and bandages.   Elbow skin abnormality was biopsied by his PMD. Results still pending.

## 2024-02-29 NOTE — PHYSICAL EXAM
[Fully active, able to carry on all pre-disease performance without restriction] : Status 0 - Fully active, able to carry on all pre-disease performance without restriction [Normal] : affect appropriate [de-identified] : anicteric [de-identified] : right chest wall mediport c/d/i [de-identified] : 3x3 non blanching macule on left elbow, PPE grade 2 on hands

## 2024-02-29 NOTE — CONSULT LETTER
[Courtesy Letter:] : I had the pleasure of seeing your patient, [unfilled], in my office today. [Dear  ___] : Dear  [unfilled], [Please see my note below.] : Please see my note below. [Sincerely,] : Sincerely, [FreeTextEntry2] : Dr. Evaristo Lizama [FreeTextEntry3] : Ousmane Wilson MD

## 2024-02-29 NOTE — ASSESSMENT
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 There was lymphovascular invasion, with 0 of 14 lymph nodes involved with cancer ([id number], high-risk stage II). Proficient mismatch repair.   He received adjuvant capecitabine x 6 months 3/4/21-end of August 2021.  Genetic testing MyRisk = negative.  CAT scans 8/2023 showed portal nodes, and IR biospy on 8/22/22 is positive for relapsed metastatic adenocarcinoma. F1CDx pan-LEONARD wild type, TMB = 4, TPS = 0%.  XELOX + bevacizumab started 2/7/2023. oxaliplatin 130 mg/m2 + bevacizumab 10 mg/kg + capecitabine q3 week cycles. He has 8 cycles, the last of which was 7/5/2023.  He was then on maintenance therapy with Avastin (last dose 2/27/24) + Xeloda (last 2/29/24 AM)  Plan: He will travel for family vacation cruise 3/7/2024 to 3/17/2024. Stop Xeloda for now in part for elevated bilirubin. Check UGT1A1 and DPYD and other labs today. Will obtain US of liver to check for obstruction. We reviewed FOLFIRI + nicholas + DKN01 as per the [place] trial, and patient is interested to participate.  RTO 3/19/24 for trial consent and start screening process. Much is dependent on bilirubin at present.  Time = 90 min

## 2024-03-01 LAB
ALBUMIN SERPL ELPH-MCNC: 4.6 G/DL
ALP BLD-CCNC: 53 U/L
ALT SERPL-CCNC: 36 U/L
ANION GAP SERPL CALC-SCNC: 13 MMOL/L
APTT BLD: 26.5 SEC
AST SERPL-CCNC: 38 U/L
BILIRUB INDIRECT SERPL-MCNC: 3.2 MG/DL
BILIRUB SERPL-MCNC: 3.4 MG/DL
BUN SERPL-MCNC: 12 MG/DL
CALCIUM SERPL-MCNC: 9.8 MG/DL
CEA SERPL-MCNC: 57.5 NG/ML
CHLORIDE SERPL-SCNC: 98 MMOL/L
CO2 SERPL-SCNC: 26 MMOL/L
CREAT SERPL-MCNC: 0.69 MG/DL
EGFR: 109 ML/MIN/1.73M2
ESTIMATED AVERAGE GLUCOSE: 131 MG/DL
FERRITIN SERPL-MCNC: 806 NG/ML
GGT SERPL-CCNC: 52 U/L
GLUCOSE SERPL-MCNC: 151 MG/DL
HBA1C MFR BLD HPLC: 6.2 %
HBV SURFACE AB SER QL: NONREACTIVE
HBV SURFACE AG SER QL: NONREACTIVE
HCV AB SER QL: NONREACTIVE
HCV S/CO RATIO: 0.09 S/CO
INR PPP: 0.98 RATIO
MAGNESIUM SERPL-MCNC: 2.1 MG/DL
POTASSIUM SERPL-SCNC: 4.1 MMOL/L
PROT SERPL-MCNC: 7.3 G/DL
PT BLD: 11.1 SEC
SODIUM SERPL-SCNC: 136 MMOL/L

## 2024-03-07 LAB
FULL GENE SEQUENCE RESULT: NORMAL
INTERPRETATION PGDFRB: NORMAL
Lab: NORMAL
REF LAB TEST METHOD: NORMAL
REVIEWED BY: NORMAL
TA REPEAT RESULT: NORMAL
TEST PERFORMANCE INFO SPEC: NORMAL

## 2024-03-10 LAB
DPYD GENOTYPE: NORMAL
DPYD PHENOTYPE: NORMAL
DPYD SPECIMEN: NORMAL
PATHOLOGY STUDY: NORMAL

## 2024-03-19 ENCOUNTER — NON-APPOINTMENT (OUTPATIENT)
Age: 59
End: 2024-03-19

## 2024-03-19 ENCOUNTER — APPOINTMENT (OUTPATIENT)
Age: 59
End: 2024-03-19

## 2024-03-19 ENCOUNTER — APPOINTMENT (OUTPATIENT)
Dept: HEMATOLOGY ONCOLOGY | Facility: CLINIC | Age: 59
End: 2024-03-19
Payer: COMMERCIAL

## 2024-03-19 ENCOUNTER — RESULT REVIEW (OUTPATIENT)
Age: 59
End: 2024-03-19

## 2024-03-19 VITALS
HEIGHT: 68 IN | TEMPERATURE: 98 F | BODY MASS INDEX: 30.17 KG/M2 | SYSTOLIC BLOOD PRESSURE: 121 MMHG | DIASTOLIC BLOOD PRESSURE: 80 MMHG | RESPIRATION RATE: 15 BRPM | WEIGHT: 198.41 LBS | OXYGEN SATURATION: 97 % | HEART RATE: 89 BPM

## 2024-03-19 LAB
BASOPHILS # BLD AUTO: 0.04 K/UL — SIGNIFICANT CHANGE UP (ref 0–0.2)
BASOPHILS NFR BLD AUTO: 0.7 % — SIGNIFICANT CHANGE UP (ref 0–2)
EOSINOPHIL # BLD AUTO: 0.12 K/UL — SIGNIFICANT CHANGE UP (ref 0–0.5)
EOSINOPHIL NFR BLD AUTO: 2.2 % — SIGNIFICANT CHANGE UP (ref 0–6)
HCT VFR BLD CALC: 45 % — SIGNIFICANT CHANGE UP (ref 39–50)
HGB BLD-MCNC: 16.4 G/DL — SIGNIFICANT CHANGE UP (ref 13–17)
IMM GRANULOCYTES NFR BLD AUTO: 0.2 % — SIGNIFICANT CHANGE UP (ref 0–0.9)
LYMPHOCYTES # BLD AUTO: 1.57 K/UL — SIGNIFICANT CHANGE UP (ref 1–3.3)
LYMPHOCYTES # BLD AUTO: 28.2 % — SIGNIFICANT CHANGE UP (ref 13–44)
MCHC RBC-ENTMCNC: 35.7 PG — HIGH (ref 27–34)
MCHC RBC-ENTMCNC: 36.4 G/DL — HIGH (ref 32–36)
MCV RBC AUTO: 97.8 FL — SIGNIFICANT CHANGE UP (ref 80–100)
MONOCYTES # BLD AUTO: 0.64 K/UL — SIGNIFICANT CHANGE UP (ref 0–0.9)
MONOCYTES NFR BLD AUTO: 11.5 % — SIGNIFICANT CHANGE UP (ref 2–14)
NEUTROPHILS # BLD AUTO: 3.19 K/UL — SIGNIFICANT CHANGE UP (ref 1.8–7.4)
NEUTROPHILS NFR BLD AUTO: 57.2 % — SIGNIFICANT CHANGE UP (ref 43–77)
NRBC # BLD: 0 /100 WBCS — SIGNIFICANT CHANGE UP (ref 0–0)
PLATELET # BLD AUTO: 115 K/UL — LOW (ref 150–400)
RBC # BLD: 4.6 M/UL — SIGNIFICANT CHANGE UP (ref 4.2–5.8)
RBC # FLD: 13.3 % — SIGNIFICANT CHANGE UP (ref 10.3–14.5)
WBC # BLD: 5.57 K/UL — SIGNIFICANT CHANGE UP (ref 3.8–10.5)
WBC # FLD AUTO: 5.57 K/UL — SIGNIFICANT CHANGE UP (ref 3.8–10.5)

## 2024-03-19 PROCEDURE — G2211 COMPLEX E/M VISIT ADD ON: CPT

## 2024-03-19 PROCEDURE — 93010 ELECTROCARDIOGRAM REPORT: CPT

## 2024-03-19 PROCEDURE — 99215 OFFICE O/P EST HI 40 MIN: CPT

## 2024-03-19 RX ORDER — CAPECITABINE 500 MG/1
500 TABLET ORAL
Qty: 112 | Refills: 5 | Status: DISCONTINUED | COMMUNITY
Start: 2023-02-01 | End: 2024-03-19

## 2024-03-20 ENCOUNTER — RESULT REVIEW (OUTPATIENT)
Age: 59
End: 2024-03-20

## 2024-03-22 LAB
ALBUMIN SERPL ELPH-MCNC: 4.6 G/DL
ALP BLD-CCNC: 54 U/L
ALT SERPL-CCNC: 29 U/L
ANION GAP SERPL CALC-SCNC: 10 MMOL/L
APPEARANCE: CLEAR
AST SERPL-CCNC: 36 U/L
BILIRUB DIRECT SERPL-MCNC: 0.4 MG/DL
BILIRUB SERPL-MCNC: 2.4 MG/DL
BILIRUBIN URINE: NEGATIVE
BLOOD URINE: NEGATIVE
BUN SERPL-MCNC: 14 MG/DL
CALCIUM SERPL-MCNC: 10.5 MG/DL
CANCER AG19-9 SERPL-ACNC: 96 U/ML
CEA SERPL-MCNC: 48.9 NG/ML
CHLORIDE SERPL-SCNC: 99 MMOL/L
CHOLEST SERPL-MCNC: 155 MG/DL
CO2 SERPL-SCNC: 28 MMOL/L
COLOR: YELLOW
CREAT SERPL-MCNC: 0.75 MG/DL
EGFR: 105 ML/MIN/1.73M2
GGT SERPL-CCNC: 66 U/L
GLUCOSE QUALITATIVE U: NEGATIVE MG/DL
GLUCOSE SERPL-MCNC: 133 MG/DL
HDLC SERPL-MCNC: 41 MG/DL
KETONES URINE: NEGATIVE MG/DL
LDLC SERPL CALC-MCNC: 93 MG/DL
LEUKOCYTE ESTERASE URINE: NEGATIVE
NITRITE URINE: NEGATIVE
NONHDLC SERPL-MCNC: 114 MG/DL
PH URINE: 6
POTASSIUM SERPL-SCNC: 4.5 MMOL/L
PROT SERPL-MCNC: 7.6 G/DL
PROTEIN URINE: NEGATIVE MG/DL
SODIUM SERPL-SCNC: 137 MMOL/L
SPECIFIC GRAVITY URINE: 1.02
TRIGL SERPL-MCNC: 116 MG/DL
UROBILINOGEN URINE: 1 MG/DL

## 2024-03-25 ENCOUNTER — NON-APPOINTMENT (OUTPATIENT)
Age: 59
End: 2024-03-25

## 2024-03-27 ENCOUNTER — NON-APPOINTMENT (OUTPATIENT)
Age: 59
End: 2024-03-27

## 2024-03-29 ENCOUNTER — OUTPATIENT (OUTPATIENT)
Dept: OUTPATIENT SERVICES | Facility: HOSPITAL | Age: 59
LOS: 1 days | End: 2024-03-29
Payer: SUBSIDIZED

## 2024-03-29 ENCOUNTER — APPOINTMENT (OUTPATIENT)
Dept: CT IMAGING | Facility: CLINIC | Age: 59
End: 2024-03-29
Payer: SUBSIDIZED

## 2024-03-29 ENCOUNTER — APPOINTMENT (OUTPATIENT)
Dept: MRI IMAGING | Facility: CLINIC | Age: 59
End: 2024-03-29
Payer: SUBSIDIZED

## 2024-03-29 DIAGNOSIS — C18.9 MALIGNANT NEOPLASM OF COLON, UNSPECIFIED: ICD-10-CM

## 2024-03-29 DIAGNOSIS — Z98.890 OTHER SPECIFIED POSTPROCEDURAL STATES: Chronic | ICD-10-CM

## 2024-03-29 DIAGNOSIS — Z90.49 ACQUIRED ABSENCE OF OTHER SPECIFIED PARTS OF DIGESTIVE TRACT: Chronic | ICD-10-CM

## 2024-03-29 DIAGNOSIS — Z00.8 ENCOUNTER FOR OTHER GENERAL EXAMINATION: ICD-10-CM

## 2024-03-29 LAB
ALBUMIN SERPL ELPH-MCNC: 4.3 G/DL
ALP BLD-CCNC: 51 U/L
ALT SERPL-CCNC: 30 U/L
ANION GAP SERPL CALC-SCNC: 11 MMOL/L
AST SERPL-CCNC: 28 U/L
BILIRUB SERPL-MCNC: 1.6 MG/DL
BUN SERPL-MCNC: 14 MG/DL
CALCIUM SERPL-MCNC: 9.7 MG/DL
CHLORIDE SERPL-SCNC: 100 MMOL/L
CO2 SERPL-SCNC: 26 MMOL/L
CREAT SERPL-MCNC: 0.95 MG/DL
EGFR: 93 ML/MIN/1.73M2
GLUCOSE SERPL-MCNC: 126 MG/DL
POTASSIUM SERPL-SCNC: 4.2 MMOL/L
PROT SERPL-MCNC: 6.6 G/DL
SODIUM SERPL-SCNC: 138 MMOL/L

## 2024-03-29 PROCEDURE — 70553 MRI BRAIN STEM W/O & W/DYE: CPT

## 2024-03-29 PROCEDURE — 70553 MRI BRAIN STEM W/O & W/DYE: CPT | Mod: 26

## 2024-03-29 PROCEDURE — 74177 CT ABD & PELVIS W/CONTRAST: CPT | Mod: 26

## 2024-03-29 PROCEDURE — 74177 CT ABD & PELVIS W/CONTRAST: CPT

## 2024-03-29 PROCEDURE — A9585: CPT

## 2024-03-29 PROCEDURE — 71260 CT THORAX DX C+: CPT | Mod: 26

## 2024-03-29 PROCEDURE — 71260 CT THORAX DX C+: CPT

## 2024-03-30 RX ORDER — DEXAMETHASONE 4 MG/1
4 TABLET ORAL
Qty: 30 | Refills: 1 | Status: ACTIVE | COMMUNITY
Start: 2024-03-30 | End: 1900-01-01

## 2024-04-01 ENCOUNTER — NON-APPOINTMENT (OUTPATIENT)
Age: 59
End: 2024-04-01

## 2024-04-02 ENCOUNTER — APPOINTMENT (OUTPATIENT)
Dept: INFUSION THERAPY | Facility: HOSPITAL | Age: 59
End: 2024-04-02

## 2024-04-02 ENCOUNTER — NON-APPOINTMENT (OUTPATIENT)
Age: 59
End: 2024-04-02

## 2024-04-02 ENCOUNTER — APPOINTMENT (OUTPATIENT)
Dept: HEMATOLOGY ONCOLOGY | Facility: CLINIC | Age: 59
End: 2024-04-02
Payer: COMMERCIAL

## 2024-04-02 DIAGNOSIS — R11.2 NAUSEA WITH VOMITING, UNSPECIFIED: ICD-10-CM

## 2024-04-02 DIAGNOSIS — Z51.11 ENCOUNTER FOR ANTINEOPLASTIC CHEMOTHERAPY: ICD-10-CM

## 2024-04-02 LAB
ALBUMIN SERPL ELPH-MCNC: 4.4 G/DL
ALP BLD-CCNC: 57 U/L
ALT SERPL-CCNC: 30 U/L
ANION GAP SERPL CALC-SCNC: 11 MMOL/L
AST SERPL-CCNC: 25 U/L
BASOPHILS # BLD AUTO: 0.04 K/UL
BASOPHILS NFR BLD AUTO: 0.6 %
BILIRUB SERPL-MCNC: 1.4 MG/DL
BUN SERPL-MCNC: 10 MG/DL
CALCIUM SERPL-MCNC: 9.9 MG/DL
CANCER AG19-9 SERPL-ACNC: 92 U/ML
CEA SERPL-MCNC: 46.7 NG/ML
CHLORIDE SERPL-SCNC: 99 MMOL/L
CO2 SERPL-SCNC: 26 MMOL/L
CREAT SERPL-MCNC: 0.72 MG/DL
EGFR: 107 ML/MIN/1.73M2
EOSINOPHIL # BLD AUTO: 0.15 K/UL
EOSINOPHIL NFR BLD AUTO: 2.4 %
GLUCOSE SERPL-MCNC: 168 MG/DL
HCT VFR BLD CALC: 42.9 %
HGB BLD-MCNC: 15.4 G/DL
IMM GRANULOCYTES NFR BLD AUTO: 0.2 %
LYMPHOCYTES # BLD AUTO: 1.98 K/UL
LYMPHOCYTES NFR BLD AUTO: 31.2 %
MAN DIFF?: NORMAL
MCHC RBC-ENTMCNC: 34.2 PG
MCHC RBC-ENTMCNC: 35.9 GM/DL
MCV RBC AUTO: 95.3 FL
MONOCYTES # BLD AUTO: 0.56 K/UL
MONOCYTES NFR BLD AUTO: 8.8 %
NEUTROPHILS # BLD AUTO: 3.6 K/UL
NEUTROPHILS NFR BLD AUTO: 56.8 %
PLATELET # BLD AUTO: 156 K/UL
POTASSIUM SERPL-SCNC: 4.2 MMOL/L
PROT SERPL-MCNC: 7.1 G/DL
RBC # BLD: 4.5 M/UL
RBC # FLD: 12.8 %
SODIUM SERPL-SCNC: 137 MMOL/L
WBC # FLD AUTO: 6.34 K/UL

## 2024-04-02 PROCEDURE — G2211 COMPLEX E/M VISIT ADD ON: CPT

## 2024-04-02 PROCEDURE — 93005 ELECTROCARDIOGRAM TRACING: CPT

## 2024-04-02 PROCEDURE — 99214 OFFICE O/P EST MOD 30 MIN: CPT

## 2024-04-02 PROCEDURE — 93010 ELECTROCARDIOGRAM REPORT: CPT

## 2024-04-02 NOTE — ASSESSMENT
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 There was lymphovascular invasion, with 0 of 14 lymph nodes involved with cancer ([id number], high-risk stage II). Proficient mismatch repair.   He received adjuvant capecitabine x 6 months 3/4/21-end of August 2021.  Genetic testing MyRisk = negative.  CAT scans 8/2023 showed portal nodes, and IR biospy on 8/22/22 is positive for relapsed metastatic adenocarcinoma. F1CDx pan-LEONARD wild type, TMB = 4, TPS = 0%.  XELOX + bevacizumab started 2/7/2023. oxaliplatin 130 mg/m2 + bevacizumab 10 mg/kg + capecitabine q3 week cycles. He has 8 cycles, the last of which was 7/5/2023. He was then on maintenance therapy with Avastin (last dose 2/27/24) + Xeloda (last 2/29/24 AM)  We re-reviewed the "Randomized Phase 2 Study of DKN-01 Plus FOLFIRI/FOLFOX and Bevacizumab Versus FOLFIRI/FOLFOX and Bevacizumab as Second-line Treatment of Advanced Colorectal Cancer (DeFianCe)" He and his wife agreed to move forward and a consent process was performed. I have ordered MRI head and repeat imaging, as well as repeat labs.  His Total bilirubin was elevated prior visit, and is now improving. There is evidence of Gilbert's disease in terms of heterozygous UGT1A1  Repeat bilirubin today decreased to 2.4 (was 3.4) Will review with sponsor about the bilirubin and if this level acceptable for eligibility. Will also repeat bilirubin in 1-2 weeks prior to deciding on eligibility.  C1D1 FOLFIRI + Bevacizumab (Mvasi)  04/02/2024 5-FU 2400 mg/m2; 5-FU  mg/m2; Irinotecan 180 mg/m2 + Bevacizumab 5 mg/kg  Patient has had FOLFOX + Korin in the past. We re-reviewed adverse events.  He will be using Dexamethasone on days 2+3 and Compazine PRN for nausea.  chris partida MD during off week of treatment for toxicity evaluation.

## 2024-04-02 NOTE — ASSESSMENT
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 There was lymphovascular invasion, with 0 of 14 lymph nodes involved with cancer ([id number], high-risk stage II). Proficient mismatch repair.   He received adjuvant capecitabine x 6 months 3/4/21-end of August 2021.  Genetic testing MyRisk = negative.  CAT scans 8/2023 showed portal nodes, and IR biospy on 8/22/22 is positive for relapsed metastatic adenocarcinoma. F1CDx pan-LEONARD wild type, TMB = 4, TPS = 0%.  XELOX + bevacizumab started 2/7/2023. oxaliplatin 130 mg/m2 + bevacizumab 10 mg/kg + capecitabine q3 week cycles. He has 8 cycles, the last of which was 7/5/2023. He was then on maintenance therapy with Avastin (last dose 2/27/24) + Xeloda (last 2/29/24 AM)  We re-reviewed the "Randomized Phase 2 Study of DKN-01 Plus FOLFIRI/FOLFOX and Bevacizumab Versus FOLFIRI/FOLFOX and Bevacizumab as Second-line Treatment of Advanced Colorectal Cancer (DeFianCe)" He and his wife agreed to move forward and a consent process was performed. I have ordered MRI head and repeat imaging, as well as repeat labs.  His Total bilirubin was elevated prior visit, and is now improving. There is evidence of Gilbert's disease in terms of heterozygous UGT1A1  Repeat bilirubin today decreased to 2.4 (was 3.4) Will review with sponsor about the bilirubin and if this level acceptable for eligibility. Will also repeat bilirubin in 1-2 weeks prior to deciding on eligibility.  Time = 45 min  operating room

## 2024-04-02 NOTE — PHYSICAL EXAM
[Fully active, able to carry on all pre-disease performance without restriction] : Status 0 - Fully active, able to carry on all pre-disease performance without restriction [Normal] : affect appropriate [de-identified] : anicteric [de-identified] : right chest wall mediport c/d/i [de-identified] : 3x3 non blanching macule on left elbow, PPE grade 2 on hands

## 2024-04-02 NOTE — HISTORY OF PRESENT ILLNESS
[de-identified] : Mr. Mccarthy was found to have mild iron deficiency anemia of routine blood work at the end of 2020. Colonoscopy revealed a large tumor in the proximal transverse colon. He had no GI symptoms. A CT of the chest, abdomen and pelvis in January, 2021 revealed no evidence of metastatic disease.  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 There was lymphovascular invasion, with 0 of 14 lymph nodes involved with cancer ([id number], high-risk stage II). Proficient mismatch repair.  He received adjuvant capecitabine x 6 months 3/4/21-end of August 2021 with Dr. Lula Sumner, with minimal toxicity. His CEA was persistently elevated near 7, with multiple negative scans.  ctDNA testing in January, 2022 was unrevealing.  In August, 2022 CT scans showed several enlarging paraaortic lymph nodes (reference node 2.3 x 1.1cm, previously 1.4 x 0.5cm) and two (bilateral) subcentimeter lung nodules. LYMPH NODE BIOPSY AND CYTOLOGIC PREPARATION (ABDOMINAL AORTOCAVAL LYMPH NODE): - METASTATIC ADENOCARCINOMA WITH NECROSIS; MORPHOLOGICALLY CONSISTENT WITH PATIENT'S PREVIOUSLY DIAGNOSED COLONIC ADENOCARCINOMA.  Mr. Mccarthy has been back to Dr. Sumner, was offered FOLFOX + bevacizumab. [place] [facility] [place] watchful waiting versus irinotecan. His friend, Norbert Borjas is my patient, and suggested he see me as third opinion. Found to have stable aortocaval lymph nodes previously noted on the PET scan on CT imaging at the end of October, 2022.  An IR-guided biopsy of the aortocaval node shortly thereafter confirmed metastatic adenocarcinoma morphologically equivalent to his known colon primary. CT scan completed January, 2023 showed increase in size and number of lung metastases. New liver lesions suspicious for metastatic disease. Increase in size of a periceliac lymph node. Other retroperitoneal lymph nodes are stable.  Proceeded to XELOX + bevacizumab.  2/20/24 CAT Scan CAP COMPARISON: CT chest/abdomen/pelvis 10/31/2023 and 7/24/2023. LUNGS AND LARGE AIRWAYS: Patent central airways. Bilateral pulmonary nodules, many of which are cavitary, increased in size compared to prior. Reference: Right upper lobe nodule (3-49), 1.6 x 1.4 cm, previously 1.3 x 1.2 cm. Right upper lobe nodule (3-43), 1.5 cm, previously 1.1 cm, with increased cavitation. Left lower lobe nodule (3-24), 1.1 cm, previously 0.9 cm. PLEURA: No pleural effusion. VESSELS: Atherosclerotic changes of the coronary arteries. HEART: Heart size is normal. No pericardial effusion. MEDIASTINUM AND SONIA: Prominent right hilar lymph node (2-65), 1.1 x 1.1 cm, previously 1.4 x 1.1 cm. CHEST WALL AND LOWER NECK: Right anterior chest wall infusion port with tip in the SVC. LIVER: Caudate lobe hypoattenuating lesion (3-135), 1.5 x 1.2 cm, previously 1.6 x 1.1 cm. BILE DUCTS: Normal caliber. GALLBLADDER: Cholecystectomy. SPLEEN: Within normal limits. PANCREAS: Within normal limits. ADRENALS: Within normal limits. KIDNEYS/URETERS: Bilateral symmetric enhancement. No hydronephrosis. Punctate nonobstructing left renal interpole calculus. BLADDER: Minimally distended. REPRODUCTIVE ORGANS: Prostate within normal limits. BOWEL: Small hiatal hernia. Right hemicolectomy. No bowel obstruction. PERITONEUM: No ascites. Cystic lesion adjacent to the 4th portion of the duodenum (3-181), 1.9 x 1.5 cm, may represent lymphangioma or duplication cyst. VESSELS: Atherosclerotic changes. Left hepatic artery arising from the left gastric artery, anatomic variant. RETROPERITONEUM/LYMPH NODES: Increased retroperitoneal soft tissue with encasement of the the celiac axis, proximal common hepatic and left gastric arteries.  Partially calcified left para-aortic node (3-146), 2.1 x 1 cm, stable. ABDOMINAL WALL: Postsurgical changes. Tiny fat-containing umbilical hernia. BONES: Degenerative changes. IMPRESSION: Bilateral pulmonary nodules, many of which are cavitary, increased in size compared to prior 10/31/2023. Increased retroperitoneal soft tissue with encasement of the celiac axis, proximal common hepatic and left gastric arteries. Stable hepatic lesion.  Review of labs from 2/27/2024: CEA = 57.6, T bili = 2.8, Gluc = 221. Xeloda 8 pills 2 weeks on / 1 week off; started 11/16/2023 and last dose 2/29/24 AM. Avastin q 3 weeks, last dose = 2/27/24.  3/20/2024 Patient returned from vacation. Feels well and would like to move forward with treatment. We re-reviewed the "Randomized Phase 2 Study of DKN-01 Plus FOLFIRI/FOLFOX and Bevacizumab Versus FOLFIRI/FOLFOX and Bevacizumab as Second-line Treatment of Advanced Colorectal Cancer (DeFianCe)" He and his wife agreed to move forward and a consent process was performed. I have ordered MRI head and repeat imaging, as well as repeat labs. His Total bilirubin was elevated prior visit, and is now improving. There is evidence of Gilbert's disease in terms of heterozygous UGT1A1  Repeat bilirubin today decreased to 2.4 (was 3.4) Will review with sponsor about the bilirubin and if this level acceptable for eligibility.  04/02/2024 C1D1 FOLFIRI + Bevacizumab today, 5-FU 2400 mg/m2; 5-FU  mg/m2; Irinotecan 180 mg/m2 + Bevacizumab 5 mg/kg Patient has had FOLFOX + Korin in the past. We re-reviewed adverse events. No complaints today. He will be using Dexamethasone on days 2+3 and Compazine PRN for nausea.

## 2024-04-02 NOTE — HISTORY OF PRESENT ILLNESS
[de-identified] : Mr. Mccarthy was found to have mild iron deficiency anemia of routine blood work at the end of 2020. Colonoscopy revealed a large tumor in the proximal transverse colon. He had no GI symptoms. A CT of the chest, abdomen and pelvis in January, 2021 revealed no evidence of metastatic disease.  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 There was lymphovascular invasion, with 0 of 14 lymph nodes involved with cancer ([id number], high-risk stage II). Proficient mismatch repair.  He received adjuvant capecitabine x 6 months 3/4/21-end of August 2021 with Dr. Lula Sumner, with minimal toxicity. His CEA was persistently elevated near 7, with multiple negative scans.  ctDNA testing in January, 2022 was unrevealing.  In August, 2022 CT scans showed several enlarging paraaortic lymph nodes (reference node 2.3 x 1.1cm, previously 1.4 x 0.5cm) and two (bilateral) subcentimeter lung nodules. LYMPH NODE BIOPSY AND CYTOLOGIC PREPARATION (ABDOMINAL AORTOCAVAL LYMPH NODE): - METASTATIC ADENOCARCINOMA WITH NECROSIS; MORPHOLOGICALLY CONSISTENT WITH PATIENT'S PREVIOUSLY DIAGNOSED COLONIC ADENOCARCINOMA.  Mr. Mccarthy has been back to Dr. Sumner, was offered FOLFOX + bevacizumab. [place] [facility] [place] watchful waiting versus irinotecan. His friend, Norbert Borjas is my patient, and suggested he see me as third opinion. Found to have stable aortocaval lymph nodes previously noted on the PET scan on CT imaging at the end of October, 2022.  An IR-guided biopsy of the aortocaval node shortly thereafter confirmed metastatic adenocarcinoma morphologically equivalent to his known colon primary. CT scan completed January, 2023 showed increase in size and number of lung metastases. New liver lesions suspicious for metastatic disease. Increase in size of a periceliac lymph node. Other retroperitoneal lymph nodes are stable.  Proceeded to XELOX + bevacizumab.  2/20/24 CAT Scan CAP COMPARISON: CT chest/abdomen/pelvis 10/31/2023 and 7/24/2023. LUNGS AND LARGE AIRWAYS: Patent central airways. Bilateral pulmonary nodules, many of which are cavitary, increased in size compared to prior. Reference: Right upper lobe nodule (3-49), 1.6 x 1.4 cm, previously 1.3 x 1.2 cm. Right upper lobe nodule (3-43), 1.5 cm, previously 1.1 cm, with increased cavitation. Left lower lobe nodule (3-24), 1.1 cm, previously 0.9 cm. PLEURA: No pleural effusion. VESSELS: Atherosclerotic changes of the coronary arteries. HEART: Heart size is normal. No pericardial effusion. MEDIASTINUM AND SONIA: Prominent right hilar lymph node (2-65), 1.1 x 1.1 cm, previously 1.4 x 1.1 cm. CHEST WALL AND LOWER NECK: Right anterior chest wall infusion port with tip in the SVC. LIVER: Caudate lobe hypoattenuating lesion (3-135), 1.5 x 1.2 cm, previously 1.6 x 1.1 cm. BILE DUCTS: Normal caliber. GALLBLADDER: Cholecystectomy. SPLEEN: Within normal limits. PANCREAS: Within normal limits. ADRENALS: Within normal limits. KIDNEYS/URETERS: Bilateral symmetric enhancement. No hydronephrosis. Punctate nonobstructing left renal interpole calculus. BLADDER: Minimally distended. REPRODUCTIVE ORGANS: Prostate within normal limits. BOWEL: Small hiatal hernia. Right hemicolectomy. No bowel obstruction. PERITONEUM: No ascites. Cystic lesion adjacent to the 4th portion of the duodenum (3-181), 1.9 x 1.5 cm, may represent lymphangioma or duplication cyst. VESSELS: Atherosclerotic changes. Left hepatic artery arising from the left gastric artery, anatomic variant. RETROPERITONEUM/LYMPH NODES: Increased retroperitoneal soft tissue with encasement of the the celiac axis, proximal common hepatic and left gastric arteries.  Partially calcified left para-aortic node (3-146), 2.1 x 1 cm, stable. ABDOMINAL WALL: Postsurgical changes. Tiny fat-containing umbilical hernia. BONES: Degenerative changes. IMPRESSION: Bilateral pulmonary nodules, many of which are cavitary, increased in size compared to prior 10/31/2023. Increased retroperitoneal soft tissue with encasement of the celiac axis, proximal common hepatic and left gastric arteries. Stable hepatic lesion.  Review of labs from 2/27/2024: CEA = 57.6, T bili = 2.8, Gluc = 221. Xeloda 8 pills 2 weeks on / 1 week off; started 11/16/2023 and last dose 2/29/24 AM. Avastin q 3 weeks, last dose = 2/27/24.  3/20/2024 Patient returned from vacation. Feels well and would like to move forward with treatment. We re-reviewed the "Randomized Phase 2 Study of DKN-01 Plus FOLFIRI/FOLFOX and Bevacizumab Versus FOLFIRI/FOLFOX and Bevacizumab as Second-line Treatment of Advanced Colorectal Cancer (DeFianCe)" He and his wife agreed to move forward and a consent process was performed. I have ordered MRI head and repeat imaging, as well as repeat labs. His Total bilirubin was elevated prior visit, and is now improving. There is evidence of Gilbert's disease in terms of heterozygous UGT1A1  Repeat bilirubin today decreased to 2.4 (was 3.4) Will review with sponsor about the bilirubin and if this level acceptable for eligibility.  In regard to Eligibility for DEFIANCE trial ([employer]): LETREV [id number]: Randomized Phase 2 Study of DKN-01 Plus FOLFIRI/FOLFOX and Bevacizumab Versus FOLFIRI/FOLFOX and Bevacizumab as Second-line Treatment of Advanced Colorectal Cancer (DeFianCe)  Patient has consented to DKN-01 [id number] Version 4.0, 01 Dec 2023 and has met the following eligibility criteria:  - Histologically proven diagnosis of advanced colorectal adenocarcinoma (by local laboratory and local clinical guidelines) with documented objective radiographic disease progression following first-line therapy with any fluoropyrimidine-based regimen for advanced disease (except FOLFOXIRI)   FEB/2024 - Able to provide written informed consent and can understand and agree to comply with the requirements of the study and the schedule of assessments.  - Age 18 years on the day of signing the informed consent (exception: 19 years in the [place]). ( [birth year]) - Presence of at least one measurable lesion assessed by CT and/or MRI according to RECIST 1.1. (A lesion in an area subjected to prior loco-regional therapy, including previous radiotherapy, is not considered measurable unless there has been demonstrated progression in the lesion since the therapy as defined by RECIST v1.1.). ( Performed 3/29/24 ) - Sufficient tumor tissue for mandatory pre-treatment evaluation (fresh biopsy [preferred], or archived tissue block specimen).  - ECOG performance status 1 within 7 days of first dose of study drug. ( ECOG 0)  - Acceptable liver function:  a. Total bilirubin 1.5 times upper limit of normal (ULN) (if Gilbert's disease present, then 3.0 times ULN is allowed). ( 1.6)  b. AST and ALT, 2.5 times ULN (if liver metastases are present, then 5ULN is allowed). (28 and 30) 8. Acceptable renal function:  	a. Serum creatinine 1.5ULN or estimated glomerular filtration rate 30 mL/min/1.73 m2 by Chronic Kidney Disease Epidemiology Collaboration equation (Cr.0.95)  9. Acceptable hematologic status (in the [place] patients must not have required blood transfusion or growth factor support within 14 days before sample collection at screening for the following):  a. Absolute neutrophil count (ANC) 1.5109/L.  ( 3.19) b. Platelets 588900/L. ( 115 ) c. Hemoglobin 9 g/dL. ( 16.4) 10. Acceptable coagulation status:  a. Prothrombin time/activated partial thromboplastin time 1.2  ULN ( 10.7 / 27.1)  b. INR 1.5 (unless receiving anticoagulation therapy) ( 0.94) - Females of childbearing potential  willing to use a highly effective method of birth control for the duration of the study, and for at least 6 months after the last dose of study drugs and have a negative urine or serum pregnancy test within 7 days before first dose of study drugs.  No Microsatellite instability-high (MSI-H)/mismatch repair deficient (dMMR) and/or BRAF V600E mutation positive colorectal cancer.  No Prior therapy with an anti-DKK1 agent.  No Prior therapy with FOLFOXIRI.  No Prior therapy with an anti-programmed cell death protein ligand-1 [PD-(L)1] or anti-programmed cell death protein ligand-2 (PD-L2) or any other antibody or drug specifically targeting T-cell co-stimulation or co-inhibitory checkpoint pathways in any treatment setting (including adjuvant/neoadjuvant).  No Systemic anti-cancer therapy within 28 days prior to first dose of study drug. ( last 2/29/24) No Major surgery within 28 days prior to first dose of study drug.  No Treatment with radiation therapy within 14 days prior to first dose of study drug.  No Active leptomeningeal disease or uncontrolled brain metastases. Patients with equivocal findings or with confirmed brain metastases are eligible for the study provided that they are asymptomatic and radiologically stable without the need for corticosteroid treatment or seizure prophylaxis for 4 weeks before first dose of study drug.  No History of gastrointestinal perforation and/or fistulae within 6 months prior to first dose of study drug, clinically significant bleeding from the gastrointestinal tract or clinically significant bowel obstruction (CTCAE Grade 2) within 28 days before first dose of study drug.  No Any active malignancy 2 years before first dose of study drug, with the exception of the specific cancer under investigation in this study and any locally recurring cancer that has been treated curatively (e.g., resected basal or squamous cell skin cancer, superficial bladder cancer, carcinoma in situ of the cervix or breast).  No Uncontrolled diabetes or >Grade 1 laboratory test abnormalities in potassium, sodium, or corrected calcium despite standard medical management or Grade 3 hypoalbuminemia within 14 days before first dose of study drug.  No Uncontrolled arterial hypertension defined by blood pressure >150 mmHg systolic and/or 100 mmHg diastolic at rest despite appropriate medical therapy within 28 days before first dose of study drug.  Proteinuria, as demonstrated by >1.5 gram of protein in a 24-hour urine collection. All patients with 2+ protein on dipstick urinalysis at baseline must undergo 24-hr urine collection for protein.  No Uncontrollable pleural effusion, pericardial effusion, or ascites requiring frequent drainage within 7 days prior to first dose of study drug (the cytological confirmation of any effusion is permitted).  No Clinically significant anorexia (CTCAE Grade 2) within 7 days prior to first dose of study drug.  No Severe chronic or active infections requiring systemic antibacterial, antifungal, or antiviral therapy, including tuberculosis infection within 14 days of first dose of study drug.  No Prior allogeneic stem cell transplantation or organ transplantation.  Patient does not have any of the following cardiovascular risk factors:  a. Cardiac chest pain, defined as moderate pain that limits instrumental activities of daily living within 28 days before first dose of study drug.  b. Pulmonary embolism within 28 days before first dose of study drug.  c. Any history of acute myocardial infarction within 6 months before first dose of study drug.  d. Any history of heart failure meeting New York Heart Association Classification III or IV within 6 months before first dose of study drug.  e. Any event of ventricular arrhythmia Grade 2 in severity within 6 months before first dose of study drug.  f. Any history of cerebrovascular accident, including transient ischemic attack, within 6months before first dose of study drug.  g. Clinically significant peripheral artery disease  19. No evidence of bleeding diathesis or significant coagulopathy.  20. No episode of syncope or seizure within 28 days before first dose of study drug.  Fridericia-corrected QT interval >470 msec (female) or >450 (male), or history of congenital long QT syndrome. Any ECG abnormality that in the opinion of the Investigator would preclude safe participation in the study; patients with pacemakers where QTc is not a reliable measure will require an evaluation by a cardiologist to exclude co-existing cardiac conditions which would prohibit safe participation in the study. (410 secs)  No Known to be human immunodeficiency virus (HIV) positive unless HIV RNA is undetected, have hepatitis B surface antigen, or hepatitis C antibodies unless hepatitis C virus riboneucleic acid (RNA) is undetected/negative.  No Serious nonmalignant disease or other circumstance that could compromise protocol objectives or place the patient at risk in the opinion of the Investigator and/or the Sponsor.  	No History of osteonecrosis of the hip or have evidence of structural bone abnormalities in the proximal femur on MRI scan that are symptomatic and clinically significant. Degenerative changes of the hip joint are not exclusionary. Screening of asymptomatic patients is not required.  	No Known osteoblastic bony metastasis. Screening of asymptomatic patients without a history of metastatic bony lesions is not required.  	No Serious psychiatric or medical conditions that could interfere with treatment.  	No Toxicities (as a result of prior anticancer therapy) that have not recovered to baseline or stabilized, except for AEs not considered a likely safety risk (e.g., alopecia, neuropathy and specific laboratory abnormalities).  	No Administration of a live vaccine within 28 days before first dose of study drug.  Note: Seasonal vaccines for influenza or COVID-19 vaccines are generally inactivated vaccines and are allowed. Intranasal vaccines are live vaccines and are not allowed.  - No Underlying medical conditions (including laboratory abnormalities) or alcohol or drug abuse or dependence that will be unfavorable for the administration of study drug, or affect the explanation of drug toxicity or AEs, or result in insufficient or impaired compliance with study conduct.  No Concurrent participation in another therapeutic clinical study.  Known UGT1A1 deficiency (sponsor aware of heterozygosity and deem fine to be on study)

## 2024-04-02 NOTE — PHYSICAL EXAM
[Fully active, able to carry on all pre-disease performance without restriction] : Status 0 - Fully active, able to carry on all pre-disease performance without restriction [Normal] : affect appropriate [de-identified] : anicteric [de-identified] : right chest wall mediport c/d/i [de-identified] : 3x3 non blanching macule on left elbow, PPE grade 2 on hands

## 2024-04-02 NOTE — REVIEW OF SYSTEMS
[Recent Change In Weight] : ~T no recent weight change [Dysphagia] : no dysphagia [Chest Pain] : no chest pain [Odynophagia] : no odynophagia [Shortness Of Breath] : no shortness of breath [Joint Pain] : no joint pain [Joint Stiffness] : no joint stiffness [Skin Rash] : no skin rash [Easy Bleeding] : no tendency for easy bleeding [Easy Bruising] : no tendency for easy bruising [Swollen Glands] : no swollen glands

## 2024-04-04 ENCOUNTER — APPOINTMENT (OUTPATIENT)
Dept: INFUSION THERAPY | Facility: HOSPITAL | Age: 59
End: 2024-04-04

## 2024-04-08 ENCOUNTER — NON-APPOINTMENT (OUTPATIENT)
Age: 59
End: 2024-04-08

## 2024-04-09 ENCOUNTER — APPOINTMENT (OUTPATIENT)
Dept: INFUSION THERAPY | Facility: HOSPITAL | Age: 59
End: 2024-04-09

## 2024-04-09 ENCOUNTER — APPOINTMENT (OUTPATIENT)
Age: 59
End: 2024-04-09

## 2024-04-09 ENCOUNTER — APPOINTMENT (OUTPATIENT)
Dept: HEMATOLOGY ONCOLOGY | Facility: CLINIC | Age: 59
End: 2024-04-09
Payer: COMMERCIAL

## 2024-04-09 VITALS
HEART RATE: 94 BPM | RESPIRATION RATE: 16 BRPM | TEMPERATURE: 97.8 F | OXYGEN SATURATION: 98 % | WEIGHT: 191.8 LBS | DIASTOLIC BLOOD PRESSURE: 73 MMHG | SYSTOLIC BLOOD PRESSURE: 107 MMHG | BODY MASS INDEX: 29.16 KG/M2

## 2024-04-09 PROCEDURE — G2211 COMPLEX E/M VISIT ADD ON: CPT

## 2024-04-09 PROCEDURE — 99214 OFFICE O/P EST MOD 30 MIN: CPT

## 2024-04-09 NOTE — PHYSICAL EXAM
[Fully active, able to carry on all pre-disease performance without restriction] : Status 0 - Fully active, able to carry on all pre-disease performance without restriction [Normal] : affect appropriate [de-identified] : anicteric [de-identified] : right chest wall mediport c/d/i [de-identified] : 3x3 non blanching macule on left elbow, PPE grade 2 on hands

## 2024-04-09 NOTE — ASSESSMENT
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 There was lymphovascular invasion, with 0 of 14 lymph nodes involved with cancer ([id number], high-risk stage II). Proficient mismatch repair.   He received adjuvant capecitabine x 6 months 3/4/21-end of August 2021.  Genetic testing MyRisk = negative.  CAT scans 8/2023 showed portal nodes, and IR biospy on 8/22/22 is positive for relapsed metastatic adenocarcinoma. F1CDx pan-LEONARD wild type, TMB = 4, TPS = 0%.  XELOX + bevacizumab started 2/7/2023. oxaliplatin 130 mg/m2 + bevacizumab 10 mg/kg + capecitabine q3 week cycles. He has 8 cycles, the last of which was 7/5/2023. He was then on maintenance therapy with Avastin (last dose 2/27/24) + Xeloda (last 2/29/24 AM)  We re-reviewed the "Randomized Phase 2 Study of DKN-01 Plus FOLFIRI/FOLFOX and Bevacizumab Versus FOLFIRI/FOLFOX and Bevacizumab as Second-line Treatment of Advanced Colorectal Cancer (DeFianCe)" He and his wife agreed to move forward and a consent process was performed. I have ordered MRI head and repeat imaging, as well as repeat labs.  C1D1 FOLFIRI + Bevacizumab 4/2/24 5-FU 2400 mg/m2; 5-FU  mg/m2; Irinotecan 180 mg/m2 + Bevacizumab 5 mg/kg Patient has had FOLFOX + Korin in the past. We re-reviewed adverse events. He feels that his tolerance was pretty good. He notes that when is off of dexamethasone on day 4, that fatigue is increased. Stool - loose stool up to 3 times and did not need immodium. Hiccoughs - on day 2 and 3 of treatment, and will look into use of Emend since not on DKN-01  RTO 1 week.

## 2024-04-09 NOTE — HISTORY OF PRESENT ILLNESS
[de-identified] : Mr. Mccarthy was found to have mild iron deficiency anemia of routine blood work at the end of 2020. Colonoscopy revealed a large tumor in the proximal transverse colon. He had no GI symptoms. A CT of the chest, abdomen and pelvis in January, 2021 revealed no evidence of metastatic disease.  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 There was lymphovascular invasion, with 0 of 14 lymph nodes involved with cancer ([id number], high-risk stage II). Proficient mismatch repair.  He received adjuvant capecitabine x 6 months 3/4/21-end of August 2021 with Dr. Lula uSmner, with minimal toxicity. His CEA was persistently elevated near 7, with multiple negative scans.  ctDNA testing in January, 2022 was unrevealing.  In August, 2022 CT scans showed several enlarging paraaortic lymph nodes (reference node 2.3 x 1.1cm, previously 1.4 x 0.5cm) and two (bilateral) subcentimeter lung nodules. LYMPH NODE BIOPSY AND CYTOLOGIC PREPARATION (ABDOMINAL AORTOCAVAL LYMPH NODE): - METASTATIC ADENOCARCINOMA WITH NECROSIS; MORPHOLOGICALLY CONSISTENT WITH PATIENT'S PREVIOUSLY DIAGNOSED COLONIC ADENOCARCINOMA.  Mr. Mccarthy has been back to Dr. Sumner, was offered FOLFOX + bevacizumab. He has also seen doctors from [facility] [place], who offered him watchful waiting versus irinotecan. His friend, Norbert Borjas is my patient, and suggested he see me as third opinion. Found to have stable aortocaval lymph nodes previously noted on the PET scan on CT imaging at the end of October, 2022.  An IR-guided biopsy of the aortocaval node shortly thereafter confirmed metastatic adenocarcinoma morphologically equivalent to his known colon primary. CT scan completed January, 2023 showed increase in size and number of lung metastases. New liver lesions suspicious for metastatic disease. Increase in size of a periceliac lymph node. Other retroperitoneal lymph nodes are stable.  Proceeded to XELOX + bevacizumab.  2/20/24 CAT Scan CAP COMPARISON: CT chest/abdomen/pelvis 10/31/2023 and 7/24/2023. LUNGS AND LARGE AIRWAYS: Patent central airways. Bilateral pulmonary nodules, many of which are cavitary, increased in size compared to prior. Reference: Right upper lobe nodule (3-49), 1.6 x 1.4 cm, previously 1.3 x 1.2 cm. Right upper lobe nodule (3-43), 1.5 cm, previously 1.1 cm, with increased cavitation. Left lower lobe nodule (3-24), 1.1 cm, previously 0.9 cm. PLEURA: No pleural effusion. VESSELS: Atherosclerotic changes of the coronary arteries. HEART: Heart size is normal. No pericardial effusion. MEDIASTINUM AND SONIA: Prominent right hilar lymph node (2-65), 1.1 x 1.1 cm, previously 1.4 x 1.1 cm. CHEST WALL AND LOWER NECK: Right anterior chest wall infusion port with tip in the SVC. LIVER: Caudate lobe hypoattenuating lesion (3-135), 1.5 x 1.2 cm, previously 1.6 x 1.1 cm. BILE DUCTS: Normal caliber. GALLBLADDER: Cholecystectomy. SPLEEN: Within normal limits. PANCREAS: Within normal limits. ADRENALS: Within normal limits. KIDNEYS/URETERS: Bilateral symmetric enhancement. No hydronephrosis. Punctate nonobstructing left renal interpole calculus. BLADDER: Minimally distended. REPRODUCTIVE ORGANS: Prostate within normal limits. BOWEL: Small hiatal hernia. Right hemicolectomy. No bowel obstruction. PERITONEUM: No ascites. Cystic lesion adjacent to the 4th portion of the duodenum (3-181), 1.9 x 1.5 cm, may represent lymphangioma or duplication cyst. VESSELS: Atherosclerotic changes. Left hepatic artery arising from the left gastric artery, anatomic variant. RETROPERITONEUM/LYMPH NODES: Increased retroperitoneal soft tissue with encasement of the the celiac axis, proximal common hepatic and left gastric arteries.  Partially calcified left para-aortic node (3-146), 2.1 x 1 cm, stable. ABDOMINAL WALL: Postsurgical changes. Tiny fat-containing umbilical hernia. BONES: Degenerative changes. IMPRESSION: Bilateral pulmonary nodules, many of which are cavitary, increased in size compared to prior 10/31/2023. Increased retroperitoneal soft tissue with encasement of the celiac axis, proximal common hepatic and left gastric arteries. Stable hepatic lesion.  Review of labs from 2/27/2024: CEA = 57.6, T bili = 2.8, Gluc = 221. Xeloda 8 pills 2 weeks on / 1 week off; started 11/16/2023 and last dose 2/29/24 AM. Avastin q 3 weeks, last dose = 2/27/24.  3/20/2024 Patient returned from vacation. Feels well and would like to move forward with treatment. We re-reviewed the "Randomized Phase 2 Study of DKN-01 Plus FOLFIRI/FOLFOX and Bevacizumab Versus FOLFIRI/FOLFOX and Bevacizumab as Second-line Treatment of Advanced Colorectal Cancer (DeFianCe)" He and his wife agreed to move forward and a consent process was performed. I have ordered MRI head and repeat imaging, as well as repeat labs. His Total bilirubin was elevated prior visit, and is now improving. There is evidence of Gilbert's disease in terms of heterozygous UGT1A1  Repeat bilirubin today decreased to 2.4 (was 3.4) Will review with sponsor about the bilirubin and if this level acceptable for eligibility.  4/9/2024 C1D1 FOLFIRI + Bevacizumab 4/1/24 5-FU 2400 mg/m2; 5-FU  mg/m2; Irinotecan 180 mg/m2 + Bevacizumab 5 mg/kg Patient has had FOLFOX + Korin in the past. We re-reviewed adverse events. He feels that his tolerance was pretty good. He notes that when is off of dexamethasone on day 4, that fatigue is increased. Stool - loose stool up to 3 times and did not need immodium. Hiccoughs - on day 2 and 3 of treatment, and raman look into use of Emend since not on DKN-01

## 2024-04-10 ENCOUNTER — NON-APPOINTMENT (OUTPATIENT)
Age: 59
End: 2024-04-10

## 2024-04-15 ENCOUNTER — LABORATORY RESULT (OUTPATIENT)
Age: 59
End: 2024-04-15

## 2024-04-15 ENCOUNTER — NON-APPOINTMENT (OUTPATIENT)
Age: 59
End: 2024-04-15

## 2024-04-16 ENCOUNTER — APPOINTMENT (OUTPATIENT)
Dept: HEMATOLOGY ONCOLOGY | Facility: CLINIC | Age: 59
End: 2024-04-16
Payer: COMMERCIAL

## 2024-04-16 ENCOUNTER — RESULT REVIEW (OUTPATIENT)
Age: 59
End: 2024-04-16

## 2024-04-16 ENCOUNTER — NON-APPOINTMENT (OUTPATIENT)
Age: 59
End: 2024-04-16

## 2024-04-16 ENCOUNTER — APPOINTMENT (OUTPATIENT)
Dept: INFUSION THERAPY | Facility: HOSPITAL | Age: 59
End: 2024-04-16

## 2024-04-16 DIAGNOSIS — Z51.89 ENCOUNTER FOR OTHER SPECIFIED AFTERCARE: ICD-10-CM

## 2024-04-16 LAB
BASOPHILS # BLD AUTO: 0.02 K/UL — SIGNIFICANT CHANGE UP (ref 0–0.2)
BASOPHILS # BLD AUTO: 0.03 K/UL
BASOPHILS NFR BLD AUTO: 0.8 % — SIGNIFICANT CHANGE UP (ref 0–2)
BASOPHILS NFR BLD AUTO: 0.9 %
EOSINOPHIL # BLD AUTO: 0.12 K/UL — SIGNIFICANT CHANGE UP (ref 0–0.5)
EOSINOPHIL # BLD AUTO: 0.14 K/UL
EOSINOPHIL NFR BLD AUTO: 4 %
EOSINOPHIL NFR BLD AUTO: 4.7 % — SIGNIFICANT CHANGE UP (ref 0–6)
HCT VFR BLD CALC: 33 % — LOW (ref 39–50)
HCT VFR BLD CALC: 39.7 %
HGB BLD-MCNC: 12 G/DL — LOW (ref 13–17)
HGB BLD-MCNC: 13.3 G/DL
IMM GRANULOCYTES NFR BLD AUTO: 0.3 %
IMM GRANULOCYTES NFR BLD AUTO: 0.4 % — SIGNIFICANT CHANGE UP (ref 0–0.9)
LYMPHOCYTES # BLD AUTO: 1.19 K/UL — SIGNIFICANT CHANGE UP (ref 1–3.3)
LYMPHOCYTES # BLD AUTO: 1.62 K/UL
LYMPHOCYTES # BLD AUTO: 47 % — HIGH (ref 13–44)
LYMPHOCYTES NFR BLD AUTO: 46.7 %
MAN DIFF?: NORMAL
MCHC RBC-ENTMCNC: 33.5 GM/DL
MCHC RBC-ENTMCNC: 33.9 PG
MCHC RBC-ENTMCNC: 34.8 PG — HIGH (ref 27–34)
MCHC RBC-ENTMCNC: 37 G/DL — HIGH (ref 32–36)
MCV RBC AUTO: 101.3 FL
MCV RBC AUTO: 94 FL — SIGNIFICANT CHANGE UP (ref 80–100)
MONOCYTES # BLD AUTO: 0.33 K/UL — SIGNIFICANT CHANGE UP (ref 0–0.9)
MONOCYTES # BLD AUTO: 0.43 K/UL
MONOCYTES NFR BLD AUTO: 12.4 %
MONOCYTES NFR BLD AUTO: 13 % — SIGNIFICANT CHANGE UP (ref 2–14)
NEUTROPHILS # BLD AUTO: 0.86 K/UL — LOW (ref 1.8–7.4)
NEUTROPHILS # BLD AUTO: 1.24 K/UL
NEUTROPHILS NFR BLD AUTO: 34.1 % — LOW (ref 43–77)
NEUTROPHILS NFR BLD AUTO: 35.7 %
NRBC # BLD: 0 /100 WBCS — SIGNIFICANT CHANGE UP (ref 0–0)
PLATELET # BLD AUTO: 111 K/UL — LOW (ref 150–400)
PLATELET # BLD AUTO: 124 K/UL
RBC # BLD: 3.51 M/UL — LOW (ref 4.2–5.8)
RBC # BLD: 3.92 M/UL
RBC # FLD: 13.3 % — SIGNIFICANT CHANGE UP (ref 10.3–14.5)
RBC # FLD: 14.1 %
WBC # BLD: 2.53 K/UL — LOW (ref 3.8–10.5)
WBC # FLD AUTO: 2.53 K/UL — LOW (ref 3.8–10.5)
WBC # FLD AUTO: 3.47 K/UL

## 2024-04-16 PROCEDURE — G2211 COMPLEX E/M VISIT ADD ON: CPT

## 2024-04-16 PROCEDURE — 99214 OFFICE O/P EST MOD 30 MIN: CPT

## 2024-04-16 NOTE — PHYSICAL EXAM
[Fully active, able to carry on all pre-disease performance without restriction] : Status 0 - Fully active, able to carry on all pre-disease performance without restriction [Normal] : affect appropriate [de-identified] : anicteric [de-identified] : right chest wall mediport c/d/i [de-identified] : 3x3 non blanching macule on left elbow, PPE grade 2 on hands

## 2024-04-16 NOTE — ASSESSMENT
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 There was lymphovascular invasion, with 0 of 14 lymph nodes involved with cancer ([id number], high-risk stage II). Proficient mismatch repair.   He received adjuvant capecitabine x 6 months 3/4/21-end of August 2021.  Genetic testing MyRisk = negative.  CAT scans 8/2023 showed portal nodes, and IR biospy on 8/22/22 is positive for relapsed metastatic adenocarcinoma. F1CDx pan-LEONARD wild type, TMB = 4, TPS = 0%.  XELOX + bevacizumab started 2/7/2023. oxaliplatin 130 mg/m2 + bevacizumab 10 mg/kg + capecitabine q3 week cycles. He has 8 cycles, the last of which was 7/5/2023. He was then on maintenance therapy with Avastin (last dose 2/27/24) + Xeloda (last 2/29/24 AM)  We re-reviewed the "Randomized Phase 2 Study of DKN-01 Plus FOLFIRI/FOLFOX and Bevacizumab Versus FOLFIRI/FOLFOX and Bevacizumab as Second-line Treatment of Advanced Colorectal Cancer (DeFianCe)" He and his wife agreed to move forward and a consent process was performed. I have ordered MRI head and repeat imaging, as well as repeat labs.  FOLFIRI + Bevacizumab: C1D1 FOLFIRI + Bevacizumab 4/1/24 5-FU 2400 mg/m2; 5-FU  mg/m2; Irinotecan 180 mg/m2 + Bevacizumab 5 mg/kg Today is C2D1. Will be delayed 1 week due to ANC < 1000. Will change treatment dosing: Stop 5FU IVP, and lower irinotecan to 150. New treatment will be 5-FU 2400 mg/m2,  mg.m2, Irinotecan 150 mg/m2 + Bevacizumab 5 mg/kg. Will give Zarxio 480 mcg daily for 3 days.  Time = 35 min

## 2024-04-16 NOTE — HISTORY OF PRESENT ILLNESS
[de-identified] : Mr. Mccarthy was found to have mild iron deficiency anemia of routine blood work at the end of 2020. Colonoscopy revealed a large tumor in the proximal transverse colon. He had no GI symptoms. A CT of the chest, abdomen and pelvis in January, 2021 revealed no evidence of metastatic disease.  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 There was lymphovascular invasion, with 0 of 14 lymph nodes involved with cancer ([id number], high-risk stage II). Proficient mismatch repair.  He received adjuvant capecitabine x 6 months 3/4/21-end of August 2021 with Dr. Lula Sumner, with minimal toxicity. His CEA was persistently elevated near 7, with multiple negative scans.  ctDNA testing in January, 2022 was unrevealing.  In August, 2022 CT scans showed several enlarging paraaortic lymph nodes (reference node 2.3 x 1.1cm, previously 1.4 x 0.5cm) and two (bilateral) subcentimeter lung nodules. LYMPH NODE BIOPSY AND CYTOLOGIC PREPARATION (ABDOMINAL AORTOCAVAL LYMPH NODE): - METASTATIC ADENOCARCINOMA WITH NECROSIS; MORPHOLOGICALLY CONSISTENT WITH PATIENT'S PREVIOUSLY DIAGNOSED COLONIC ADENOCARCINOMA.  Mr. Mccarthy has been back to Dr. Sumner, was offered FOLFOX + bevacizumab. [place] has [place] [facility] [place] offered him watchful waiting versus irinotecan. His friend, Norbert Borjas is my patient, and suggested he see me as third opinion. Found to have stable aortocaval lymph nodes previously noted on the PET scan on CT imaging at the end of October, 2022.  An IR-guided biopsy of the aortocaval node shortly thereafter confirmed metastatic adenocarcinoma morphologically equivalent to his known colon primary. CT scan completed January, 2023 showed increase in size and number of lung metastases. New liver lesions suspicious for metastatic disease. Increase in size of a periceliac lymph node. Other retroperitoneal lymph nodes are stable.  Proceeded to XELOX + bevacizumab.  2/20/24 CAT Scan CAP COMPARISON: CT chest/abdomen/pelvis 10/31/2023 and 7/24/2023. LUNGS AND LARGE AIRWAYS: Patent central airways. Bilateral pulmonary nodules, many of which are cavitary, increased in size compared to prior. Reference: Right upper lobe nodule (3-49), 1.6 x 1.4 cm, previously 1.3 x 1.2 cm. Right upper lobe nodule (3-43), 1.5 cm, previously 1.1 cm, with increased cavitation. Left lower lobe nodule (3-24), 1.1 cm, previously 0.9 cm. PLEURA: No pleural effusion. VESSELS: Atherosclerotic changes of the coronary arteries. HEART: Heart size is normal. No pericardial effusion. MEDIASTINUM AND SONIA: Prominent right hilar lymph node (2-65), 1.1 x 1.1 cm, previously 1.4 x 1.1 cm. CHEST WALL AND LOWER NECK: Right anterior chest wall infusion port with tip in the SVC. LIVER: Caudate lobe hypoattenuating lesion (3-135), 1.5 x 1.2 cm, previously 1.6 x 1.1 cm. BILE DUCTS: Normal caliber. GALLBLADDER: Cholecystectomy. SPLEEN: Within normal limits. PANCREAS: Within normal limits. ADRENALS: Within normal limits. KIDNEYS/URETERS: Bilateral symmetric enhancement. No hydronephrosis. Punctate nonobstructing left renal interpole calculus. BLADDER: Minimally distended. REPRODUCTIVE ORGANS: Prostate within normal limits. BOWEL: Small hiatal hernia. Right hemicolectomy. No bowel obstruction. PERITONEUM: No ascites. Cystic lesion adjacent to the 4th portion of the duodenum (3-181), 1.9 x 1.5 cm, may represent lymphangioma or duplication cyst. VESSELS: Atherosclerotic changes. Left hepatic artery arising from the left gastric artery, anatomic variant. RETROPERITONEUM/LYMPH NODES: Increased retroperitoneal soft tissue with encasement of the the celiac axis, proximal common hepatic and left gastric arteries.  Partially calcified left para-aortic node (3-146), 2.1 x 1 cm, stable. ABDOMINAL WALL: Postsurgical changes. Tiny fat-containing umbilical hernia. BONES: Degenerative changes. IMPRESSION: Bilateral pulmonary nodules, many of which are cavitary, increased in size compared to prior 10/31/2023. Increased retroperitoneal soft tissue with encasement of the celiac axis, proximal common hepatic and left gastric arteries. Stable hepatic lesion.  Review of labs from 2/27/2024: CEA = 57.6, T bili = 2.8, Gluc = 221. Xeloda 8 pills 2 weeks on / 1 week off; started 11/16/2023 and last dose 2/29/24 AM. Avastin q 3 weeks, last dose = 2/27/24.  3/20/2024 Patient returned from vacation. Feels well and would like to move forward with treatment. We re-reviewed the "Randomized Phase 2 Study of DKN-01 Plus FOLFIRI/FOLFOX and Bevacizumab Versus FOLFIRI/FOLFOX and Bevacizumab as Second-line Treatment of Advanced Colorectal Cancer (DeFianCe)" He and his wife agreed to move forward and a consent process was performed. I have ordered MRI head and repeat imaging, as well as repeat labs. His Total bilirubin was elevated prior visit, and is now improving. There is evidence of Gilbert's disease in terms of heterozygous UGT1A1  Repeat bilirubin today decreased to 2.4 (was 3.4) Will review with sponsor about the bilirubin and if this level acceptable for eligibility.  4/9/2024 C1D1 FOLFIRI + Bevacizumab 4/1/24 5-FU 2400 mg/m2; 5-FU  mg/m2; Irinotecan 180 mg/m2 + Bevacizumab 5 mg/kg Patient has had FOLFOX + Korin in the past. We re-reviewed adverse events. He feels that his tolerance was pretty good. He notes that when is off of dexamethasone on day 4, that fatigue is increased. Stool - loose stool up to 3 times and did not need immodium. Hiccoughs - on day 2 and 3 of treatment, and will look into use of Emend since not on DKN-01  4/16/2024 C1D1 FOLFIRI + Bevacizumab 4/1/24 5-FU 2400 mg/m2; 5-FU  mg/m2; Irinotecan 180 mg/m2 + Bevacizumab 5 mg/kg Today is C2D1. Will be delayed 1 week due to ANC < 1000. Will change treatment dosing: Stop 5FU IVP, and lower irinotecan to 150. New treatment will be 5-FU 2400 mg/m2,  mg.m2, Irinotecan 150 mg/m2 + Bevacizumab 5 mg/kg. Will give Zarxio 480 mcg daily for 3 days.

## 2024-04-17 ENCOUNTER — APPOINTMENT (OUTPATIENT)
Dept: INFUSION THERAPY | Facility: HOSPITAL | Age: 59
End: 2024-04-17

## 2024-04-18 ENCOUNTER — APPOINTMENT (OUTPATIENT)
Dept: INFUSION THERAPY | Facility: HOSPITAL | Age: 59
End: 2024-04-18

## 2024-04-22 ENCOUNTER — LABORATORY RESULT (OUTPATIENT)
Age: 59
End: 2024-04-22

## 2024-04-22 ENCOUNTER — NON-APPOINTMENT (OUTPATIENT)
Age: 59
End: 2024-04-22

## 2024-04-22 LAB
BASOPHILS # BLD AUTO: 0 K/UL
BASOPHILS NFR BLD AUTO: 0 %
EOSINOPHIL # BLD AUTO: 0 K/UL
EOSINOPHIL NFR BLD AUTO: 0 %
HCT VFR BLD CALC: 41.6 %
HGB BLD-MCNC: 14.7 G/DL
LYMPHOCYTES # BLD AUTO: 2.33 K/UL
LYMPHOCYTES NFR BLD AUTO: 22.6 %
MAN DIFF?: NORMAL
MCHC RBC-ENTMCNC: 33.9 PG
MCHC RBC-ENTMCNC: 35.3 GM/DL
MCV RBC AUTO: 96.1 FL
MONOCYTES # BLD AUTO: 0.9 K/UL
MONOCYTES NFR BLD AUTO: 8.7 %
NEUTROPHILS # BLD AUTO: 6.36 K/UL
NEUTROPHILS NFR BLD AUTO: 60.8 %
PLATELET # BLD AUTO: 89 K/UL
RBC # BLD: 4.33 M/UL
RBC # FLD: 14.2 %
WBC # FLD AUTO: 10.31 K/UL

## 2024-04-23 ENCOUNTER — APPOINTMENT (OUTPATIENT)
Dept: INFUSION THERAPY | Facility: HOSPITAL | Age: 59
End: 2024-04-23

## 2024-04-23 ENCOUNTER — APPOINTMENT (OUTPATIENT)
Dept: HEMATOLOGY ONCOLOGY | Facility: CLINIC | Age: 59
End: 2024-04-23
Payer: COMMERCIAL

## 2024-04-23 ENCOUNTER — RESULT REVIEW (OUTPATIENT)
Age: 59
End: 2024-04-23

## 2024-04-23 LAB — CEA SERPL-MCNC: 39.7 NG/ML — HIGH (ref 0–3.8)

## 2024-04-23 PROCEDURE — G2211 COMPLEX E/M VISIT ADD ON: CPT

## 2024-04-23 PROCEDURE — 93010 ELECTROCARDIOGRAM REPORT: CPT

## 2024-04-23 PROCEDURE — 99214 OFFICE O/P EST MOD 30 MIN: CPT

## 2024-04-23 PROCEDURE — 93005 ELECTROCARDIOGRAM TRACING: CPT

## 2024-04-23 NOTE — HISTORY OF PRESENT ILLNESS
[de-identified] : Mr. Mccarthy was found to have mild iron deficiency anemia of routine blood work at the end of 2020. Colonoscopy revealed a large tumor in the proximal transverse colon. He had no GI symptoms. A CT of the chest, abdomen and pelvis in January, 2021 revealed no evidence of metastatic disease.  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 There was lymphovascular invasion, with 0 of 14 lymph nodes involved with cancer ([id number], high-risk stage II). Proficient mismatch repair.  He received adjuvant capecitabine x 6 months 3/4/21-end of August 2021 with Dr. Lula Sumner, with minimal toxicity. His CEA was persistently elevated near 7, with multiple negative scans.  ctDNA testing in January, 2022 was unrevealing.  In August, 2022 CT scans showed several enlarging paraaortic lymph nodes (reference node 2.3 x 1.1cm, previously 1.4 x 0.5cm) and two (bilateral) subcentimeter lung nodules. LYMPH NODE BIOPSY AND CYTOLOGIC PREPARATION (ABDOMINAL AORTOCAVAL LYMPH NODE): - METASTATIC ADENOCARCINOMA WITH NECROSIS; MORPHOLOGICALLY CONSISTENT WITH PATIENT'S PREVIOUSLY DIAGNOSED COLONIC ADENOCARCINOMA.  Mr. Mccarthy has been back to Dr. Sumner, was offered FOLFOX + bevacizumab. He has also seen doctors from [facility] [place], who offered him watchful waiting versus irinotecan. His friend, Norbert Borjas is my patient, and suggested he see me as third opinion. Found to have stable aortocaval lymph nodes previously noted on the PET scan on CT imaging at the end of October, 2022.  An IR-guided biopsy of the aortocaval node shortly thereafter confirmed metastatic adenocarcinoma morphologically equivalent to his known colon primary. CT scan completed January, 2023 showed increase in size and number of lung metastases. New liver lesions suspicious for metastatic disease. Increase in size of a periceliac lymph node. Other retroperitoneal lymph nodes are stable.  Proceeded to XELOX + bevacizumab.  2/20/24 CAT Scan CAP COMPARISON: CT chest/abdomen/pelvis 10/31/2023 and 7/24/2023. LUNGS AND LARGE AIRWAYS: Patent central airways. Bilateral pulmonary nodules, many of which are cavitary, increased in size compared to prior. Reference: Right upper lobe nodule (3-49), 1.6 x 1.4 cm, previously 1.3 x 1.2 cm. Right upper lobe nodule (3-43), 1.5 cm, previously 1.1 cm, with increased cavitation. Left lower lobe nodule (3-24), 1.1 cm, previously 0.9 cm. PLEURA: No pleural effusion. VESSELS: Atherosclerotic changes of the coronary arteries. HEART: Heart size is normal. No pericardial effusion. MEDIASTINUM AND SONIA: Prominent right hilar lymph node (2-65), 1.1 x 1.1 cm, previously 1.4 x 1.1 cm. CHEST WALL AND LOWER NECK: Right anterior chest wall infusion port with tip in the SVC. LIVER: Caudate lobe hypoattenuating lesion (3-135), 1.5 x 1.2 cm, previously 1.6 x 1.1 cm. BILE DUCTS: Normal caliber. GALLBLADDER: Cholecystectomy. SPLEEN: Within normal limits. PANCREAS: Within normal limits. ADRENALS: Within normal limits. KIDNEYS/URETERS: Bilateral symmetric enhancement. No hydronephrosis. Punctate nonobstructing left renal interpole calculus. BLADDER: Minimally distended. REPRODUCTIVE ORGANS: Prostate within normal limits. BOWEL: Small hiatal hernia. Right hemicolectomy. No bowel obstruction. PERITONEUM: No ascites. Cystic lesion adjacent to the 4th portion of the duodenum (3-181), 1.9 x 1.5 cm, may represent lymphangioma or duplication cyst. VESSELS: Atherosclerotic changes. Left hepatic artery arising from the left gastric artery, anatomic variant. RETROPERITONEUM/LYMPH NODES: Increased retroperitoneal soft tissue with encasement of the the celiac axis, proximal common hepatic and left gastric arteries.  Partially calcified left para-aortic node (3-146), 2.1 x 1 cm, stable. ABDOMINAL WALL: Postsurgical changes. Tiny fat-containing umbilical hernia. BONES: Degenerative changes. IMPRESSION: Bilateral pulmonary nodules, many of which are cavitary, increased in size compared to prior 10/31/2023. Increased retroperitoneal soft tissue with encasement of the celiac axis, proximal common hepatic and left gastric arteries. Stable hepatic lesion.  Review of labs from 2/27/2024: CEA = 57.6, T bili = 2.8, Gluc = 221. Xeloda 8 pills 2 weeks on / 1 week off; started 11/16/2023 and last dose 2/29/24 AM. Avastin q 3 weeks, last dose = 2/27/24.  3/20/2024 Patient returned from vacation. Feels well and would like to move forward with treatment. We re-reviewed the "Randomized Phase 2 Study of DKN-01 Plus FOLFIRI/FOLFOX and Bevacizumab Versus FOLFIRI/FOLFOX and Bevacizumab as Second-line Treatment of Advanced Colorectal Cancer (DeFianCe)" He and his wife agreed to move forward and a consent process was performed. I have ordered MRI head and repeat imaging, as well as repeat labs. His Total bilirubin was elevated prior visit, and is now improving. There is evidence of Gilbert's disease in terms of heterozygous UGT1A1  Repeat bilirubin today decreased to 2.4 (was 3.4) Will review with sponsor about the bilirubin and if this level acceptable for eligibility.  4/9/2024 C1D1 FOLFIRI + Bevacizumab 4/1/24 5-FU 2400 mg/m2; 5-FU  mg/m2; Irinotecan 180 mg/m2 + Bevacizumab 5 mg/kg Patient has had FOLFOX + Jennifer in the past. We re-reviewed adverse events. He feels that his tolerance was pretty good. He notes that when is off of dexamethasone on day 4, that fatigue is increased. Stool - loose stool up to 3 times and did not need immodium. Hiccoughs - on day 2 and 3 of treatment, and will look into use of Emend since not on DKN-01  4/16/2024 C1D1 FOLFIRI + Bevacizumab 4/1/24 5-FU 2400 mg/m2; 5-FU  mg/m2; Irinotecan 180 mg/m2 + Bevacizumab 5 mg/kg Today is C2D1. Will be delayed 1 week due to ANC < 1000. Will change treatment dosing: Stop 5FU IVP, and lower irinotecan to 150. New treatment will be 5-FU 2400 mg/m2,  mg.m2, Irinotecan 150 mg/m2 + Bevacizumab 5 mg/kg. Will give Zarxio 480 mcg daily for 3 days.  04/23 C2D1 FOLFIRI + JENNIFER today 5-FU 2400 mg/m2; Irinotecan 150 mg/m2; Jennifer 5 mg/kg. Doing relatively well today. Admits to diarrhea during one week of his treatment which has subsided. At most had 1-3 bowel movements on those days.  Did not take Imodium. He noticed some mild lower back pain 2/2 from Zarxio injection.  He did take Claritin on days of treatment + PRN advil. Last week was having pain in his gum and was found to have an infected crown. His dentist placed him on Amoxicillin which he completed. No other major adverse events from treatment.

## 2024-04-23 NOTE — PHYSICAL EXAM
[Fully active, able to carry on all pre-disease performance without restriction] : Status 0 - Fully active, able to carry on all pre-disease performance without restriction [Normal] : affect appropriate [de-identified] : anicteric [de-identified] : right chest wall mediport c/d/i [de-identified] : 3x3 non blanching macule on left elbow, PPE grade 2 on hands

## 2024-04-23 NOTE — ASSESSMENT
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 There was lymphovascular invasion, with 0 of 14 lymph nodes involved with cancer ([id number], high-risk stage II). Proficient mismatch repair.   He received adjuvant capecitabine x 6 months 3/4/21-end of August 2021.  Genetic testing MyRisk = negative.  CAT scans 8/2023 showed portal nodes, and IR biospy on 8/22/22 is positive for relapsed metastatic adenocarcinoma. F1CDx pan-LEONARD wild type, TMB = 4, TPS = 0%.  XELOX + bevacizumab started 2/7/2023. oxaliplatin 130 mg/m2 + bevacizumab 10 mg/kg + capecitabine q3 week cycles. He has 8 cycles, the last of which was 7/5/2023. He was then on maintenance therapy with Avastin (last dose 2/27/24) + Xeloda (last 2/29/24 AM)  We re-reviewed the "Randomized Phase 2 Study of DKN-01 Plus FOLFIRI/FOLFOX and Bevacizumab Versus FOLFIRI/FOLFOX and Bevacizumab as Second-line Treatment of Advanced Colorectal Cancer (DeFianCe)" He and his wife agreed to move forward and a consent process was performed. I have ordered MRI head and repeat imaging, as well as repeat labs.  FOLFIRI + Bevacizumab: C1D1 FOLFIRI + Bevacizumab 4/1/24 5-FU 2400 mg/m2; 5-FU  mg/m2; Irinotecan 180 mg/m2 + Bevacizumab 5 mg/kg C2D1 04/23/2024  C2D1 FOLFIRI + KORIN today. Delayed one week due to neutropenia. Was given Granulocyte colony-stimulating factor x 3 days. 5-FU 2400 mg/m2; Irinotecan 150 mg/m2; Korin 5 mg/kg.  Admits to diarrhea during one week of his treatment which has subsided. At most had 1-3 bowel movements on those days.  Did not take Imodium.  He noticed some mild lower back pain 2/2 from Zarxio injection.  He did take Claritin on days of treatment + PRN advil.  Last week was having pain in his gum and was found to have an infected crown. His dentist placed him on Amoxicillin which he completed.  No other major adverse events from treatment.  Patient will go to local [facility] in [facility] 2-3 days prior to chemotherapy.  f/u q cycle as per research.  Dr. Chavez agrees with above plan.

## 2024-04-25 ENCOUNTER — APPOINTMENT (OUTPATIENT)
Dept: INFUSION THERAPY | Facility: HOSPITAL | Age: 59
End: 2024-04-25

## 2024-04-26 ENCOUNTER — OUTPATIENT (OUTPATIENT)
Dept: OUTPATIENT SERVICES | Facility: HOSPITAL | Age: 59
LOS: 1 days | Discharge: HOME | End: 2024-04-26
Payer: COMMERCIAL

## 2024-04-26 DIAGNOSIS — Z98.890 OTHER SPECIFIED POSTPROCEDURAL STATES: Chronic | ICD-10-CM

## 2024-04-26 DIAGNOSIS — C18.9 MALIGNANT NEOPLASM OF COLON, UNSPECIFIED: ICD-10-CM

## 2024-04-26 DIAGNOSIS — Z90.49 ACQUIRED ABSENCE OF OTHER SPECIFIED PARTS OF DIGESTIVE TRACT: Chronic | ICD-10-CM

## 2024-04-27 ENCOUNTER — EMERGENCY (EMERGENCY)
Facility: HOSPITAL | Age: 59
LOS: 0 days | Discharge: HOME | End: 2024-04-27
Attending: STUDENT IN AN ORGANIZED HEALTH CARE EDUCATION/TRAINING PROGRAM
Payer: COMMERCIAL

## 2024-04-27 VITALS
DIASTOLIC BLOOD PRESSURE: 80 MMHG | HEART RATE: 86 BPM | RESPIRATION RATE: 18 BRPM | SYSTOLIC BLOOD PRESSURE: 133 MMHG | TEMPERATURE: 98 F | OXYGEN SATURATION: 99 %

## 2024-04-27 VITALS
TEMPERATURE: 98 F | SYSTOLIC BLOOD PRESSURE: 88 MMHG | HEIGHT: 67.72 IN | RESPIRATION RATE: 16 BRPM | HEART RATE: 98 BPM | OXYGEN SATURATION: 98 % | DIASTOLIC BLOOD PRESSURE: 65 MMHG

## 2024-04-27 DIAGNOSIS — Z98.890 OTHER SPECIFIED POSTPROCEDURAL STATES: Chronic | ICD-10-CM

## 2024-04-27 DIAGNOSIS — R42 DIZZINESS AND GIDDINESS: ICD-10-CM

## 2024-04-27 DIAGNOSIS — R03.1 NONSPECIFIC LOW BLOOD-PRESSURE READING: ICD-10-CM

## 2024-04-27 DIAGNOSIS — Z90.49 ACQUIRED ABSENCE OF OTHER SPECIFIED PARTS OF DIGESTIVE TRACT: Chronic | ICD-10-CM

## 2024-04-27 DIAGNOSIS — R55 SYNCOPE AND COLLAPSE: ICD-10-CM

## 2024-04-27 DIAGNOSIS — M54.50 LOW BACK PAIN, UNSPECIFIED: ICD-10-CM

## 2024-04-27 DIAGNOSIS — C18.9 MALIGNANT NEOPLASM OF COLON, UNSPECIFIED: ICD-10-CM

## 2024-04-27 LAB
ALBUMIN SERPL ELPH-MCNC: 3.4 G/DL — SIGNIFICANT CHANGE UP (ref 3.3–5)
ALP SERPL-CCNC: 79 U/L — SIGNIFICANT CHANGE UP (ref 40–120)
ALT FLD-CCNC: 71 U/L — SIGNIFICANT CHANGE UP (ref 12–78)
ANION GAP SERPL CALC-SCNC: 9 MMOL/L — SIGNIFICANT CHANGE UP (ref 5–17)
APPEARANCE UR: CLEAR — SIGNIFICANT CHANGE UP
APTT BLD: 21.8 SEC — LOW (ref 24.5–35.6)
AST SERPL-CCNC: 49 U/L — HIGH (ref 15–37)
BACTERIA # UR AUTO: NEGATIVE /HPF — SIGNIFICANT CHANGE UP
BASOPHILS # BLD AUTO: 0.03 K/UL — SIGNIFICANT CHANGE UP (ref 0–0.2)
BASOPHILS NFR BLD AUTO: 0.4 % — SIGNIFICANT CHANGE UP (ref 0–2)
BILIRUB SERPL-MCNC: 2.1 MG/DL — HIGH (ref 0.2–1.2)
BILIRUB UR-MCNC: NEGATIVE — SIGNIFICANT CHANGE UP
BUN SERPL-MCNC: 30 MG/DL — HIGH (ref 7–23)
CALCIUM SERPL-MCNC: 9.2 MG/DL — SIGNIFICANT CHANGE UP (ref 8.5–10.1)
CAST: 9 /LPF — HIGH (ref 0–4)
CHLORIDE SERPL-SCNC: 101 MMOL/L — SIGNIFICANT CHANGE UP (ref 96–108)
CO2 SERPL-SCNC: 23 MMOL/L — SIGNIFICANT CHANGE UP (ref 22–31)
COLOR SPEC: SIGNIFICANT CHANGE UP
CREAT SERPL-MCNC: 1.34 MG/DL — HIGH (ref 0.5–1.3)
DIFF PNL FLD: NEGATIVE — SIGNIFICANT CHANGE UP
EGFR: 62 ML/MIN/1.73M2 — SIGNIFICANT CHANGE UP
EOSINOPHIL # BLD AUTO: 0.08 K/UL — SIGNIFICANT CHANGE UP (ref 0–0.5)
EOSINOPHIL NFR BLD AUTO: 0.9 % — SIGNIFICANT CHANGE UP (ref 0–6)
GLUCOSE SERPL-MCNC: 195 MG/DL — HIGH (ref 70–99)
GLUCOSE UR QL: NEGATIVE MG/DL — SIGNIFICANT CHANGE UP
HCT VFR BLD CALC: 42 % — SIGNIFICANT CHANGE UP (ref 39–50)
HGB BLD-MCNC: 15.2 G/DL — SIGNIFICANT CHANGE UP (ref 13–17)
IMM GRANULOCYTES NFR BLD AUTO: 1.1 % — HIGH (ref 0–0.9)
INR BLD: 0.93 RATIO — SIGNIFICANT CHANGE UP (ref 0.85–1.18)
KETONES UR-MCNC: NEGATIVE MG/DL — SIGNIFICANT CHANGE UP
LEUKOCYTE ESTERASE UR-ACNC: NEGATIVE — SIGNIFICANT CHANGE UP
LYMPHOCYTES # BLD AUTO: 1.49 K/UL — SIGNIFICANT CHANGE UP (ref 1–3.3)
LYMPHOCYTES # BLD AUTO: 17.5 % — SIGNIFICANT CHANGE UP (ref 13–44)
MAGNESIUM SERPL-MCNC: 2.2 MG/DL — SIGNIFICANT CHANGE UP (ref 1.6–2.6)
MCHC RBC-ENTMCNC: 33.9 PG — SIGNIFICANT CHANGE UP (ref 27–34)
MCHC RBC-ENTMCNC: 36.2 GM/DL — HIGH (ref 32–36)
MCV RBC AUTO: 93.5 FL — SIGNIFICANT CHANGE UP (ref 80–100)
MONOCYTES # BLD AUTO: 0.1 K/UL — SIGNIFICANT CHANGE UP (ref 0–0.9)
MONOCYTES NFR BLD AUTO: 1.2 % — LOW (ref 2–14)
NEUTROPHILS # BLD AUTO: 6.71 K/UL — SIGNIFICANT CHANGE UP (ref 1.8–7.4)
NEUTROPHILS NFR BLD AUTO: 78.9 % — HIGH (ref 43–77)
NITRITE UR-MCNC: NEGATIVE — SIGNIFICANT CHANGE UP
PH UR: 5.5 — SIGNIFICANT CHANGE UP (ref 5–8)
PLATELET # BLD AUTO: 204 K/UL — SIGNIFICANT CHANGE UP (ref 150–400)
POTASSIUM SERPL-MCNC: 4.1 MMOL/L — SIGNIFICANT CHANGE UP (ref 3.5–5.3)
POTASSIUM SERPL-SCNC: 4.1 MMOL/L — SIGNIFICANT CHANGE UP (ref 3.5–5.3)
PROT SERPL-MCNC: 6.9 GM/DL — SIGNIFICANT CHANGE UP (ref 6–8.3)
PROT UR-MCNC: 30 MG/DL
PROTHROM AB SERPL-ACNC: 10.5 SEC — SIGNIFICANT CHANGE UP (ref 9.5–13)
RBC # BLD: 4.49 M/UL — SIGNIFICANT CHANGE UP (ref 4.2–5.8)
RBC # FLD: 14.1 % — SIGNIFICANT CHANGE UP (ref 10.3–14.5)
RBC CASTS # UR COMP ASSIST: 2 /HPF — SIGNIFICANT CHANGE UP (ref 0–4)
SODIUM SERPL-SCNC: 133 MMOL/L — LOW (ref 135–145)
SP GR SPEC: 1.02 — SIGNIFICANT CHANGE UP (ref 1–1.03)
SQUAMOUS # UR AUTO: 2 /HPF — SIGNIFICANT CHANGE UP (ref 0–5)
TROPONIN I, HIGH SENSITIVITY RESULT: 4.91 NG/L — SIGNIFICANT CHANGE UP
TROPONIN I, HIGH SENSITIVITY RESULT: 4.94 NG/L — SIGNIFICANT CHANGE UP
UROBILINOGEN FLD QL: 1 MG/DL — SIGNIFICANT CHANGE UP (ref 0.2–1)
WBC # BLD: 8.5 K/UL — SIGNIFICANT CHANGE UP (ref 3.8–10.5)
WBC # FLD AUTO: 8.5 K/UL — SIGNIFICANT CHANGE UP (ref 3.8–10.5)
WBC UR QL: 1 /HPF — SIGNIFICANT CHANGE UP (ref 0–5)

## 2024-04-27 PROCEDURE — 71045 X-RAY EXAM CHEST 1 VIEW: CPT

## 2024-04-27 PROCEDURE — 85025 COMPLETE CBC W/AUTO DIFF WBC: CPT

## 2024-04-27 PROCEDURE — 83735 ASSAY OF MAGNESIUM: CPT

## 2024-04-27 PROCEDURE — 85610 PROTHROMBIN TIME: CPT

## 2024-04-27 PROCEDURE — 84484 ASSAY OF TROPONIN QUANT: CPT

## 2024-04-27 PROCEDURE — 93010 ELECTROCARDIOGRAM REPORT: CPT

## 2024-04-27 PROCEDURE — 99285 EMERGENCY DEPT VISIT HI MDM: CPT | Mod: 25

## 2024-04-27 PROCEDURE — 87086 URINE CULTURE/COLONY COUNT: CPT

## 2024-04-27 PROCEDURE — 71045 X-RAY EXAM CHEST 1 VIEW: CPT | Mod: 26

## 2024-04-27 PROCEDURE — 93005 ELECTROCARDIOGRAM TRACING: CPT

## 2024-04-27 PROCEDURE — 80053 COMPREHEN METABOLIC PANEL: CPT

## 2024-04-27 PROCEDURE — 36415 COLL VENOUS BLD VENIPUNCTURE: CPT

## 2024-04-27 PROCEDURE — 81001 URINALYSIS AUTO W/SCOPE: CPT

## 2024-04-27 PROCEDURE — 85730 THROMBOPLASTIN TIME PARTIAL: CPT

## 2024-04-27 PROCEDURE — 99285 EMERGENCY DEPT VISIT HI MDM: CPT

## 2024-04-27 RX ORDER — SODIUM CHLORIDE 9 MG/ML
1000 INJECTION INTRAMUSCULAR; INTRAVENOUS; SUBCUTANEOUS ONCE
Refills: 0 | Status: COMPLETED | OUTPATIENT
Start: 2024-04-27 | End: 2024-04-27

## 2024-04-27 RX ADMIN — SODIUM CHLORIDE 1000 MILLILITER(S): 9 INJECTION INTRAMUSCULAR; INTRAVENOUS; SUBCUTANEOUS at 13:20

## 2024-04-27 RX ADMIN — SODIUM CHLORIDE 1000 MILLILITER(S): 9 INJECTION INTRAMUSCULAR; INTRAVENOUS; SUBCUTANEOUS at 12:41

## 2024-04-27 NOTE — ED ADULT NURSE NOTE - NSFALLUNIVINTERV_ED_ALL_ED
Bed/Stretcher in lowest position, wheels locked, appropriate side rails in place/Call bell, personal items and telephone in reach/Instruct patient to call for assistance before getting out of bed/chair/stretcher/Non-slip footwear applied when patient is off stretcher/[place] to call system/Physically safe environment - no spills, clutter or unnecessary equipment/Purposeful proactive rounding/Room/bathroom lighting operational, light cord in reach

## 2024-04-27 NOTE — ED ADULT TRIAGE NOTE - CHIEF COMPLAINT QUOTE
Patient presents to the ER after possible syncopal episode while standing up from a bath. Patient states is he unsure if he passed out, but denies any complaints at this time. Hypotensive in triage, on chemo.

## 2024-04-27 NOTE — ED PROVIDER NOTE - CPE EDP RESP NORM
Patient resolved from Transition of Care episode on 2/2/2021. ACM/CTN was unsuccessful at contacting this patient today. Patient/family was provided the following resources and education related to COVID-19 during the initial call:  
           
          Signs, symptoms and red flags related to COVID-19 CDC exposure and quarantine guidelines [facility] exposure contact - [phone] Contact for their local Department of Health Patient has not had any additional ED or hospital visits. No further outreach scheduled with this CTN/ACM. Episode of Care resolved. Patient has this CTN/ACM contact information if future needs arise.
normal...

## 2024-04-27 NOTE — ED PROVIDER NOTE - NSFOLLOWUPINSTRUCTIONS_ED_ALL_ED_FT
Near-Syncope  Near-syncope is when you suddenly feel like you might pass out (faint), but you do not actually lose consciousness. This may also be referred to as presyncope. During an episode of near-syncope, you may:  Feel dizzy, weak, or light-headed.Feel nauseous.See all white or all black in your field of vision, or see spots.Have cold, clammy skin.This condition is caused by a sudden decrease in blood flow to the brain. This decrease can result from various causes, but most of those causes are not dangerous. However, near-syncope may be a sign of a serious medical problem, so it is important to seek medical care.  Follow these instructions at home:  Medicines     Take over-the-counter and prescription medicines only as told by your health care provider.If you are taking blood pressure or heart medicine, get up slowly and take several minutes to sit and then stand. This can reduce dizziness.General instructions     Pay attention to any changes in your symptoms.Talk with your health care provider about your symptoms. You may need to have testing to understand the cause of your near-syncope.If you start to feel like you might faint, lie down right away and raise (elevate) your feet above the level of your heart. Breathe deeply and steadily. Wait until all of the symptoms have passed.Have someone stay with you until you feel stable.Do not drive, use machinery, or play sports until your health care provider says it is okay.Drink enough fluid to keep your urine pale yellow.Keep all follow-up visits as told by your health care provider. This is important.Get help right away if you:  Have a seizure.Have unusual pain in your chest, abdomen, or back.Faint once or repeatedly.Have a severe headache.Are bleeding from your mouth or rectum, or you have black or tarry stool.Have a very fast or irregular heartbeat (palpitations).Are confused.Have trouble walking.Have severe weakness.Have vision problems.These symptoms may represent a serious problem that is an emergency. Do not wait to see if your symptoms will go away. Get medical help right away. Call your local emergency services (911 in the [place] Do not drive yourself to the hospital.   Summary  Near-syncope is when you suddenly feel like you might pass out (faint), but you do not actually lose consciousness.This condition is caused by a sudden decrease in blood flow to the brain. This decrease can result from various causes, but most of those causes are not dangerous.Near-syncope may be a sign of a serious medical problem, so it is important to seek medical care.This information is not intended to replace advice given to you by your health care provider. Make sure you discuss any questions you have with your health care provider. Near-Syncope  Near-syncope is when you suddenly feel like you might pass out (faint), but you do not actually lose consciousness. This may also be referred to as presyncope. During an episode of near-syncope, you may:  Feel dizzy, weak, or light-headed.Feel nauseous.See all white or all black in your field of vision, or see spots.Have cold, clammy skin.This condition is caused by a sudden decrease in blood flow to the brain. This decrease can result from various causes, but most of those causes are not dangerous. However, near-syncope may be a sign of a serious medical problem, so it is important to seek medical care.  Follow these instructions at home:  Medicines     Take over-the-counter and prescription medicines only as told by your health care provider.If you are taking blood pressure or heart medicine, get up slowly and take several minutes to sit and then stand. This can reduce dizziness.General instructions     Follow up with primary doctor within 24 hours to have creatinine/kidney function rechecked  Return to the Emergency Department for worsening or persistent symptoms, and/or ANY NEW OR CONCERNING SYMPTOMS. If you have issues obtaining follow up, please call: [phone] ([phone] or [phone]  to obtain a doctor or specialist who takes your insurance in your area.      Pay attention to any changes in your symptoms.Talk with your health care provider about your symptoms. You may need to have testing to understand the cause of your near-syncope.If you start to feel like you might faint, lie down right away and raise (elevate) your feet above the level of your heart. Breathe deeply and steadily. Wait until all of the symptoms have passed.Have someone stay with you until you feel stable.Do not drive, use machinery, or play sports until your health care provider says it is okay.Drink enough fluid to keep your urine pale yellow.Keep all follow-up visits as told by your health care provider. This is important.Get help right away if you:  Have a seizure.Have unusual pain in your chest, abdomen, or back.Faint once or repeatedly.Have a severe headache.Are bleeding from your mouth or rectum, or you have black or tarry stool.Have a very fast or irregular heartbeat (palpitations).Are confused.Have trouble walking.Have severe weakness.Have vision problems.These symptoms may represent a serious problem that is an emergency. Do not wait to see if your symptoms will go away. Get medical help right away. Call your local emergency services (911 in the [place] Do not drive yourself to the hospital.   Summary  Near-syncope is when you suddenly feel like you might pass out (faint), but you do not actually lose consciousness.This condition is caused by a sudden decrease in blood flow to the brain. This decrease can result from various causes, but most of those causes are not dangerous.Near-syncope may be a sign of a serious medical problem, so it is important to seek medical care.This information is not intended to replace advice given to you by your health care provider. Make sure you discuss any questions you have with your health care provider.

## 2024-04-27 NOTE — ED ADULT NURSE NOTE - OBJECTIVE STATEMENT
Pt A&Ox4, presents to the ED s/p possible syncopal episode after bath. Unknown head strike. Denies AC use. Pt has no complaints at this time. PMH of stage 4 colon cancer, last chemo 4 days ago.

## 2024-04-27 NOTE — ED ADULT NURSE REASSESSMENT NOTE - NS ED NURSE REASSESS COMMENT FT1
received handoff report from Mary ARAUZ. safety precautions and comfort measures maintained. patient resting comfortably in bed. pending results and dispo.

## 2024-04-27 NOTE — ED ADULT NURSE NOTE - ALCOHOL PRE SCREEN (AUDIT - C)
Statement Selected no tinnitus/no ear pain/no vertigo/no hearing difficulty no tinnitus/no hearing difficulty/no ear pain/no vertigo/no sinus symptoms

## 2024-04-27 NOTE — ED PROVIDER NOTE - PATIENT PORTAL LINK FT
You can access the FollowMyHealth Patient Portal offered by [facility] by registering at the following website: [email or web address]. By joining [employer]’s FollowMyHealth portal, you will also be able to view your health information using other applications (apps) compatible with our system.

## 2024-04-27 NOTE — ED PROVIDER NOTE - CLINICAL SUMMARY MEDICAL DECISION MAKING FREE TEXT BOX
Patient is a 59-year-old male with history of stage IV colon cancer.  Patient with episode of near syncope after getting out of bathtub.   Patient found to be hypotensive via EMS and brought to the emergency department for evaluation.  Patient reports that he has history of hypotension after having chemotherapy which she had 4 days ago.  Patient feeling better upon arrival to ED at this time.  Patient denies any trauma or injury.  Will perform screening EKG, chest x-ray basic labs IV fluids urinalysis and reeval closely. Patient is a 59-year-old male with history of stage IV colon cancer.  Patient with episode of near syncope after getting out of bathtub.   Patient found to be hypotensive via EMS and brought to the emergency department for evaluation.  Patient reports that he has history of hypotension after having chemotherapy which she had 4 days ago.  Patient feeling better upon arrival to ED at this time.  Patient denies any trauma or injury.  Will perform screening EKG, chest x-ray basic labs IV fluids urinalysis and reeval closely.    Brody DO: Blood pressure improved at this time.  Diagnostics reviewed with patient at bedside.  Neuroexam nonfocal.  Patient with mild YUE on blood work.  Patient wanting to go home at this time.  Pending second troponin, urinalysis and reeval at this time. S/o to Dr. Ordonez at 2:30PM.

## 2024-04-27 NOTE — ED PROVIDER NOTE - OBJECTIVE STATEMENT
Patient is a 59-year-old male with history of stage IV colon cancer.  Patient's most recent chemo was 4 days ago.  Patient reports that he was outside gardening when he had lower back pain went into house to take a warm bath.  Patient reports that when he went to go get a warm bath felt lightheaded denies passing out.  No closed head injury.  No chest pain shortness of breath or back pain at this time.   Patient reports that he has history of low blood pressure after receiving chemo.  Patient feeling better upon arrival.  Patient with no further complaints.

## 2024-04-27 NOTE — ED PROVIDER NOTE - PROGRESS NOTE DETAILS
Repeat troponin WNL. Pt feeling very well, tolerating PO, vitals WNL. Offered admission for YUE but declines. Advised to stay well hydrated, f/u with pcp this week to have Cr rechecked. Given strict return precautions and discharged in stable condition with family

## 2024-04-28 LAB
CULTURE RESULTS: NO GROWTH — SIGNIFICANT CHANGE UP
SPECIMEN SOURCE: SIGNIFICANT CHANGE UP

## 2024-04-30 ENCOUNTER — APPOINTMENT (OUTPATIENT)
Dept: HEMATOLOGY ONCOLOGY | Facility: CLINIC | Age: 59
End: 2024-04-30

## 2024-04-30 ENCOUNTER — APPOINTMENT (OUTPATIENT)
Age: 59
End: 2024-04-30

## 2024-05-02 ENCOUNTER — NON-APPOINTMENT (OUTPATIENT)
Age: 59
End: 2024-05-02

## 2024-05-04 ENCOUNTER — LABORATORY RESULT (OUTPATIENT)
Age: 59
End: 2024-05-04

## 2024-05-05 LAB
ALBUMIN SERPL ELPH-MCNC: 3.8 G/DL
ALP BLD-CCNC: 79 U/L
ALT SERPL-CCNC: 47 U/L
ANION GAP SERPL CALC-SCNC: 12 MMOL/L
AST SERPL-CCNC: 30 U/L
BASOPHILS # BLD AUTO: 0.03 K/UL
BASOPHILS NFR BLD AUTO: 0.8 %
BILIRUB SERPL-MCNC: 1.2 MG/DL
BUN SERPL-MCNC: 9 MG/DL
CALCIUM SERPL-MCNC: 8.9 MG/DL
CEA SERPL-MCNC: 37.9 NG/ML
CHLORIDE SERPL-SCNC: 100 MMOL/L
CO2 SERPL-SCNC: 25 MMOL/L
CREAT SERPL-MCNC: 0.73 MG/DL
EGFR: 106 ML/MIN/1.73M2
EOSINOPHIL # BLD AUTO: 0.08 K/UL
EOSINOPHIL NFR BLD AUTO: 2.1 %
GLUCOSE SERPL-MCNC: 121 MG/DL
HCT VFR BLD CALC: 36.3 %
HGB BLD-MCNC: 12.5 G/DL
IMM GRANULOCYTES NFR BLD AUTO: 0 %
LYMPHOCYTES # BLD AUTO: 1.5 K/UL
LYMPHOCYTES NFR BLD AUTO: 40 %
MAN DIFF?: NORMAL
MCHC RBC-ENTMCNC: 33.2 PG
MCHC RBC-ENTMCNC: 34.4 GM/DL
MCV RBC AUTO: 96.3 FL
MONOCYTES # BLD AUTO: 0.43 K/UL
MONOCYTES NFR BLD AUTO: 11.5 %
NEUTROPHILS # BLD AUTO: 1.71 K/UL
NEUTROPHILS NFR BLD AUTO: 45.6 %
PLATELET # BLD AUTO: 143 K/UL
POTASSIUM SERPL-SCNC: 4.4 MMOL/L
PROT SERPL-MCNC: 5.9 G/DL
RBC # BLD: 3.77 M/UL
RBC # FLD: 15.6 %
SODIUM SERPL-SCNC: 137 MMOL/L
WBC # FLD AUTO: 3.75 K/UL

## 2024-05-06 ENCOUNTER — NON-APPOINTMENT (OUTPATIENT)
Age: 59
End: 2024-05-06

## 2024-05-07 ENCOUNTER — APPOINTMENT (OUTPATIENT)
Dept: HEMATOLOGY ONCOLOGY | Facility: CLINIC | Age: 59
End: 2024-05-07
Payer: COMMERCIAL

## 2024-05-07 ENCOUNTER — NON-APPOINTMENT (OUTPATIENT)
Age: 59
End: 2024-05-07

## 2024-05-07 ENCOUNTER — APPOINTMENT (OUTPATIENT)
Dept: INFUSION THERAPY | Facility: HOSPITAL | Age: 59
End: 2024-05-07

## 2024-05-07 PROCEDURE — 93010 ELECTROCARDIOGRAM REPORT: CPT

## 2024-05-07 PROCEDURE — 99214 OFFICE O/P EST MOD 30 MIN: CPT

## 2024-05-07 PROCEDURE — G2211 COMPLEX E/M VISIT ADD ON: CPT | Mod: NC,1L

## 2024-05-07 PROCEDURE — 93005 ELECTROCARDIOGRAM TRACING: CPT

## 2024-05-07 NOTE — ASSESSMENT
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 There was lymphovascular invasion, with 0 of 14 lymph nodes involved with cancer ([id number], high-risk stage II). Proficient mismatch repair.   He received adjuvant capecitabine x 6 months 3/4/21-end of August 2021.  Genetic testing MyRisk = negative.  CAT scans 8/2023 showed portal nodes, and IR biospy on 8/22/22 is positive for relapsed metastatic adenocarcinoma. F1CDx pan-LEONARD wild type, TMB = 4, TPS = 0%.  XELOX + bevacizumab started 2/7/2023. oxaliplatin 130 mg/m2 + bevacizumab 10 mg/kg + capecitabine q3 week cycles. He has 8 cycles, the last of which was 7/5/2023. He was then on maintenance therapy with Avastin (last dose 2/27/24) + Xeloda (last 2/29/24 AM)  We re-reviewed the "Randomized Phase 2 Study of DKN-01 Plus FOLFIRI/FOLFOX and Bevacizumab Versus FOLFIRI/FOLFOX and Bevacizumab as Second-line Treatment of Advanced Colorectal Cancer (DeFianCe)" He and his wife agreed to move forward and a consent process was performed. I have ordered MRI head and repeat imaging, as well as repeat labs.  FOLFIRI + Bevacizumab: C1D1 FOLFIRI + Bevacizumab 4/1/24 5-FU 2400 mg/m2; 5-FU  mg/m2; Irinotecan 180 mg/m2 + Bevacizumab 5 mg/kg C2D1 04/23/2024 C3D1 5/7/24  C3D1 FOLFIRI + JENNIFER today 5-FU 2400 mg/m2; Irinotecan 150 mg/m2; Jennifer 5 mg/kg..  [facility] ED visit: On D#5 of his cycle patient reports that he was outside gardening when he had lower back pain went into house to take a warm bath.  Patient reports that when he went to go get a warm bath felt lightheaded denies passing out.  No closed head injury.  No chest pain shortness of breath or back pain at this time.   Patient reports that he has history of low blood pressure after receiving chemo.   He was given IVF x 2 liters and sent home. His SCr was elevated 2/2 to pre-renal (dehydration). He felt better afterwards and was back to baseline. Due to syncopal episode, will give patient IVF here at [facility] on Friday, 5/10/24.  Diarrhea: Denies diarrhea now.  Generally admits to diarrhea x one week after chemotherapy and admits to 1-3 episodes on those days.  Will repeat CBC with diff during NILAM period next week at local [facility].  Patient will go to local [facility] in [facility] 2-3 days prior to chemotherapy.  f/u q cycle as per research.  Dr. Chavez agrees with above plan.

## 2024-05-07 NOTE — PHYSICAL EXAM
[Fully active, able to carry on all pre-disease performance without restriction] : Status 0 - Fully active, able to carry on all pre-disease performance without restriction [Normal] : affect appropriate [de-identified] : anicteric [de-identified] : right chest wall mediport c/d/i [de-identified] : 3x3 non blanching macule on left elbow, PPE grade 2 on hands

## 2024-05-07 NOTE — HISTORY OF PRESENT ILLNESS
[de-identified] : Mr. Mccarthy was found to have mild iron deficiency anemia of routine blood work at the end of 2020. Colonoscopy revealed a large tumor in the proximal transverse colon. He had no GI symptoms. A CT of the chest, abdomen and pelvis in January, 2021 revealed no evidence of metastatic disease.  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 There was lymphovascular invasion, with 0 of 14 lymph nodes involved with cancer ([id number], high-risk stage II). Proficient mismatch repair.  He received adjuvant capecitabine x 6 months 3/4/21-end of August 2021 with Dr. Lula Sumner, with minimal toxicity. His CEA was persistently elevated near 7, with multiple negative scans.  ctDNA testing in January, 2022 was unrevealing.  In August, 2022 CT scans showed several enlarging paraaortic lymph nodes (reference node 2.3 x 1.1cm, previously 1.4 x 0.5cm) and two (bilateral) subcentimeter lung nodules. LYMPH NODE BIOPSY AND CYTOLOGIC PREPARATION (ABDOMINAL AORTOCAVAL LYMPH NODE): - METASTATIC ADENOCARCINOMA WITH NECROSIS; MORPHOLOGICALLY CONSISTENT WITH PATIENT'S PREVIOUSLY DIAGNOSED COLONIC ADENOCARCINOMA.  Mr. Mccarthy has been back to Dr. Sumner, was offered FOLFOX + bevacizumab. He has also seen doctors from [facility], who offered him watchful waiting versus irinotecan. His friend, Norbert Borjas is my patient, and suggested he see me as third opinion. Found to have stable aortocaval lymph nodes previously noted on the PET scan on CT imaging at the end of October, 2022.  An IR-guided biopsy of the aortocaval node shortly thereafter confirmed metastatic adenocarcinoma morphologically equivalent to his known colon primary. CT scan completed January, 2023 showed increase in size and number of lung metastases. New liver lesions suspicious for metastatic disease. Increase in size of a periceliac lymph node. Other retroperitoneal lymph nodes are stable.  Proceeded to XELOX + bevacizumab.  2/20/24 CAT Scan CAP COMPARISON: CT chest/abdomen/pelvis 10/31/2023 and 7/24/2023. LUNGS AND LARGE AIRWAYS: Patent central airways. Bilateral pulmonary nodules, many of which are cavitary, increased in size compared to prior. Reference: Right upper lobe nodule (3-49), 1.6 x 1.4 cm, previously 1.3 x 1.2 cm. Right upper lobe nodule (3-43), 1.5 cm, previously 1.1 cm, with increased cavitation. Left lower lobe nodule (3-24), 1.1 cm, previously 0.9 cm. PLEURA: No pleural effusion. VESSELS: Atherosclerotic changes of the coronary arteries. HEART: Heart size is normal. No pericardial effusion. MEDIASTINUM AND SONIA: Prominent right hilar lymph node (2-65), 1.1 x 1.1 cm, previously 1.4 x 1.1 cm. CHEST WALL AND LOWER NECK: Right anterior chest wall infusion port with tip in the SVC. LIVER: Caudate lobe hypoattenuating lesion (3-135), 1.5 x 1.2 cm, previously 1.6 x 1.1 cm. BILE DUCTS: Normal caliber. GALLBLADDER: Cholecystectomy. SPLEEN: Within normal limits. PANCREAS: Within normal limits. ADRENALS: Within normal limits. KIDNEYS/URETERS: Bilateral symmetric enhancement. No hydronephrosis. Punctate nonobstructing left renal interpole calculus. BLADDER: Minimally distended. REPRODUCTIVE ORGANS: Prostate within normal limits. BOWEL: Small hiatal hernia. Right hemicolectomy. No bowel obstruction. PERITONEUM: No ascites. Cystic lesion adjacent to the 4th portion of the duodenum (3-181), 1.9 x 1.5 cm, may represent lymphangioma or duplication cyst. VESSELS: Atherosclerotic changes. Left hepatic artery arising from the left gastric artery, anatomic variant. RETROPERITONEUM/LYMPH NODES: Increased retroperitoneal soft tissue with encasement of the the celiac axis, proximal common hepatic and left gastric arteries.  Partially calcified left para-aortic node (3-146), 2.1 x 1 cm, stable. ABDOMINAL WALL: Postsurgical changes. Tiny fat-containing umbilical hernia. BONES: Degenerative changes. IMPRESSION: Bilateral pulmonary nodules, many of which are cavitary, increased in size compared to prior 10/31/2023. Increased retroperitoneal soft tissue with encasement of the celiac axis, proximal common hepatic and left gastric arteries. Stable hepatic lesion.  Review of labs from 2/27/2024: CEA = 57.6, T bili = 2.8, Gluc = 221. Xeloda 8 pills 2 weeks on / 1 week off; started 11/16/2023 and last dose 2/29/24 AM. Avastin q 3 weeks, last dose = 2/27/24.  3/20/2024 Patient returned from vacation. Feels well and would like to move forward with treatment. We re-reviewed the "Randomized Phase 2 Study of DKN-01 Plus FOLFIRI/FOLFOX and Bevacizumab Versus FOLFIRI/FOLFOX and Bevacizumab as Second-line Treatment of Advanced Colorectal Cancer (DeFianCe)" He and his wife agreed to move forward and a consent process was performed. I have ordered MRI head and repeat imaging, as well as repeat labs. His Total bilirubin was elevated prior visit, and is now improving. There is evidence of Gilbert's disease in terms of heterozygous [id number]  Repeat bilirubin today decreased to 2.4 (was 3.4) Will review with sponsor about the bilirubin and if this level acceptable for eligibility.  4/9/2024 C1D1 FOLFIRI + Bevacizumab 4/1/24 5-FU 2400 mg/m2; 5-FU  mg/m2; Irinotecan 180 mg/m2 + Bevacizumab 5 mg/kg Patient has had FOLFOX + Jennifer in the past. We re-reviewed adverse events. He feels that his tolerance was pretty good. He notes that when is off of dexamethasone on day 4, that fatigue is increased. Stool - loose stool up to 3 times and did not need immodium. Hiccoughs - on day 2 and 3 of treatment, and will look into use of Emend since not on DKN-01  4/16/2024 C1D1 FOLFIRI + Bevacizumab 4/1/24 5-FU 2400 mg/m2; 5-FU  mg/m2; Irinotecan 180 mg/m2 + Bevacizumab 5 mg/kg Today is C2D1. Will be delayed 1 week due to ANC < 1000. Will change treatment dosing: Stop 5FU IVP, and lower irinotecan to 150. New treatment will be 5-FU 2400 mg/m2,  mg.m2, Irinotecan 150 mg/m2 + Bevacizumab 5 mg/kg. Will give Zarxio 480 mcg daily for 3 days.  04/23 C2D1 FOLFIRI + JENNIFER today 5-FU 2400 mg/m2; Irinotecan 150 mg/m2; Jennifer 5 mg/kg. Doing relatively well today. Admits to diarrhea during one week of his treatment which has subsided. At most had 1-3 bowel movements on those days.  Did not take Imodium. He noticed some mild lower back pain 2/2 from Zarxio injection.  He did take Claritin on days of treatment + PRN advil. Last week was having pain in his gum and was found to have an infected crown. His dentist placed him on Amoxicillin which he completed. No other major adverse events from treatment.  5/7/24 C3D1 FOLFIRI + JENNIFER today 5-FU 2400 mg/m2; Irinotecan 150 mg/m2; Jennifer 5 mg/kg.. [place] ED visit: On D#5 of his cycle patient reports that he was outside gardening when he had lower back pain went into house to take a warm bath.  Patient reports that when he went to go get a warm bath felt lightheaded denies passing out.  No closed head injury.  No chest pain shortness of breath or back pain at this time.   Patient reports that he has history of low blood pressure after receiving chemo.   He was given IVF x 2 liters and sent home. His SCr was elevated 2/2 to pre-renal (dehydration). He felt better afterwards and was back to baseline. Denies diarrhea now.  Generally admits to diarrhea x one week after chemotherapy and admits to 1-3 episodes on those days. Will repeat CBC with diff during NILAM period next week at local [facility].

## 2024-05-08 DIAGNOSIS — R11.2 NAUSEA WITH VOMITING, UNSPECIFIED: ICD-10-CM

## 2024-05-08 DIAGNOSIS — Z51.11 ENCOUNTER FOR ANTINEOPLASTIC CHEMOTHERAPY: ICD-10-CM

## 2024-05-09 ENCOUNTER — APPOINTMENT (OUTPATIENT)
Dept: INFUSION THERAPY | Facility: HOSPITAL | Age: 59
End: 2024-05-09

## 2024-05-10 ENCOUNTER — APPOINTMENT (OUTPATIENT)
Dept: INFUSION THERAPY | Facility: HOSPITAL | Age: 59
End: 2024-05-10

## 2024-05-13 DIAGNOSIS — E86.0 DEHYDRATION: ICD-10-CM

## 2024-05-14 ENCOUNTER — RESULT REVIEW (OUTPATIENT)
Age: 59
End: 2024-05-14

## 2024-05-14 ENCOUNTER — APPOINTMENT (OUTPATIENT)
Dept: HEMATOLOGY ONCOLOGY | Facility: CLINIC | Age: 59
End: 2024-05-14

## 2024-05-16 LAB
BASOPHILS # BLD AUTO: 0.02 K/UL
BASOPHILS NFR BLD AUTO: 0.6 %
EOSINOPHIL # BLD AUTO: 0.08 K/UL
EOSINOPHIL NFR BLD AUTO: 2.2 %
HCT VFR BLD CALC: 38.3 %
HGB BLD-MCNC: 12.9 G/DL
IMM GRANULOCYTES NFR BLD AUTO: 0.3 %
LYMPHOCYTES # BLD AUTO: 1.4 K/UL
LYMPHOCYTES NFR BLD AUTO: 39.1 %
MAN DIFF?: NORMAL
MCHC RBC-ENTMCNC: 32.3 PG
MCHC RBC-ENTMCNC: 33.7 GM/DL
MCV RBC AUTO: 96 FL
MONOCYTES # BLD AUTO: 0.45 K/UL
MONOCYTES NFR BLD AUTO: 12.6 %
NEUTROPHILS # BLD AUTO: 1.62 K/UL
NEUTROPHILS NFR BLD AUTO: 45.2 %
PLATELET # BLD AUTO: 115 K/UL
RBC # BLD: 3.99 M/UL
RBC # FLD: 15.9 %
WBC # FLD AUTO: 3.58 K/UL

## 2024-05-18 ENCOUNTER — LABORATORY RESULT (OUTPATIENT)
Age: 59
End: 2024-05-18

## 2024-05-20 LAB
ALBUMIN SERPL ELPH-MCNC: 3.8 G/DL
ALP BLD-CCNC: 71 U/L
ALT SERPL-CCNC: 38 U/L
ANION GAP SERPL CALC-SCNC: 11 MMOL/L
AST SERPL-CCNC: 23 U/L
BASOPHILS # BLD AUTO: 0.02 K/UL
BASOPHILS NFR BLD AUTO: 0.5 %
BILIRUB SERPL-MCNC: 0.8 MG/DL
BUN SERPL-MCNC: 9 MG/DL
CALCIUM SERPL-MCNC: 9.1 MG/DL
CEA SERPL-MCNC: 33.4 NG/ML
CHLORIDE SERPL-SCNC: 101 MMOL/L
CO2 SERPL-SCNC: 26 MMOL/L
CREAT SERPL-MCNC: 0.71 MG/DL
EGFR: 107 ML/MIN/1.73M2
EOSINOPHIL # BLD AUTO: 0.09 K/UL
EOSINOPHIL NFR BLD AUTO: 2.4 %
GLUCOSE SERPL-MCNC: 129 MG/DL
HCT VFR BLD CALC: 36.8 %
HGB BLD-MCNC: 12.8 G/DL
IMM GRANULOCYTES NFR BLD AUTO: 0 %
LYMPHOCYTES # BLD AUTO: 1.26 K/UL
LYMPHOCYTES NFR BLD AUTO: 33.5 %
MAN DIFF?: NORMAL
MCHC RBC-ENTMCNC: 33.1 PG
MCHC RBC-ENTMCNC: 34.8 GM/DL
MCV RBC AUTO: 95.1 FL
MONOCYTES # BLD AUTO: 0.54 K/UL
MONOCYTES NFR BLD AUTO: 14.4 %
NEUTROPHILS # BLD AUTO: 1.85 K/UL
NEUTROPHILS NFR BLD AUTO: 49.2 %
PLATELET # BLD AUTO: 114 K/UL
POTASSIUM SERPL-SCNC: 4.7 MMOL/L
PROT SERPL-MCNC: 5.8 G/DL
RBC # BLD: 3.87 M/UL
RBC # FLD: 16.2 %
SODIUM SERPL-SCNC: 137 MMOL/L
WBC # FLD AUTO: 3.76 K/UL

## 2024-05-21 ENCOUNTER — APPOINTMENT (OUTPATIENT)
Dept: HEMATOLOGY ONCOLOGY | Facility: CLINIC | Age: 59
End: 2024-05-21

## 2024-05-21 ENCOUNTER — APPOINTMENT (OUTPATIENT)
Dept: INFUSION THERAPY | Facility: HOSPITAL | Age: 59
End: 2024-05-21
Payer: COMMERCIAL

## 2024-05-21 ENCOUNTER — NON-APPOINTMENT (OUTPATIENT)
Age: 59
End: 2024-05-21

## 2024-05-21 ENCOUNTER — APPOINTMENT (OUTPATIENT)
Age: 59
End: 2024-05-21

## 2024-05-21 PROCEDURE — 93005 ELECTROCARDIOGRAM TRACING: CPT

## 2024-05-21 PROCEDURE — 93010 ELECTROCARDIOGRAM REPORT: CPT

## 2024-05-21 RX ORDER — PROCHLORPERAZINE MALEATE 10 MG/1
10 TABLET ORAL EVERY 8 HOURS
Qty: 90 | Refills: 1 | Status: ACTIVE | COMMUNITY
Start: 2023-02-01 | End: 1900-01-01

## 2024-05-23 ENCOUNTER — APPOINTMENT (OUTPATIENT)
Dept: INFUSION THERAPY | Facility: HOSPITAL | Age: 59
End: 2024-05-23

## 2024-05-24 ENCOUNTER — APPOINTMENT (OUTPATIENT)
Dept: INFUSION THERAPY | Facility: HOSPITAL | Age: 59
End: 2024-05-24

## 2024-05-24 ENCOUNTER — NON-APPOINTMENT (OUTPATIENT)
Age: 59
End: 2024-05-24

## 2024-05-28 ENCOUNTER — APPOINTMENT (OUTPATIENT)
Dept: HEMATOLOGY ONCOLOGY | Facility: CLINIC | Age: 59
End: 2024-05-28

## 2024-05-28 ENCOUNTER — OUTPATIENT (OUTPATIENT)
Dept: OUTPATIENT SERVICES | Facility: HOSPITAL | Age: 59
LOS: 1 days | End: 2024-05-28
Payer: COMMERCIAL

## 2024-05-28 ENCOUNTER — APPOINTMENT (OUTPATIENT)
Dept: CT IMAGING | Facility: CLINIC | Age: 59
End: 2024-05-28
Payer: COMMERCIAL

## 2024-05-28 ENCOUNTER — NON-APPOINTMENT (OUTPATIENT)
Age: 59
End: 2024-05-28

## 2024-05-28 DIAGNOSIS — C18.9 MALIGNANT NEOPLASM OF COLON, UNSPECIFIED: ICD-10-CM

## 2024-05-28 DIAGNOSIS — Z98.890 OTHER SPECIFIED POSTPROCEDURAL STATES: Chronic | ICD-10-CM

## 2024-05-28 PROCEDURE — 71260 CT THORAX DX C+: CPT | Mod: 26

## 2024-05-28 PROCEDURE — 74177 CT ABD & PELVIS W/CONTRAST: CPT

## 2024-05-28 PROCEDURE — 71260 CT THORAX DX C+: CPT

## 2024-05-28 PROCEDURE — 74177 CT ABD & PELVIS W/CONTRAST: CPT | Mod: 26

## 2024-06-01 ENCOUNTER — LABORATORY RESULT (OUTPATIENT)
Age: 59
End: 2024-06-01

## 2024-06-01 ENCOUNTER — EMERGENCY (EMERGENCY)
Facility: HOSPITAL | Age: 59
LOS: 0 days | Discharge: HOME | End: 2024-06-01
Attending: STUDENT IN AN ORGANIZED HEALTH CARE EDUCATION/TRAINING PROGRAM
Payer: COMMERCIAL

## 2024-06-01 VITALS
TEMPERATURE: 99 F | RESPIRATION RATE: 18 BRPM | OXYGEN SATURATION: 100 % | SYSTOLIC BLOOD PRESSURE: 125 MMHG | HEART RATE: 95 BPM | DIASTOLIC BLOOD PRESSURE: 85 MMHG

## 2024-06-01 VITALS
WEIGHT: 191.14 LBS | TEMPERATURE: 99 F | OXYGEN SATURATION: 99 % | SYSTOLIC BLOOD PRESSURE: 129 MMHG | DIASTOLIC BLOOD PRESSURE: 87 MMHG | HEART RATE: 92 BPM | RESPIRATION RATE: 19 BRPM

## 2024-06-01 DIAGNOSIS — R79.89 OTHER SPECIFIED ABNORMAL FINDINGS OF BLOOD CHEMISTRY: ICD-10-CM

## 2024-06-01 DIAGNOSIS — Z98.890 OTHER SPECIFIED POSTPROCEDURAL STATES: Chronic | ICD-10-CM

## 2024-06-01 DIAGNOSIS — Z90.49 ACQUIRED ABSENCE OF OTHER SPECIFIED PARTS OF DIGESTIVE TRACT: Chronic | ICD-10-CM

## 2024-06-01 LAB
ALBUMIN SERPL ELPH-MCNC: 3.3 G/DL — SIGNIFICANT CHANGE UP (ref 3.3–5)
ALP SERPL-CCNC: 62 U/L — SIGNIFICANT CHANGE UP (ref 40–120)
ALT FLD-CCNC: 45 U/L — SIGNIFICANT CHANGE UP (ref 12–78)
ANION GAP SERPL CALC-SCNC: 6 MMOL/L — SIGNIFICANT CHANGE UP (ref 5–17)
APTT BLD: 24.6 SEC — SIGNIFICANT CHANGE UP (ref 24.5–35.6)
AST SERPL-CCNC: 29 U/L — SIGNIFICANT CHANGE UP (ref 15–37)
BASOPHILS # BLD AUTO: 0.02 K/UL — SIGNIFICANT CHANGE UP (ref 0–0.2)
BASOPHILS NFR BLD AUTO: 0.4 % — SIGNIFICANT CHANGE UP (ref 0–2)
BILIRUB SERPL-MCNC: 0.8 MG/DL — SIGNIFICANT CHANGE UP (ref 0.2–1.2)
BLD GP AB SCN SERPL QL: SIGNIFICANT CHANGE UP
BUN SERPL-MCNC: 17 MG/DL — SIGNIFICANT CHANGE UP (ref 7–23)
CALCIUM SERPL-MCNC: 9.4 MG/DL — SIGNIFICANT CHANGE UP (ref 8.5–10.1)
CHLORIDE SERPL-SCNC: 103 MMOL/L — SIGNIFICANT CHANGE UP (ref 96–108)
CO2 SERPL-SCNC: 27 MMOL/L — SIGNIFICANT CHANGE UP (ref 22–31)
CREAT SERPL-MCNC: 0.89 MG/DL — SIGNIFICANT CHANGE UP (ref 0.5–1.3)
EGFR: 100 ML/MIN/1.73M2 — SIGNIFICANT CHANGE UP
EOSINOPHIL # BLD AUTO: 0.11 K/UL — SIGNIFICANT CHANGE UP (ref 0–0.5)
EOSINOPHIL NFR BLD AUTO: 2.4 % — SIGNIFICANT CHANGE UP (ref 0–6)
GLUCOSE SERPL-MCNC: 145 MG/DL — HIGH (ref 70–99)
HCT VFR BLD CALC: 38.5 % — LOW (ref 39–50)
HGB BLD-MCNC: 13.4 G/DL — SIGNIFICANT CHANGE UP (ref 13–17)
IMM GRANULOCYTES NFR BLD AUTO: 0.4 % — SIGNIFICANT CHANGE UP (ref 0–0.9)
INR BLD: 0.95 RATIO — SIGNIFICANT CHANGE UP (ref 0.85–1.18)
LYMPHOCYTES # BLD AUTO: 1.24 K/UL — SIGNIFICANT CHANGE UP (ref 1–3.3)
LYMPHOCYTES # BLD AUTO: 26.5 % — SIGNIFICANT CHANGE UP (ref 13–44)
MCHC RBC-ENTMCNC: 32.8 PG — SIGNIFICANT CHANGE UP (ref 27–34)
MCHC RBC-ENTMCNC: 34.8 GM/DL — SIGNIFICANT CHANGE UP (ref 32–36)
MCV RBC AUTO: 94.1 FL — SIGNIFICANT CHANGE UP (ref 80–100)
MONOCYTES # BLD AUTO: 0.63 K/UL — SIGNIFICANT CHANGE UP (ref 0–0.9)
MONOCYTES NFR BLD AUTO: 13.5 % — SIGNIFICANT CHANGE UP (ref 2–14)
NEUTROPHILS # BLD AUTO: 2.66 K/UL — SIGNIFICANT CHANGE UP (ref 1.8–7.4)
NEUTROPHILS NFR BLD AUTO: 56.8 % — SIGNIFICANT CHANGE UP (ref 43–77)
PLATELET # BLD AUTO: 122 K/UL — LOW (ref 150–400)
POTASSIUM SERPL-MCNC: 4 MMOL/L — SIGNIFICANT CHANGE UP (ref 3.5–5.3)
POTASSIUM SERPL-SCNC: 4 MMOL/L — SIGNIFICANT CHANGE UP (ref 3.5–5.3)
PROT SERPL-MCNC: 6.9 GM/DL — SIGNIFICANT CHANGE UP (ref 6–8.3)
PROTHROM AB SERPL-ACNC: 10.7 SEC — SIGNIFICANT CHANGE UP (ref 9.5–13)
RBC # BLD: 4.09 M/UL — LOW (ref 4.2–5.8)
RBC # FLD: 15.3 % — HIGH (ref 10.3–14.5)
SODIUM SERPL-SCNC: 136 MMOL/L — SIGNIFICANT CHANGE UP (ref 135–145)
WBC # BLD: 4.68 K/UL — SIGNIFICANT CHANGE UP (ref 3.8–10.5)
WBC # FLD AUTO: 4.68 K/UL — SIGNIFICANT CHANGE UP (ref 3.8–10.5)

## 2024-06-01 PROCEDURE — 86901 BLOOD TYPING SEROLOGIC RH(D): CPT

## 2024-06-01 PROCEDURE — 99283 EMERGENCY DEPT VISIT LOW MDM: CPT | Mod: 25

## 2024-06-01 PROCEDURE — 99284 EMERGENCY DEPT VISIT MOD MDM: CPT

## 2024-06-01 PROCEDURE — 86900 BLOOD TYPING SEROLOGIC ABO: CPT

## 2024-06-01 PROCEDURE — 85730 THROMBOPLASTIN TIME PARTIAL: CPT

## 2024-06-01 PROCEDURE — 80053 COMPREHEN METABOLIC PANEL: CPT

## 2024-06-01 PROCEDURE — 85610 PROTHROMBIN TIME: CPT

## 2024-06-01 PROCEDURE — 36000 PLACE NEEDLE IN VEIN: CPT

## 2024-06-01 PROCEDURE — 36415 COLL VENOUS BLD VENIPUNCTURE: CPT

## 2024-06-01 PROCEDURE — 85025 COMPLETE CBC W/AUTO DIFF WBC: CPT

## 2024-06-01 PROCEDURE — 86850 RBC ANTIBODY SCREEN: CPT

## 2024-06-01 NOTE — ED PROVIDER NOTE - CLINICAL SUMMARY MEDICAL DECISION MAKING FREE TEXT BOX
60 y/o male with PMHx of Colon cancer presents to the ED after having outpatient INR>15 and pt ptt >200. Pt denies easy bleeding, no complaints, pt not on anticoagulation. Will do labs r/o coagulopathies. Plan for labs and re-evaluation

## 2024-06-01 NOTE — ED ADULT NURSE NOTE - OBJECTIVE STATEMENT
58yo Male SIB MD for abnormal lab result. As per pt, pt was doing a pre-chemo blood work screening for a clinical trial today when they were advised by MD to come to ED for INR>15. Pt states he feels asymptomatic, denies uncontrollable bleeding, use of blood thinners, tasting of blood in the back of mouth, CP, dizziness, SOB, petechiae. Pt notes he got a cut earlier today that coagulated quickly and puncture wound from earlier blood work today coagulated quickly as well. Pt noted to have port to Right chest and asks if anticoagulation medication used during port insertion can be reason for elevated INR. AOx4, ambulatory, no signs or symptoms of distress at this time.

## 2024-06-01 NOTE — ED PROVIDER NOTE - PHYSICAL EXAMINATION
Constitutional: NAD AAOx3  Eyes: PERRLA EOMI  Head: Normocephalic atraumatic  Mouth: MMM  Cardiac: regular rate   Resp: unlabored breathing  GI: Abd s/nt/nd  Neuro: grossly normal and intact  Skin: No visible rashes GERD (gastroesophageal reflux disease)    Mild intermittent asthma, unspecified whether complicated

## 2024-06-01 NOTE — ED PROVIDER NOTE - OBJECTIVE STATEMENT
60 y/o male with PMHx of Colon cancer presents to the ED after having outpatient INR>15 and pt ptt >200. Pt denies easy bleeding, no complaints, pt not on anticoagulation. Pt had labs done for upcoming chemo. Pt denies abdominal pain, N/V/D, chest pain, SOB.

## 2024-06-01 NOTE — ED PROVIDER NOTE - NS_ ATTENDINGSCRIBEDETAILS _ED_A_ED_FT
I, Adalid Delgado DO,  performed the initial face to face bedside interview with this patient regarding history of present illness, review of symptoms and relevant past medical, social and family history.  I completed an independent physical examination.  I was the initial provider who evaluated this patient.   I personally saw the patient and performed a substantive portion of the visit including all aspects of the medical decision making.  The history, relevant review of systems, past medical and surgical history, medical decision making, and physical examination was documented by the scribe in my presence and I attest to the accuracy of the documentation.

## 2024-06-01 NOTE — ED PROVIDER NOTE - PATIENT PORTAL LINK FT
You can access the FollowMyHealth Patient Portal offered by [facility] by registering at the following website: [email or web address]. By joining [facility]’s FollowMyHealth portal, you will also be able to view your health information using other applications (apps) compatible with our system.

## 2024-06-01 NOTE — ED PROVIDER NOTE - NSFOLLOWUPINSTRUCTIONS_ED_ALL_ED_FT
your repeat PT, PTT and INR in the ED were within normal limits and similar to prior results. The elevated results done today were likely due to a lab error. Please follow-up with your hematologist/oncologist. your repeat PT, PTT and INR in the ED were within normal limits and similar to prior results. The elevated results done today were likely due to a lab error. Please follow-up with your hematologist/oncologist.    Patient advised to take follow up with PMD/ for follow up in 3-4 days.  patient and his wife understands and agrees with plan.  Return to ER if symptoms worsens or develop new symptoms.

## 2024-06-01 NOTE — ED PROVIDER NOTE - PROGRESS NOTE DETAILS
Diego Cabrera for ED attending, Dr. Delgado: repeat labs with pt/ptt, INR WNL, no prior hx of coagulopathies, possible lab error outpatient. results were discussed with pt and pt wife, no other acute interventions needed in ED. will d/c with outpatient PCP/hemeonc f/u. Diego Cabrera for ED attending, Dr. Delgado: repeat labs with pt/ptt, INR WNL, no prior hx of coagulopathies, possible lab error outpatient . results were discussed with pt and pt wife, no other acute interventions needed in ED. will d/c with outpatient PCP/hemeonc f/u.

## 2024-06-01 NOTE — ED ADULT NURSE NOTE - NSFALLUNIVINTERV_ED_ALL_ED
Bed/Stretcher in lowest position, wheels locked, appropriate side rails in place/Call bell, personal items and telephone in reach/Instruct patient to call for assistance before getting out of bed/chair/stretcher/Non-slip footwear applied when patient is off stretcher/[place] to call system/Physically safe environment - no spills, clutter or unnecessary equipment/Purposeful proactive rounding/Room/bathroom lighting operational, light cord in reach

## 2024-06-04 ENCOUNTER — APPOINTMENT (OUTPATIENT)
Dept: INFUSION THERAPY | Facility: HOSPITAL | Age: 59
End: 2024-06-04

## 2024-06-04 ENCOUNTER — NON-APPOINTMENT (OUTPATIENT)
Age: 59
End: 2024-06-04

## 2024-06-04 ENCOUNTER — RESULT REVIEW (OUTPATIENT)
Age: 59
End: 2024-06-04

## 2024-06-04 ENCOUNTER — APPOINTMENT (OUTPATIENT)
Dept: HEMATOLOGY ONCOLOGY | Facility: CLINIC | Age: 59
End: 2024-06-04
Payer: COMMERCIAL

## 2024-06-04 LAB
ALBUMIN SERPL ELPH-MCNC: 3.8 G/DL
ALBUMIN SERPL ELPH-MCNC: 4 G/DL — SIGNIFICANT CHANGE UP (ref 3.3–5)
ALP BLD-CCNC: 63 U/L
ALP SERPL-CCNC: 59 U/L — SIGNIFICANT CHANGE UP (ref 40–120)
ALT FLD-CCNC: 26 U/L — SIGNIFICANT CHANGE UP (ref 10–45)
ALT SERPL-CCNC: 31 U/L
ANION GAP SERPL CALC-SCNC: 13 MMOL/L
ANION GAP SERPL CALC-SCNC: 9 MMOL/L — SIGNIFICANT CHANGE UP (ref 5–17)
AST SERPL-CCNC: 25 U/L
AST SERPL-CCNC: 28 U/L — SIGNIFICANT CHANGE UP (ref 10–40)
BASOPHILS # BLD AUTO: 0.02 K/UL
BASOPHILS NFR BLD AUTO: 0.5 %
BILIRUB SERPL-MCNC: 0.8 MG/DL
BILIRUB SERPL-MCNC: 0.9 MG/DL — SIGNIFICANT CHANGE UP (ref 0.2–1.2)
BUN SERPL-MCNC: 10 MG/DL
BUN SERPL-MCNC: 8 MG/DL — SIGNIFICANT CHANGE UP (ref 7–23)
CALCIUM SERPL-MCNC: 9.4 MG/DL
CALCIUM SERPL-MCNC: 9.4 MG/DL — SIGNIFICANT CHANGE UP (ref 8.4–10.5)
CEA SERPL-MCNC: 41.4 NG/ML — HIGH (ref 0–3.8)
CHLORIDE SERPL-SCNC: 100 MMOL/L — SIGNIFICANT CHANGE UP (ref 96–108)
CHLORIDE SERPL-SCNC: 102 MMOL/L
CO2 SERPL-SCNC: 25 MMOL/L
CO2 SERPL-SCNC: 26 MMOL/L — SIGNIFICANT CHANGE UP (ref 22–31)
CREAT SERPL-MCNC: 0.63 MG/DL — SIGNIFICANT CHANGE UP (ref 0.5–1.3)
CREAT SERPL-MCNC: 0.71 MG/DL
EGFR: 107 ML/MIN/1.73M2
EGFR: 111 ML/MIN/1.73M2 — SIGNIFICANT CHANGE UP
EOSINOPHIL # BLD AUTO: 0.14 K/UL
EOSINOPHIL NFR BLD AUTO: 3.4 %
GLUCOSE SERPL-MCNC: 162 MG/DL
GLUCOSE SERPL-MCNC: 220 MG/DL — HIGH (ref 70–99)
HCT VFR BLD CALC: 39.4 %
HGB BLD-MCNC: 13.4 G/DL
IMM GRANULOCYTES NFR BLD AUTO: 0.2 %
LYMPHOCYTES # BLD AUTO: 1.47 K/UL
LYMPHOCYTES NFR BLD AUTO: 36 %
MAGNESIUM SERPL-MCNC: 2 MG/DL — SIGNIFICANT CHANGE UP (ref 1.6–2.6)
MAN DIFF?: NORMAL
MCHC RBC-ENTMCNC: 32.4 PG
MCHC RBC-ENTMCNC: 34 GM/DL
MCV RBC AUTO: 95.4 FL
MONOCYTES # BLD AUTO: 0.59 K/UL
MONOCYTES NFR BLD AUTO: 14.5 %
NEUTROPHILS # BLD AUTO: 1.85 K/UL
NEUTROPHILS NFR BLD AUTO: 45.4 %
PLATELET # BLD AUTO: 110 K/UL
POTASSIUM SERPL-MCNC: 4.1 MMOL/L — SIGNIFICANT CHANGE UP (ref 3.5–5.3)
POTASSIUM SERPL-SCNC: 4.1 MMOL/L — SIGNIFICANT CHANGE UP (ref 3.5–5.3)
POTASSIUM SERPL-SCNC: 4.7 MMOL/L
PROT SERPL-MCNC: 6.1 G/DL
PROT SERPL-MCNC: 6.1 G/DL — SIGNIFICANT CHANGE UP (ref 6–8.3)
RBC # BLD: 4.13 M/UL
RBC # FLD: 15.7 %
SODIUM SERPL-SCNC: 136 MMOL/L — SIGNIFICANT CHANGE UP (ref 135–145)
SODIUM SERPL-SCNC: 139 MMOL/L
WBC # FLD AUTO: 4.08 K/UL

## 2024-06-04 PROCEDURE — 99214 OFFICE O/P EST MOD 30 MIN: CPT

## 2024-06-04 PROCEDURE — 93005 ELECTROCARDIOGRAM TRACING: CPT

## 2024-06-04 PROCEDURE — G2211 COMPLEX E/M VISIT ADD ON: CPT | Mod: NC,1L

## 2024-06-04 PROCEDURE — 93010 ELECTROCARDIOGRAM REPORT: CPT

## 2024-06-04 RX ORDER — LORAZEPAM 1 MG/1
1 TABLET ORAL
Qty: 30 | Refills: 0 | Status: ACTIVE | COMMUNITY
Start: 2022-09-20 | End: 1900-01-01

## 2024-06-04 NOTE — HISTORY OF PRESENT ILLNESS
[de-identified] : Mr. Mccarthy was found to have mild iron deficiency anemia of routine blood work at the end of 2020. Colonoscopy revealed a large tumor in the proximal transverse colon. He had no GI symptoms. A CT of the chest, abdomen and pelvis in January, 2021 revealed no evidence of metastatic disease.  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 There was lymphovascular invasion, with 0 of 14 lymph nodes involved with cancer ([id number], high-risk stage II). Proficient mismatch repair.  He received adjuvant capecitabine x 6 months 3/4/21-end of August 2021 with Dr. Lula Sumner, with minimal toxicity. His CEA was persistently elevated near 7, with multiple negative scans.  ctDNA testing in January, 2022 was unrevealing.  In August, 2022 CT scans showed several enlarging paraaortic lymph nodes (reference node 2.3 x 1.1cm, previously 1.4 x 0.5cm) and two (bilateral) subcentimeter lung nodules. LYMPH NODE BIOPSY AND CYTOLOGIC PREPARATION (ABDOMINAL AORTOCAVAL LYMPH NODE): - METASTATIC ADENOCARCINOMA WITH NECROSIS; MORPHOLOGICALLY CONSISTENT WITH PATIENT'S PREVIOUSLY DIAGNOSED COLONIC ADENOCARCINOMA.  Mr. Mccarthy has been back to Dr. Sumner, was offered FOLFOX + bevacizumab. He has also seen doctors from [facility] [place], who offered him watchful waiting versus irinotecan. His friend, Norbert Borjas is my patient, and suggested he see me as third opinion. Found to have stable aortocaval lymph nodes previously noted on the PET scan on CT imaging at the end of October, 2022.  An IR-guided biopsy of the aortocaval node shortly thereafter confirmed metastatic adenocarcinoma morphologically equivalent to his known colon primary. CT scan completed January, 2023 showed increase in size and number of lung metastases. New liver lesions suspicious for metastatic disease. Increase in size of a periceliac lymph node. Other retroperitoneal lymph nodes are stable.  Proceeded to XELOX + bevacizumab.  2/20/24 CAT Scan CAP COMPARISON: CT chest/abdomen/pelvis 10/31/2023 and 7/24/2023. LUNGS AND LARGE AIRWAYS: Patent central airways. Bilateral pulmonary nodules, many of which are cavitary, increased in size compared to prior. Reference: Right upper lobe nodule (3-49), 1.6 x 1.4 cm, previously 1.3 x 1.2 cm. Right upper lobe nodule (3-43), 1.5 cm, previously 1.1 cm, with increased cavitation. Left lower lobe nodule (3-24), 1.1 cm, previously 0.9 cm. PLEURA: No pleural effusion. VESSELS: Atherosclerotic changes of the coronary arteries. HEART: Heart size is normal. No pericardial effusion. MEDIASTINUM AND SONIA: Prominent right hilar lymph node (2-65), 1.1 x 1.1 cm, previously 1.4 x 1.1 cm. CHEST WALL AND LOWER NECK: Right anterior chest wall infusion port with tip in the SVC. LIVER: Caudate lobe hypoattenuating lesion (3-135), 1.5 x 1.2 cm, previously 1.6 x 1.1 cm. BILE DUCTS: Normal caliber. GALLBLADDER: Cholecystectomy. SPLEEN: Within normal limits. PANCREAS: Within normal limits. ADRENALS: Within normal limits. KIDNEYS/URETERS: Bilateral symmetric enhancement. No hydronephrosis. Punctate nonobstructing left renal interpole calculus. BLADDER: Minimally distended. REPRODUCTIVE ORGANS: Prostate within normal limits. BOWEL: Small hiatal hernia. Right hemicolectomy. No bowel obstruction. PERITONEUM: No ascites. Cystic lesion adjacent to the 4th portion of the duodenum (3-181), 1.9 x 1.5 cm, may represent lymphangioma or duplication cyst. VESSELS: Atherosclerotic changes. Left hepatic artery arising from the left gastric artery, anatomic variant. RETROPERITONEUM/LYMPH NODES: Increased retroperitoneal soft tissue with encasement of the the celiac axis, proximal common hepatic and left gastric arteries.  Partially calcified left para-aortic node (3-146), 2.1 x 1 cm, stable. ABDOMINAL WALL: Postsurgical changes. Tiny fat-containing umbilical hernia. BONES: Degenerative changes. IMPRESSION: Bilateral pulmonary nodules, many of which are cavitary, increased in size compared to prior 10/31/2023. Increased retroperitoneal soft tissue with encasement of the celiac axis, proximal common hepatic and left gastric arteries. Stable hepatic lesion.  Review of labs from 2/27/2024: CEA = 57.6, T bili = 2.8, Gluc = 221. Xeloda 8 pills 2 weeks on / 1 week off; started 11/16/2023 and last dose 2/29/24 AM. Avastin q 3 weeks, last dose = 2/27/24.  3/20/2024 Patient returned from vacation. Feels well and would like to move forward with treatment. We re-reviewed the "Randomized Phase 2 Study of DKN-01 Plus FOLFIRI/FOLFOX and Bevacizumab Versus FOLFIRI/FOLFOX and Bevacizumab as Second-line Treatment of Advanced Colorectal Cancer (DeFianCe)" He and his wife agreed to move forward and a consent process was performed. I have ordered MRI head and repeat imaging, as well as repeat labs. His Total bilirubin was elevated prior visit, and is now improving. There is evidence of Gilbert's disease in terms of heterozygous UGT1A1  Repeat bilirubin today decreased to 2.4 (was 3.4) Will review with sponsor about the bilirubin and if this level acceptable for eligibility.  4/9/2024 C1D1 FOLFIRI + Bevacizumab 4/1/24 5-FU 2400 mg/m2; 5-FU  mg/m2; Irinotecan 180 mg/m2 + Bevacizumab 5 mg/kg Patient has had FOLFOX + Jennifer in the past. We re-reviewed adverse events. He feels that his tolerance was pretty good. He notes that when is off of dexamethasone on day 4, that fatigue is increased. Stool - loose stool up to 3 times and did not need immodium. Hiccoughs - on day 2 and 3 of treatment, and will look into use of Emend since not on DKN-01  4/16/2024 C1D1 FOLFIRI + Bevacizumab 4/1/24 5-FU 2400 mg/m2; 5-FU  mg/m2; Irinotecan 180 mg/m2 + Bevacizumab 5 mg/kg Today is C2D1. Will be delayed 1 week due to ANC < 1000. Will change treatment dosing: Stop 5FU IVP, and lower irinotecan to 150. New treatment will be 5-FU 2400 mg/m2,  mg.m2, Irinotecan 150 mg/m2 + Bevacizumab 5 mg/kg. Will give Zarxio 480 mcg daily for 3 days.  04/23 C2D1 FOLFIRI + JENNIFER today 5-FU 2400 mg/m2; Irinotecan 150 mg/m2; Jennifer 5 mg/kg. Doing relatively well today. Admits to diarrhea during one week of his treatment which has subsided. At most had 1-3 bowel movements on those days.  Did not take Imodium. He noticed some mild lower back pain 2/2 from Zarxio injection.  He did take Claritin on days of treatment + PRN advil. Last week was having pain in his gum and was found to have an infected crown. His dentist placed him on Amoxicillin which he completed. No other major adverse events from treatment.  5/7/24 C3D1 FOLFIRI + JENNIFER today 5-FU 2400 mg/m2; Irinotecan 150 mg/m2; Jennifer 5 mg/kg.. [facility] ED visit: On D#5 of his cycle patient reports that he was outside gardening when he had lower back pain went into house to take a warm bath.  Patient reports that when he went to go get a warm bath felt lightheaded denies passing out.  No closed head injury.  No chest pain shortness of breath or back pain at this time.   Patient reports that he has history of low blood pressure after receiving chemo.   He was given IVF x 2 liters and sent home. His SCr was elevated 2/2 to pre-renal (dehydration). He felt better afterwards and was back to baseline. Denies diarrhea now.  Generally admits to diarrhea x one week after chemotherapy and admits to 1-3 episodes on those days. Will repeat CBC with diff during NILAM period next week at local [facility].  6/4/24 C5D1 FOLFIRI + JENNIFER today 5-FU 2400 mg/m2; Irinotecan 150 mg/m2; Jennifer 5 mg/kg CT after C#4 on 5/28 revealed response from therapy. Bilateral pulmonary metastases, mildly decreased in size. Soft tissue along the left side of the celiac axis does not appear significantly changed. Extension inferiorly versus adjacent para-aortic lymph node with calcification has minimally decreased. Right hemicolectomy with postoperative changes. He is doing relatively well on chemotherapy. No major adverse reaction. Denies diarrhea. Fatigue noted on days 4-8 of his cycle.  Abates afterwards. Continues to work.  No issues. Appetite is better.

## 2024-06-04 NOTE — ASSESSMENT
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 There was lymphovascular invasion, with 0 of 14 lymph nodes involved with cancer ([id number], high-risk stage II). Proficient mismatch repair.   He received adjuvant capecitabine x 6 months 3/4/21-end of August 2021.  Genetic testing MyRisk = negative.  CAT scans 8/2023 showed portal nodes, and IR biospy on 8/22/22 is positive for relapsed metastatic adenocarcinoma. F1CDx pan-LEONARD wild type, TMB = 4, TPS = 0%.  XELOX + bevacizumab started 2/7/2023. oxaliplatin 130 mg/m2 + bevacizumab 10 mg/kg + capecitabine q3 week cycles. He has 8 cycles, the last of which was 7/5/2023. He was then on maintenance therapy with Avastin (last dose 2/27/24) + Xeloda (last 2/29/24 AM)  We re-reviewed the "Randomized Phase 2 Study of DKN-01 Plus FOLFIRI/FOLFOX and Bevacizumab Versus FOLFIRI/FOLFOX and Bevacizumab as Second-line Treatment of Advanced Colorectal Cancer (DeFianCe)" He and his wife agreed to move forward and a consent process was performed. I have ordered MRI head and repeat imaging, as well as repeat labs.  FOLFIRI + Bevacizumab: C1D1 FOLFIRI + Bevacizumab 4/1/24 5-FU 2400 mg/m2; 5-FU  mg/m2; Irinotecan 180 mg/m2 + Bevacizumab 5 mg/kg C2D1 04/23/2024 C3D1 5/7/24 C4D1 5/21/24 C5D1 6/4/24  C5D1 FOLFIRI + KORIN today 5-FU 2400 mg/m2; Irinotecan 150 mg/m2; Korin 5 mg/kg..  CT after C#4 on 5/28 revealed response from therapy. Bilateral pulmonary metastases, mildly decreased in size. Soft tissue along the left side of the celiac axis does not appear significantly changed. Extension inferiorly versus adjacent para-aortic lymph node with calcification has minimally decreased. Right hemicolectomy with postoperative changes.  He is doing relatively well on chemotherapy. No major adverse reaction. Denies diarrhea. Fatigue noted on days 4-8 of his cycle.  Abates afterwards. Continues to work.  No issues. Appetite is better.  Labs reviewed.  f/u q cycle.  Dr. Chavez agrees with above plan.

## 2024-06-04 NOTE — PHYSICAL EXAM
[Fully active, able to carry on all pre-disease performance without restriction] : Status 0 - Fully active, able to carry on all pre-disease performance without restriction [Normal] : affect appropriate [de-identified] : anicteric [de-identified] : right chest wall mediport c/d/i [de-identified] : 3x3 non blanching macule on left elbow, PPE grade 2 on hands

## 2024-06-05 ENCOUNTER — NON-APPOINTMENT (OUTPATIENT)
Age: 59
End: 2024-06-05

## 2024-06-06 ENCOUNTER — APPOINTMENT (OUTPATIENT)
Dept: INFUSION THERAPY | Facility: HOSPITAL | Age: 59
End: 2024-06-06

## 2024-06-11 ENCOUNTER — APPOINTMENT (OUTPATIENT)
Dept: HEMATOLOGY ONCOLOGY | Facility: CLINIC | Age: 59
End: 2024-06-11

## 2024-06-15 ENCOUNTER — LABORATORY RESULT (OUTPATIENT)
Age: 59
End: 2024-06-15

## 2024-06-17 ENCOUNTER — NON-APPOINTMENT (OUTPATIENT)
Age: 59
End: 2024-06-17

## 2024-06-17 LAB
ALBUMIN SERPL ELPH-MCNC: 3.9 G/DL
ALP BLD-CCNC: 58 U/L
ALT SERPL-CCNC: 41 U/L
ANION GAP SERPL CALC-SCNC: 11 MMOL/L
AST SERPL-CCNC: 28 U/L
BASOPHILS # BLD AUTO: 0.02 K/UL
BASOPHILS NFR BLD AUTO: 0.5 %
BILIRUB SERPL-MCNC: 0.9 MG/DL
BUN SERPL-MCNC: 13 MG/DL
CALCIUM SERPL-MCNC: 9.3 MG/DL
CHLORIDE SERPL-SCNC: 100 MMOL/L
CO2 SERPL-SCNC: 24 MMOL/L
CREAT SERPL-MCNC: 0.71 MG/DL
EGFR: 107 ML/MIN/1.73M2
EOSINOPHIL # BLD AUTO: 0.08 K/UL
EOSINOPHIL NFR BLD AUTO: 1.9 %
GLUCOSE SERPL-MCNC: 156 MG/DL
HCT VFR BLD CALC: 39.8 %
HGB BLD-MCNC: 13.4 G/DL
IMM GRANULOCYTES NFR BLD AUTO: 0 %
LYMPHOCYTES # BLD AUTO: 1.3 K/UL
LYMPHOCYTES NFR BLD AUTO: 31.6 %
MAN DIFF?: NORMAL
MCHC RBC-ENTMCNC: 32.1 PG
MCHC RBC-ENTMCNC: 33.7 GM/DL
MCV RBC AUTO: 95.2 FL
MONOCYTES # BLD AUTO: 0.58 K/UL
MONOCYTES NFR BLD AUTO: 14.1 %
NEUTROPHILS # BLD AUTO: 2.14 K/UL
NEUTROPHILS NFR BLD AUTO: 51.9 %
PLATELET # BLD AUTO: 125 K/UL
POTASSIUM SERPL-SCNC: 4.8 MMOL/L
PROT SERPL-MCNC: 6.2 G/DL
RBC # BLD: 4.18 M/UL
RBC # FLD: 15.5 %
SODIUM SERPL-SCNC: 135 MMOL/L
WBC # FLD AUTO: 4.12 K/UL

## 2024-06-18 ENCOUNTER — NON-APPOINTMENT (OUTPATIENT)
Age: 59
End: 2024-06-18

## 2024-06-18 ENCOUNTER — APPOINTMENT (OUTPATIENT)
Dept: HEMATOLOGY ONCOLOGY | Facility: CLINIC | Age: 59
End: 2024-06-18
Payer: COMMERCIAL

## 2024-06-18 ENCOUNTER — APPOINTMENT (OUTPATIENT)
Dept: INFUSION THERAPY | Facility: HOSPITAL | Age: 59
End: 2024-06-18

## 2024-06-18 DIAGNOSIS — C18.4 MALIGNANT NEOPLASM OF TRANSVERSE COLON: ICD-10-CM

## 2024-06-18 DIAGNOSIS — C18.9 MALIGNANT NEOPLASM OF COLON, UNSPECIFIED: ICD-10-CM

## 2024-06-18 DIAGNOSIS — C78.7 MALIGNANT NEOPLASM OF COLON, UNSPECIFIED: ICD-10-CM

## 2024-06-18 PROCEDURE — G2211 COMPLEX E/M VISIT ADD ON: CPT | Mod: NC,1L

## 2024-06-18 PROCEDURE — 93010 ELECTROCARDIOGRAM REPORT: CPT

## 2024-06-18 PROCEDURE — 93005 ELECTROCARDIOGRAM TRACING: CPT

## 2024-06-18 PROCEDURE — 99215 OFFICE O/P EST HI 40 MIN: CPT

## 2024-06-18 RX ORDER — ONDANSETRON 8 MG/1
8 TABLET, ORALLY DISINTEGRATING ORAL
Qty: 30 | Refills: 5 | Status: ACTIVE | COMMUNITY
Start: 2024-06-18 | End: 1900-01-01

## 2024-06-18 RX ORDER — ONDANSETRON 8 MG/1
8 TABLET ORAL 3 TIMES DAILY
Qty: 30 | Refills: 3 | Status: DISCONTINUED | COMMUNITY
Start: 2023-02-01 | End: 2024-06-18

## 2024-06-18 NOTE — ASSESSMENT
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 There was lymphovascular invasion, with 0 of 14 lymph nodes involved with cancer ([id number], high-risk stage II). Proficient mismatch repair.   He received adjuvant capecitabine x 6 months 3/4/21-end of August 2021.  Genetic testing MyRisk = negative.  CAT scans 8/2023 showed portal nodes, and IR biospy on 8/22/22 is positive for relapsed metastatic adenocarcinoma. F1CDx pan-LEONARD wild type, TMB = 4, TPS = 0%.  XELOX + bevacizumab started 2/7/2023. oxaliplatin 130 mg/m2 + bevacizumab 10 mg/kg + capecitabine q3 week cycles. He has 8 cycles, the last of which was 7/5/2023. He was then on maintenance therapy with Avastin (last dose 2/27/24) + Xeloda (last 2/29/24 AM)  We re-reviewed the "Randomized Phase 2 Study of DKN-01 Plus FOLFIRI/FOLFOX and Bevacizumab Versus FOLFIRI/FOLFOX and Bevacizumab as Second-line Treatment of Advanced Colorectal Cancer (DeFianCe)" He and his wife agreed to move forward and a consent process was performed. I have ordered MRI head and repeat imaging, as well as repeat labs.  FOLFIRI + Bevacizumab: C1D1 FOLFIRI + Bevacizumab 4/1/24 5-FU 2400 mg/m2; 5-FU  mg/m2; Irinotecan 180 mg/m2 + Bevacizumab 5 mg/kg C2D1 04/23/2024 C3D1 5/7/24 C4D1 5/21/24 C5D1 6/4/24 C7D1 FOLFIRI + KORIN 6/18/24 5-FU 2400 mg/m2; Irinotecan 150 mg/m2; Korin 5 mg/kg..  CT after C#4 on 5/28 revealed response from therapy. Bilateral pulmonary metastases, mildly decreased in size. Soft tissue along the left side of the celiac axis does not appear significantly changed. Extension inferiorly versus adjacent para-aortic lymph node with calcification has minimally decreased. Right hemicolectomy with postoperative changes.  He is doing relatively well on chemotherapy. No major adverse reaction. Denies diarrhea.  CEA is mildly increased  I cannot correlate with the CT scan from 5/28. Will use signatera to help guide response. Can consider future EGFRi with hyperselection criteria given the right sided (transverse) origin of the colon cancer. Patient is wild type for NRAS KRAS and BRAF.  Will stop Aloxi and start Zofran for constipation. For Hiccoughs can lower dexamethasone.  Will patient be able to be disconnected from chemotherapy in [place].

## 2024-06-18 NOTE — HISTORY OF PRESENT ILLNESS
[de-identified] : Mr. Mccarthy was found to have mild iron deficiency anemia of routine blood work at the end of 2020. Colonoscopy revealed a large tumor in the proximal transverse colon. He had no GI symptoms. A CT of the chest, abdomen and pelvis in January, 2021 revealed no evidence of metastatic disease.  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 There was lymphovascular invasion, with 0 of 14 lymph nodes involved with cancer ([id number], high-risk stage II). Proficient mismatch repair.  He received adjuvant capecitabine x 6 months 3/4/21-end of August 2021 with Dr. Lula Sumner, with minimal toxicity. His CEA was persistently elevated near 7, with multiple negative scans.  ctDNA testing in January, 2022 was unrevealing.  In August, 2022 CT scans showed several enlarging paraaortic lymph nodes (reference node 2.3 x 1.1cm, previously 1.4 x 0.5cm) and two (bilateral) subcentimeter lung nodules. LYMPH NODE BIOPSY AND CYTOLOGIC PREPARATION (ABDOMINAL AORTOCAVAL LYMPH NODE): - METASTATIC ADENOCARCINOMA WITH NECROSIS; MORPHOLOGICALLY CONSISTENT WITH PATIENT'S PREVIOUSLY DIAGNOSED COLONIC ADENOCARCINOMA.  Mr. Mccarthy has been back to Dr. Sumner, was offered FOLFOX + bevacizumab. He has also seen doctors from [facility] [place], who offered him watchful waiting versus irinotecan. His friend, Norbert Borjas is my patient, and suggested he see me as third opinion. Found to have stable aortocaval lymph nodes previously noted on the PET scan on CT imaging at the end of October, 2022.  An IR-guided biopsy of the aortocaval node shortly thereafter confirmed metastatic adenocarcinoma morphologically equivalent to his known colon primary. CT scan completed January, 2023 showed increase in size and number of lung metastases. New liver lesions suspicious for metastatic disease. Increase in size of a periceliac lymph node. Other retroperitoneal lymph nodes are stable.  Proceeded to XELOX + bevacizumab.  2/20/24 CAT Scan CAP COMPARISON: CT chest/abdomen/pelvis 10/31/2023 and 7/24/2023. LUNGS AND LARGE AIRWAYS: Patent central airways. Bilateral pulmonary nodules, many of which are cavitary, increased in size compared to prior. Reference: Right upper lobe nodule (3-49), 1.6 x 1.4 cm, previously 1.3 x 1.2 cm. Right upper lobe nodule (3-43), 1.5 cm, previously 1.1 cm, with increased cavitation. Left lower lobe nodule (3-24), 1.1 cm, previously 0.9 cm. PLEURA: No pleural effusion. VESSELS: Atherosclerotic changes of the coronary arteries. HEART: Heart size is normal. No pericardial effusion. MEDIASTINUM AND SONIA: Prominent right hilar lymph node (2-65), 1.1 x 1.1 cm, previously 1.4 x 1.1 cm. CHEST WALL AND LOWER NECK: Right anterior chest wall infusion port with tip in the SVC. LIVER: Caudate lobe hypoattenuating lesion (3-135), 1.5 x 1.2 cm, previously 1.6 x 1.1 cm. BILE DUCTS: Normal caliber. GALLBLADDER: Cholecystectomy. SPLEEN: Within normal limits. PANCREAS: Within normal limits. ADRENALS: Within normal limits. KIDNEYS/URETERS: Bilateral symmetric enhancement. No hydronephrosis. Punctate nonobstructing left renal interpole calculus. BLADDER: Minimally distended. REPRODUCTIVE ORGANS: Prostate within normal limits. BOWEL: Small hiatal hernia. Right hemicolectomy. No bowel obstruction. PERITONEUM: No ascites. Cystic lesion adjacent to the 4th portion of the duodenum (3-181), 1.9 x 1.5 cm, may represent lymphangioma or duplication cyst. VESSELS: Atherosclerotic changes. Left hepatic artery arising from the left gastric artery, anatomic variant. RETROPERITONEUM/LYMPH NODES: Increased retroperitoneal soft tissue with encasement of the the celiac axis, proximal common hepatic and left gastric arteries.  Partially calcified left para-aortic node (3-146), 2.1 x 1 cm, stable. ABDOMINAL WALL: Postsurgical changes. Tiny fat-containing umbilical hernia. BONES: Degenerative changes. IMPRESSION: Bilateral pulmonary nodules, many of which are cavitary, increased in size compared to prior 10/31/2023. Increased retroperitoneal soft tissue with encasement of the celiac axis, proximal common hepatic and left gastric arteries. Stable hepatic lesion.  Review of labs from 2/27/2024: CEA = 57.6, T bili = 2.8, Gluc = 221. Xeloda 8 pills 2 weeks on / 1 week off; started 11/16/2023 and last dose 2/29/24 AM. Avastin q 3 weeks, last dose = 2/27/24.  3/20/2024 Patient returned from vacation. Feels well and would like to move forward with treatment. We re-reviewed the "Randomized Phase 2 Study of DKN-01 Plus FOLFIRI/FOLFOX and Bevacizumab Versus FOLFIRI/FOLFOX and Bevacizumab as Second-line Treatment of Advanced Colorectal Cancer (DeFianCe)" He and his wife agreed to move forward and a consent process was performed. I have ordered MRI head and repeat imaging, as well as repeat labs. His Total bilirubin was elevated prior visit, and is now improving. There is evidence of Gilbert's disease in terms of heterozygous UGT1A1  Repeat bilirubin today decreased to 2.4 (was 3.4) Will review with sponsor about the bilirubin and if this level acceptable for eligibility.  4/9/2024 C1D1 FOLFIRI + Bevacizumab 4/1/24 5-FU 2400 mg/m2; 5-FU  mg/m2; Irinotecan 180 mg/m2 + Bevacizumab 5 mg/kg Patient has had FOLFOX + Jennifer in the past. We re-reviewed adverse events. He feels that his tolerance was pretty good. He notes that when is off of dexamethasone on day 4, that fatigue is increased. Stool - loose stool up to 3 times and did not need immodium. Hiccoughs - on day 2 and 3 of treatment, and will look into use of Emend since not on DKN-01  4/16/2024 C1D1 FOLFIRI + Bevacizumab 4/1/24 5-FU 2400 mg/m2; 5-FU  mg/m2; Irinotecan 180 mg/m2 + Bevacizumab 5 mg/kg Today is C2D1. Will be delayed 1 week due to ANC < 1000. Will change treatment dosing: Stop 5FU IVP, and lower irinotecan to 150. New treatment will be 5-FU 2400 mg/m2,  mg.m2, Irinotecan 150 mg/m2 + Bevacizumab 5 mg/kg. Will give Zarxio 480 mcg daily for 3 days.  04/23 C2D1 FOLFIRI + JENNIFER today 5-FU 2400 mg/m2; Irinotecan 150 mg/m2; Jennifer 5 mg/kg. Doing relatively well today. Admits to diarrhea during one week of his treatment which has subsided. At most had 1-3 bowel movements on those days.  Did not take Imodium. He noticed some mild lower back pain 2/2 from Zarxio injection.  He did take Claritin on days of treatment + PRN advil. Last week was having pain in his gum and was found to have an infected crown. His dentist placed him on Amoxicillin which he completed. No other major adverse events from treatment.  5/7/24 C3D1 FOLFIRI + JENNIFER today 5-FU 2400 mg/m2; Irinotecan 150 mg/m2; Jennifer 5 mg/kg.. [place] ED visit: On D#5 of his cycle patient reports that he was outside gardening when he had lower back pain went into house to take a warm bath.  Patient reports that when he went to go get a warm bath felt lightheaded denies passing out.  No closed head injury.  No chest pain shortness of breath or back pain at this time.   Patient reports that he has history of low blood pressure after receiving chemo.   He was given IVF x 2 liters and sent home. His SCr was elevated 2/2 to pre-renal (dehydration). He felt better afterwards and was back to baseline. Denies diarrhea now.  Generally admits to diarrhea x one week after chemotherapy and admits to 1-3 episodes on those days. Will repeat CBC with diff during NILAM period next week at local [facility].  6/4/24 C5D1 FOLFIRI + JENNIFER today 5-FU 2400 mg/m2; Irinotecan 150 mg/m2; Jennifer 5 mg/kg CT after C#4 on 5/28 revealed response from therapy. Bilateral pulmonary metastases, mildly decreased in size. Soft tissue along the left side of the celiac axis does not appear significantly changed. Extension inferiorly versus adjacent para-aortic lymph node with calcification has minimally decreased. Right hemicolectomy with postoperative changes. He is doing relatively well on chemotherapy. No major adverse reaction. Denies diarrhea. Fatigue noted on days 4-8 of his cycle.  Abates afterwards. Continues to work.  No issues. Appetite is better.

## 2024-06-18 NOTE — PHYSICAL EXAM
[Fully active, able to carry on all pre-disease performance without restriction] : Status 0 - Fully active, able to carry on all pre-disease performance without restriction [Normal] : affect appropriate [de-identified] : anicteric [de-identified] : right chest wall mediport c/d/i [de-identified] : 3x3 non blanching macule on left elbow, PPE grade 2 on hands

## 2024-06-20 ENCOUNTER — APPOINTMENT (OUTPATIENT)
Dept: INFUSION THERAPY | Facility: HOSPITAL | Age: 59
End: 2024-06-20

## 2024-06-24 ENCOUNTER — NON-APPOINTMENT (OUTPATIENT)
Age: 59
End: 2024-06-24

## 2024-07-01 ENCOUNTER — LABORATORY RESULT (OUTPATIENT)
Age: 59
End: 2024-07-01

## 2024-07-02 ENCOUNTER — NON-APPOINTMENT (OUTPATIENT)
Age: 59
End: 2024-07-02

## 2024-07-03 ENCOUNTER — APPOINTMENT (OUTPATIENT)
Dept: INFUSION THERAPY | Facility: HOSPITAL | Age: 59
End: 2024-07-03

## 2024-07-03 ENCOUNTER — APPOINTMENT (OUTPATIENT)
Dept: HEMATOLOGY ONCOLOGY | Facility: CLINIC | Age: 59
End: 2024-07-03
Payer: COMMERCIAL

## 2024-07-03 ENCOUNTER — NON-APPOINTMENT (OUTPATIENT)
Age: 59
End: 2024-07-03

## 2024-07-03 PROCEDURE — G2211 COMPLEX E/M VISIT ADD ON: CPT | Mod: NC,1L

## 2024-07-03 PROCEDURE — 99214 OFFICE O/P EST MOD 30 MIN: CPT

## 2024-07-03 PROCEDURE — 93005 ELECTROCARDIOGRAM TRACING: CPT

## 2024-07-05 ENCOUNTER — APPOINTMENT (OUTPATIENT)
Dept: INFUSION THERAPY | Facility: HOSPITAL | Age: 59
End: 2024-07-05

## 2024-07-07 ENCOUNTER — NON-APPOINTMENT (OUTPATIENT)
Age: 59
End: 2024-07-07

## 2024-07-09 ENCOUNTER — NON-APPOINTMENT (OUTPATIENT)
Age: 59
End: 2024-07-09

## 2024-07-13 ENCOUNTER — LABORATORY RESULT (OUTPATIENT)
Age: 59
End: 2024-07-13

## 2024-07-14 LAB
ALBUMIN SERPL ELPH-MCNC: 3.9 G/DL
ALP BLD-CCNC: 62 U/L
ALT SERPL-CCNC: 43 U/L
ANION GAP SERPL CALC-SCNC: 13 MMOL/L
AST SERPL-CCNC: 28 U/L
BASOPHILS # BLD AUTO: 0.03 K/UL
BASOPHILS NFR BLD AUTO: 0.7 %
BILIRUB SERPL-MCNC: 0.5 MG/DL
BUN SERPL-MCNC: 16 MG/DL
CALCIUM SERPL-MCNC: 9.5 MG/DL
CEA SERPL-MCNC: 37.9 NG/ML
CHLORIDE SERPL-SCNC: 98 MMOL/L
CO2 SERPL-SCNC: 22 MMOL/L
CREAT SERPL-MCNC: 0.81 MG/DL
EGFR: 103 ML/MIN/1.73M2
EOSINOPHIL # BLD AUTO: 0.08 K/UL
EOSINOPHIL NFR BLD AUTO: 2 %
GLUCOSE SERPL-MCNC: 152 MG/DL
HCT VFR BLD CALC: 39.2 %
HGB BLD-MCNC: 13.2 G/DL
IMM GRANULOCYTES NFR BLD AUTO: 0.5 %
LYMPHOCYTES # BLD AUTO: 1.59 K/UL
LYMPHOCYTES NFR BLD AUTO: 39.7 %
MAGNESIUM SERPL-MCNC: 1.9 MG/DL
MAN DIFF?: NORMAL
MCHC RBC-ENTMCNC: 32 PG
MCHC RBC-ENTMCNC: 33.7 GM/DL
MCV RBC AUTO: 94.9 FL
MONOCYTES # BLD AUTO: 0.41 K/UL
MONOCYTES NFR BLD AUTO: 10.2 %
NEUTROPHILS # BLD AUTO: 1.88 K/UL
NEUTROPHILS NFR BLD AUTO: 46.9 %
PLATELET # BLD AUTO: 132 K/UL
POTASSIUM SERPL-SCNC: 5 MMOL/L
PROT SERPL-MCNC: 6.1 G/DL
RBC # BLD: 4.13 M/UL
RBC # FLD: 15.8 %
SODIUM SERPL-SCNC: 133 MMOL/L
WBC # FLD AUTO: 4.01 K/UL

## 2024-07-16 ENCOUNTER — NON-APPOINTMENT (OUTPATIENT)
Age: 59
End: 2024-07-16

## 2024-07-16 ENCOUNTER — APPOINTMENT (OUTPATIENT)
Dept: INFUSION THERAPY | Facility: HOSPITAL | Age: 59
End: 2024-07-16

## 2024-07-16 ENCOUNTER — APPOINTMENT (OUTPATIENT)
Dept: HEMATOLOGY ONCOLOGY | Facility: CLINIC | Age: 59
End: 2024-07-16
Payer: COMMERCIAL

## 2024-07-16 DIAGNOSIS — C18.9 MALIGNANT NEOPLASM OF COLON, UNSPECIFIED: ICD-10-CM

## 2024-07-16 DIAGNOSIS — C78.7 MALIGNANT NEOPLASM OF COLON, UNSPECIFIED: ICD-10-CM

## 2024-07-16 PROCEDURE — G2211 COMPLEX E/M VISIT ADD ON: CPT | Mod: NC,1L

## 2024-07-16 PROCEDURE — 99214 OFFICE O/P EST MOD 30 MIN: CPT

## 2024-07-16 PROCEDURE — 93005 ELECTROCARDIOGRAM TRACING: CPT

## 2024-07-17 ENCOUNTER — NON-APPOINTMENT (OUTPATIENT)
Age: 59
End: 2024-07-17

## 2024-07-18 ENCOUNTER — APPOINTMENT (OUTPATIENT)
Dept: INFUSION THERAPY | Facility: HOSPITAL | Age: 59
End: 2024-07-18

## 2024-07-23 ENCOUNTER — APPOINTMENT (OUTPATIENT)
Dept: CT IMAGING | Facility: CLINIC | Age: 59
End: 2024-07-23
Payer: SUBSIDIZED

## 2024-07-23 PROCEDURE — 71260 CT THORAX DX C+: CPT | Mod: 26

## 2024-07-23 PROCEDURE — 74177 CT ABD & PELVIS W/CONTRAST: CPT | Mod: 26

## 2024-07-27 ENCOUNTER — LABORATORY RESULT (OUTPATIENT)
Age: 59
End: 2024-07-27

## 2024-07-28 LAB
ALBUMIN SERPL ELPH-MCNC: 3.8 G/DL
ALP BLD-CCNC: 64 U/L
ALT SERPL-CCNC: 42 U/L
ANION GAP SERPL CALC-SCNC: 13 MMOL/L
AST SERPL-CCNC: 27 U/L
BASOPHILS # BLD AUTO: 0.02 K/UL
BASOPHILS NFR BLD AUTO: 0.4 %
BILIRUB SERPL-MCNC: 0.4 MG/DL
BUN SERPL-MCNC: 12 MG/DL
CALCIUM SERPL-MCNC: 9.3 MG/DL
CEA SERPL-MCNC: 34.2 NG/ML
CHLORIDE SERPL-SCNC: 102 MMOL/L
CO2 SERPL-SCNC: 24 MMOL/L
CREAT SERPL-MCNC: 0.74 MG/DL
EGFR: 106 ML/MIN/1.73M2
EOSINOPHIL # BLD AUTO: 0.07 K/UL
EOSINOPHIL NFR BLD AUTO: 1.6 %
GLUCOSE SERPL-MCNC: 135 MG/DL
HCT VFR BLD CALC: 39.3 %
HGB BLD-MCNC: 12.4 G/DL
IMM GRANULOCYTES NFR BLD AUTO: 0.2 %
LYMPHOCYTES # BLD AUTO: 1.17 K/UL
LYMPHOCYTES NFR BLD AUTO: 25.9 %
MAN DIFF?: NORMAL
MCHC RBC-ENTMCNC: 31.6 GM/DL
MCHC RBC-ENTMCNC: 32 PG
MCV RBC AUTO: 101.3 FL
MONOCYTES # BLD AUTO: 0.55 K/UL
MONOCYTES NFR BLD AUTO: 12.2 %
NEUTROPHILS # BLD AUTO: 2.69 K/UL
NEUTROPHILS NFR BLD AUTO: 59.7 %
PLATELET # BLD AUTO: 109 K/UL
POTASSIUM SERPL-SCNC: 5.2 MMOL/L
PROT SERPL-MCNC: 5.6 G/DL
RBC # BLD: 3.88 M/UL
RBC # FLD: 16.7 %
SODIUM SERPL-SCNC: 139 MMOL/L
WBC # FLD AUTO: 4.51 K/UL

## 2024-07-29 ENCOUNTER — NON-APPOINTMENT (OUTPATIENT)
Age: 59
End: 2024-07-29

## 2024-07-30 ENCOUNTER — APPOINTMENT (OUTPATIENT)
Dept: HEMATOLOGY ONCOLOGY | Facility: CLINIC | Age: 59
End: 2024-07-30
Payer: COMMERCIAL

## 2024-07-30 ENCOUNTER — APPOINTMENT (OUTPATIENT)
Dept: INFUSION THERAPY | Facility: HOSPITAL | Age: 59
End: 2024-07-30

## 2024-07-30 DIAGNOSIS — C78.7 MALIGNANT NEOPLASM OF COLON, UNSPECIFIED: ICD-10-CM

## 2024-07-30 DIAGNOSIS — C18.9 MALIGNANT NEOPLASM OF COLON, UNSPECIFIED: ICD-10-CM

## 2024-07-30 PROCEDURE — 99214 OFFICE O/P EST MOD 30 MIN: CPT

## 2024-07-30 PROCEDURE — G2211 COMPLEX E/M VISIT ADD ON: CPT | Mod: NC

## 2024-07-30 PROCEDURE — 93005 ELECTROCARDIOGRAM TRACING: CPT

## 2024-07-30 PROCEDURE — 93010 ELECTROCARDIOGRAM REPORT: CPT

## 2024-07-30 NOTE — HISTORY OF PRESENT ILLNESS
[de-identified] : Mr. Mccarthy was found to have mild iron deficiency anemia of routine blood work at the end of 2020. Colonoscopy revealed a large tumor in the proximal transverse colon. He had no GI symptoms. A CT of the chest, abdomen and pelvis in January, 2021 revealed no evidence of metastatic disease.  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 There was lymphovascular invasion, with 0 of 14 lymph nodes involved with cancer ([id number], high-risk stage II). Proficient mismatch repair.  He received adjuvant capecitabine x 6 months 3/4/21-end of August 2021 with Dr. Lula Sumner, with minimal toxicity. His CEA was persistently elevated near 7, with multiple negative scans.  ctDNA testing in January, 2022 was unrevealing.  In August, 2022 CT scans showed several enlarging paraaortic lymph nodes (reference node 2.3 x 1.1cm, previously 1.4 x 0.5cm) and two (bilateral) subcentimeter lung nodules. LYMPH NODE BIOPSY AND CYTOLOGIC PREPARATION (ABDOMINAL AORTOCAVAL LYMPH NODE): - METASTATIC ADENOCARCINOMA WITH NECROSIS; MORPHOLOGICALLY CONSISTENT WITH PATIENT'S PREVIOUSLY DIAGNOSED COLONIC ADENOCARCINOMA.  Mr. Mccarthy has been back to Dr. Sumner, was offered FOLFOX + bevacizumab. He has also seen doctors from [facility], who offered him watchful waiting versus irinotecan. His friend, Norbert Borjas is my patient, and suggested he see me as third opinion. Found to have stable aortocaval lymph nodes previously noted on the PET scan on CT imaging at the end of October, 2022.  An IR-guided biopsy of the aortocaval node shortly thereafter confirmed metastatic adenocarcinoma morphologically equivalent to his known colon primary. CT scan completed January, 2023 showed increase in size and number of lung metastases. New liver lesions suspicious for metastatic disease. Increase in size of a periceliac lymph node. Other retroperitoneal lymph nodes are stable.  Proceeded to XELOX + bevacizumab.  2/20/24 CAT Scan CAP COMPARISON: CT chest/abdomen/pelvis 10/31/2023 and 7/24/2023. LUNGS AND LARGE AIRWAYS: Patent central airways. Bilateral pulmonary nodules, many of which are cavitary, increased in size compared to prior. Reference: Right upper lobe nodule (3-49), 1.6 x 1.4 cm, previously 1.3 x 1.2 cm. Right upper lobe nodule (3-43), 1.5 cm, previously 1.1 cm, with increased cavitation. Left lower lobe nodule (3-24), 1.1 cm, previously 0.9 cm. PLEURA: No pleural effusion. VESSELS: Atherosclerotic changes of the coronary arteries. HEART: Heart size is normal. No pericardial effusion. MEDIASTINUM AND SONIA: Prominent right hilar lymph node (2-65), 1.1 x 1.1 cm, previously 1.4 x 1.1 cm. CHEST WALL AND LOWER NECK: Right anterior chest wall infusion port with tip in the SVC. LIVER: Caudate lobe hypoattenuating lesion (3-135), 1.5 x 1.2 cm, previously 1.6 x 1.1 cm. BILE DUCTS: Normal caliber. GALLBLADDER: Cholecystectomy. SPLEEN: Within normal limits. PANCREAS: Within normal limits. ADRENALS: Within normal limits. KIDNEYS/URETERS: Bilateral symmetric enhancement. No hydronephrosis. Punctate nonobstructing left renal interpole calculus. BLADDER: Minimally distended. REPRODUCTIVE ORGANS: Prostate within normal limits. BOWEL: Small hiatal hernia. Right hemicolectomy. No bowel obstruction. PERITONEUM: No ascites. Cystic lesion adjacent to the 4th portion of the duodenum (3-181), 1.9 x 1.5 cm, may represent lymphangioma or duplication cyst. VESSELS: Atherosclerotic changes. Left hepatic artery arising from the left gastric artery, anatomic variant. RETROPERITONEUM/LYMPH NODES: Increased retroperitoneal soft tissue with encasement of the the celiac axis, proximal common hepatic and left gastric arteries.  Partially calcified left para-aortic node (3-146), 2.1 x 1 cm, stable. ABDOMINAL WALL: Postsurgical changes. Tiny fat-containing umbilical hernia. BONES: Degenerative changes. IMPRESSION: Bilateral pulmonary nodules, many of which are cavitary, increased in size compared to prior 10/31/2023. Increased retroperitoneal soft tissue with encasement of the celiac axis, proximal common hepatic and left gastric arteries. Stable hepatic lesion.  Review of labs from 2/27/2024: CEA = 57.6, T bili = 2.8, Gluc = 221. Xeloda 8 pills 2 weeks on / 1 week off; started 11/16/2023 and last dose 2/29/24 AM. Avastin q 3 weeks, last dose = 2/27/24.  3/20/2024 Patient returned from vacation. Feels well and would like to move forward with treatment. We re-reviewed the "Randomized Phase 2 Study of DKN-01 Plus FOLFIRI/FOLFOX and Bevacizumab Versus FOLFIRI/FOLFOX and Bevacizumab as Second-line Treatment of Advanced Colorectal Cancer (DeFianCe)" He and his wife agreed to move forward and a consent process was performed. I have ordered MRI head and repeat imaging, as well as repeat labs. His Total bilirubin was elevated prior visit, and is now improving. There is evidence of Gilbert's disease in terms of heterozygous UGT1A1  Repeat bilirubin today decreased to 2.4 (was 3.4) Will review with sponsor about the bilirubin and if this level acceptable for eligibility.  4/9/2024 C1D1 FOLFIRI + Bevacizumab 4/1/24 5-FU 2400 mg/m2; 5-FU  mg/m2; Irinotecan 180 mg/m2 + Bevacizumab 5 mg/kg Patient has had FOLFOX + Jennifer in the past. We re-reviewed adverse events. He feels that his tolerance was pretty good. He notes that when is off of dexamethasone on day 4, that fatigue is increased. Stool - loose stool up to 3 times and did not need immodium. Hiccoughs - on day 2 and 3 of treatment, and will look into use of Emend since not on DKN-01  4/16/2024 C1D1 FOLFIRI + Bevacizumab 4/1/24 5-FU 2400 mg/m2; 5-FU  mg/m2; Irinotecan 180 mg/m2 + Bevacizumab 5 mg/kg Today is C2D1. Will be delayed 1 week due to ANC < 1000. Will change treatment dosing: Stop 5FU IVP, and lower irinotecan to 150. New treatment will be 5-FU 2400 mg/m2,  mg.m2, Irinotecan 150 mg/m2 + Bevacizumab 5 mg/kg. Will give Zarxio 480 mcg daily for 3 days.  04/23 C2D1 FOLFIRI + JENNIFER today 5-FU 2400 mg/m2; Irinotecan 150 mg/m2; Jennifer 5 mg/kg. Doing relatively well today. Admits to diarrhea during one week of his treatment which has subsided. At most had 1-3 bowel movements on those days.  Did not take Imodium. He noticed some mild lower back pain 2/2 from Zarxio injection.  He did take Claritin on days of treatment + PRN advil. Last week was having pain in his gum and was found to have an infected crown. His dentist placed him on Amoxicillin which he completed. No other major adverse events from treatment.  5/7/24 C3D1 FOLFIRI + JENNIFER today 5-FU 2400 mg/m2; Irinotecan 150 mg/m2; Jennifer 5 mg/kg.. [place] ED visit: On D#5 of his cycle patient reports that he was outside gardening when he had lower back pain went into house to take a warm bath.  Patient reports that when he went to go get a warm bath felt lightheaded denies passing out.  No closed head injury.  No chest pain shortness of breath or back pain at this time.   Patient reports that he has history of low blood pressure after receiving chemo.   He was given IVF x 2 liters and sent home. His SCr was elevated 2/2 to pre-renal (dehydration). He felt better afterwards and was back to baseline. Denies diarrhea now.  Generally admits to diarrhea x one week after chemotherapy and admits to 1-3 episodes on those days. Will repeat CBC with diff during NILAM period next week at local [facility].  6/4/24 C5D1 FOLFIRI + JENNIFER today 5-FU 2400 mg/m2; Irinotecan 150 mg/m2; Jennifer 5 mg/kg CT after C#4 on 5/28 revealed response from therapy. Bilateral pulmonary metastases, mildly decreased in size. Soft tissue along the left side of the celiac axis does not appear significantly changed. Extension inferiorly versus adjacent para-aortic lymph node with calcification has minimally decreased. Right hemicolectomy with postoperative changes. He is doing relatively well on chemotherapy. No major adverse reaction. Denies diarrhea. Fatigue noted on days 4-8 of his cycle.  Abates afterwards. Continues to work.  No issues. Appetite is better.  7/3/24 C7 FOLFIRI + JENNIFER 5-FU 2400 mg/m2; Irinotecan 150 mg/m2; Jennifer 5 mg/kg Premeds changed due to constipation. He will receive Zofran instead of Aloxi on days 1-3. Will use Dex as well. Doing well. No major complaints. Continues to work. Labs reviewed. CT scheduled for 7/23 7/16/24 C8 FOLFIRI + JENNIFER 5-FU 2400 mg/m2; Irinotecan 150 mg/m2; Jennifer 5 mg/kg Patient admits to intermittent episodes of syncope in the past which has subsided. No recent episodes. CEA is trending downwards. All other labs WNL. CT scheduled for 7/23/24 His BP has been on the lower end and is currently on HCTZ + Lisinopril Patient plans on travelling during the week of his 13th cycle.   7/30/24 C9 FOLFIRI + JENNIFER 5-FU 2400 mg/m2; Irinotecan 150 mg/m2; Jennifer 5 mg/kg CT on 7/23/24 revealed stable disease. Both CEA and CA 19-9 are trending downwards. He stopped taking HCTZ due to hypotension. His systolic BP < 110s. His constipation has improved. He took stool softeners on days of treatment. Not using Dex on days 2+3. He continues to work. Plan on traveling in a few weeks.   [de-identified] : PCP: Evaristo Lizama

## 2024-07-30 NOTE — PHYSICAL EXAM
[Fully active, able to carry on all pre-disease performance without restriction] : Status 0 - Fully active, able to carry on all pre-disease performance without restriction [Normal] : affect appropriate [de-identified] : anicteric [de-identified] : right chest wall mediport c/d/i [de-identified] : 3x3 non blanching macule on left elbow, PPE grade 2 on hands

## 2024-07-30 NOTE — ASSESSMENT
[With Patient/Caregiver] : With Patient/Caregiver [Designated Health Care Proxy] : Designated Health Care Proxy [Relationship: ___] : Relationship: [unfilled] [FreeTextEntry1] : Patient underwent right hemicolectomy on January, 27, 2021 with pathology consistent with poorly differentiated adenocarcinoma measuring 2.5 cm, infiltrating into pericolonic tissue. There was lymphovascular invasion, with 0 of 14 lymph nodes involved with cancer ([id number], high-risk stage II). Proficient mismatch repair.   He received adjuvant capecitabine x 6 months 3/4/21-end of August 2021.  Genetic testing MyRisk = negative.  CAT scans 8/2023 showed portal nodes, and IR biospy on 8/22/22 is positive for relapsed metastatic adenocarcinoma. F1CDx pan-LEONARD wild type, TMB = 4, TPS = 0%.  XELOX + bevacizumab started 2/7/2023. oxaliplatin 130 mg/m2 + bevacizumab 10 mg/kg + capecitabine q3 week cycles. He has 8 cycles, the last of which was 7/5/2023. He was then on maintenance therapy with Avastin (last dose 2/27/24) + Xeloda (last 2/29/24 AM)  We re-reviewed the "Randomized Phase 2 Study of DKN-01 Plus FOLFIRI/FOLFOX and Bevacizumab Versus FOLFIRI/FOLFOX and Bevacizumab as Second-line Treatment of Advanced Colorectal Cancer (DeFianCe)" He and his wife agreed to move forward and a consent process was performed. I have ordered MRI head and repeat imaging, as well as repeat labs.  FOLFIRI + Bevacizumab: C1D1 FOLFIRI + Bevacizumab 4/1/24 5-FU 2400 mg/m2; 5-FU  mg/m2; Irinotecan 180 mg/m2 + Bevacizumab 5 mg/kg C2D1 04/23/2024 C3D1 5/7/24 C4D1 5/21/24 C5D1 6/4/24 C6D1 FOLFIRI + KORIN 6/18/24 C7 7/3/24 C8 7/16/24 C9 7/30 5-FU 2400 mg/m2; Irinotecan 150 mg/m2; Korin 5 mg/kg..  Can consider future EGFRi with hyperselection criteria given the right sided (transverse) origin of the colon cancer. Patient is wild type for NRAS KRAS and BRAF.  C9 FOLFIRI + KORIN today  CT revealed stable disease on 7/23/24 Will repeat after C12  Both CEA and CA 19-9 is trending downwards. All other labs WNL. Signatera is pending.   His BP has been on the lower end and is currently on HCTZ + Lisinopril 40 mg daily. He discussed with his internist and DC HCTZ.  Systolic BP ~ <110s at home.  Patient plans on travelling during the week of his 13th cycle.  Vitals stable.  f/u q cycle as per research.   [AdvancecareDate] : 07/03/24

## 2024-07-31 ENCOUNTER — NON-APPOINTMENT (OUTPATIENT)
Age: 59
End: 2024-07-31

## 2024-08-01 ENCOUNTER — APPOINTMENT (OUTPATIENT)
Dept: INFUSION THERAPY | Facility: HOSPITAL | Age: 59
End: 2024-08-01

## 2024-08-10 ENCOUNTER — LABORATORY RESULT (OUTPATIENT)
Age: 59
End: 2024-08-10

## 2024-08-10 LAB
BASOPHILS # BLD AUTO: 0.01 K/UL
BASOPHILS NFR BLD AUTO: 0.3 %
EOSINOPHIL # BLD AUTO: 0.06 K/UL
EOSINOPHIL NFR BLD AUTO: 1.7 %
HCT VFR BLD CALC: 39.3 %
HGB BLD-MCNC: 12.7 G/DL
IMM GRANULOCYTES NFR BLD AUTO: 0 %
LYMPHOCYTES # BLD AUTO: 1.23 K/UL
LYMPHOCYTES NFR BLD AUTO: 34.2 %
MAN DIFF?: NORMAL
MCHC RBC-ENTMCNC: 31.5 PG
MCHC RBC-ENTMCNC: 32.3 GM/DL
MCV RBC AUTO: 97.5 FL
MONOCYTES # BLD AUTO: 0.45 K/UL
MONOCYTES NFR BLD AUTO: 12.5 %
NEUTROPHILS # BLD AUTO: 1.85 K/UL
NEUTROPHILS NFR BLD AUTO: 51.3 %
PLATELET # BLD AUTO: 112 K/UL
RBC # BLD: 4.03 M/UL
RBC # FLD: 15.7 %
WBC # FLD AUTO: 3.6 K/UL

## 2024-08-11 LAB
ALBUMIN SERPL ELPH-MCNC: 3.8 G/DL
ALP BLD-CCNC: 65 U/L
ALT SERPL-CCNC: 40 U/L
ANION GAP SERPL CALC-SCNC: 10 MMOL/L
AST SERPL-CCNC: 27 U/L
BILIRUB SERPL-MCNC: 0.4 MG/DL
BUN SERPL-MCNC: 9 MG/DL
CALCIUM SERPL-MCNC: 9.2 MG/DL
CANCER AG19-9 SERPL-ACNC: 58 U/ML
CEA SERPL-MCNC: 34.5 NG/ML
CHLORIDE SERPL-SCNC: 105 MMOL/L
CO2 SERPL-SCNC: 26 MMOL/L
CREAT SERPL-MCNC: 0.7 MG/DL
EGFR: 107 ML/MIN/1.73M2
GLUCOSE SERPL-MCNC: 129 MG/DL
MAGNESIUM SERPL-MCNC: 2 MG/DL
POTASSIUM SERPL-SCNC: 4.4 MMOL/L
PROT SERPL-MCNC: 5.6 G/DL
SODIUM SERPL-SCNC: 141 MMOL/L

## 2024-08-12 ENCOUNTER — NON-APPOINTMENT (OUTPATIENT)
Age: 59
End: 2024-08-12

## 2024-08-13 ENCOUNTER — APPOINTMENT (OUTPATIENT)
Dept: HEMATOLOGY ONCOLOGY | Facility: CLINIC | Age: 59
End: 2024-08-13
Payer: COMMERCIAL

## 2024-08-13 ENCOUNTER — NON-APPOINTMENT (OUTPATIENT)
Age: 59
End: 2024-08-13

## 2024-08-13 ENCOUNTER — APPOINTMENT (OUTPATIENT)
Dept: INFUSION THERAPY | Facility: HOSPITAL | Age: 59
End: 2024-08-13

## 2024-08-13 PROCEDURE — 93005 ELECTROCARDIOGRAM TRACING: CPT

## 2024-08-13 PROCEDURE — 99215 OFFICE O/P EST HI 40 MIN: CPT

## 2024-08-13 PROCEDURE — G2211 COMPLEX E/M VISIT ADD ON: CPT | Mod: NC

## 2024-08-13 NOTE — ASSESSMENT
[With Patient/Caregiver] : With Patient/Caregiver [Designated Health Care Proxy] : Designated Health Care Proxy [Relationship: ___] : Relationship: [unfilled] [FreeTextEntry1] : COLON ADENOCARCINOMA Patient underwent right hemicolectomy on January, 27, 2021 with pathology consistent with poorly differentiated adenocarcinoma measuring 2.5 cm, infiltrating into pericolonic tissue. There was lymphovascular invasion, with 0 of 14 lymph nodes involved with cancer ([id number], high-risk stage II). Proficient mismatch repair. He received adjuvant capecitabine x 6 months 3/4/21-end of August 2021.  Genetic testing MyRisk = negative.  CAT scans 8/2023 showed portal nodes, and IR biospy on 8/22/22 is positive for relapsed metastatic adenocarcinoma. F1CDx pan-LEONARD wild type, TMB = 4, TPS = 0%.  XELOX + bevacizumab started 2/7/2023. oxaliplatin 130 mg/m2 + bevacizumab 10 mg/kg + capecitabine q3 week cycles. He has 8 cycles, the last of which was 7/5/2023. He was then on maintenance therapy with Avastin (last dose 2/27/24) + Xeloda (last 2/29/24 AM)  We re-reviewed the "Randomized Phase 2 Study of DKN-01 Plus FOLFIRI/FOLFOX and Bevacizumab Versus FOLFIRI/FOLFOX and Bevacizumab as Second-line Treatment of Advanced Colorectal Cancer (DeFianCe)" He and his wife agreed to move forward and a consent process was performed. I have ordered MRI head and repeat imaging, as well as repeat labs.  FOLFIRI + Bevacizumab: C1D1 FOLFIRI + Bevacizumab 4/1/24 5-FU 2400 mg/m2; 5-FU  mg/m2; Irinotecan 180 mg/m2 + Bevacizumab 5 mg/kg C2D1 04/23/2024 C6D1 FOLFIRI + KORIN 6/18/24 C7 7/3/24 C8 7/16/24 C9 7/30 5-FU 2400 mg/m2; Irinotecan 150 mg/m2; Korin 5 mg/kg..  CT revealed stable disease on 7/23/24 Will repeat CAT after C12 on 9/17/24. Will consider transition to maintenance irinotecan + korin at that time.  CEA has been stable. Signatera is still pending. I contacted rep to find out why result is delayed. Since tissue is too old, I will cancel it for now and will get new tissue in the future if needed.  Can consider future EGFRi with hyperselection criteria given the right sided (transverse) origin of the colon cancer. Patient is wild type for NRAS KRAS and BRAF. Check liquid DNA prior to proceeding.  Could also use Lonsurf + bevacizumab as future treamtent.  HYPERTENSION His BP has been on the lower end and is currently on HCTZ + Lisinopril 40 mg daily. He discussed with his internist and DC HCTZ.  Systolic BP ~ <110s at home.  SOCIAL  Patient plans on travelling during the week 9/9/24 (12th cycle), and will be 1 week delay.  Time = 45 minutes. [AdvancecareDate] : 07/03/24

## 2024-08-13 NOTE — HISTORY OF PRESENT ILLNESS
[de-identified] : Mr. Mccarthy was found to have mild iron deficiency anemia of routine blood work at the end of 2020. Colonoscopy revealed a large tumor in the proximal transverse colon. He had no GI symptoms. A CT of the chest, abdomen and pelvis in January, 2021 revealed no evidence of metastatic disease.  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 There was lymphovascular invasion, with 0 of 14 lymph nodes involved with cancer ([id number], high-risk stage II). Proficient mismatch repair.  He received adjuvant capecitabine x 6 months 3/4/21-end of August 2021 with Dr. Lula Sumner, with minimal toxicity. His CEA was persistently elevated near 7, with multiple negative scans.  ctDNA testing in January, 2022 was unrevealing.  In August, 2022 CT scans showed several enlarging paraaortic lymph nodes (reference node 2.3 x 1.1cm, previously 1.4 x 0.5cm) and two (bilateral) subcentimeter lung nodules. LYMPH NODE BIOPSY AND CYTOLOGIC PREPARATION (ABDOMINAL AORTOCAVAL LYMPH NODE): - METASTATIC ADENOCARCINOMA WITH NECROSIS; MORPHOLOGICALLY CONSISTENT WITH PATIENT'S PREVIOUSLY DIAGNOSED COLONIC ADENOCARCINOMA.  Mr. Mccarthy has been back to Dr. Sumner, was offered FOLFOX + bevacizumab. He has also seen doctors from [facility], who offered him watchful waiting versus irinotecan. His friend, Norbert Borjas is my patient, and suggested he see me as third opinion. Found to have stable aortocaval lymph nodes previously noted on the PET scan on CT imaging at the end of October, 2022.  An IR-guided biopsy of the aortocaval node shortly thereafter confirmed metastatic adenocarcinoma morphologically equivalent to his known colon primary. CT scan completed January, 2023 showed increase in size and number of lung metastases. New liver lesions suspicious for metastatic disease. Increase in size of a periceliac lymph node. Other retroperitoneal lymph nodes are stable.  Proceeded to XELOX + bevacizumab.  2/20/24 CAT Scan CAP COMPARISON: CT chest/abdomen/pelvis 10/31/2023 and 7/24/2023. LUNGS AND LARGE AIRWAYS: Patent central airways. Bilateral pulmonary nodules, many of which are cavitary, increased in size compared to prior. Reference: Right upper lobe nodule (3-49), 1.6 x 1.4 cm, previously 1.3 x 1.2 cm. Right upper lobe nodule (3-43), 1.5 cm, previously 1.1 cm, with increased cavitation. Left lower lobe nodule (3-24), 1.1 cm, previously 0.9 cm. PLEURA: No pleural effusion. VESSELS: Atherosclerotic changes of the coronary arteries. HEART: Heart size is normal. No pericardial effusion. MEDIASTINUM AND SONIA: Prominent right hilar lymph node (2-65), 1.1 x 1.1 cm, previously 1.4 x 1.1 cm. CHEST WALL AND LOWER NECK: Right anterior chest wall infusion port with tip in the SVC. LIVER: Caudate lobe hypoattenuating lesion (3-135), 1.5 x 1.2 cm, previously 1.6 x 1.1 cm. BILE DUCTS: Normal caliber. GALLBLADDER: Cholecystectomy. SPLEEN: Within normal limits. PANCREAS: Within normal limits. ADRENALS: Within normal limits. KIDNEYS/URETERS: Bilateral symmetric enhancement. No hydronephrosis. Punctate nonobstructing left renal interpole calculus. BLADDER: Minimally distended. REPRODUCTIVE ORGANS: Prostate within normal limits. BOWEL: Small hiatal hernia. Right hemicolectomy. No bowel obstruction. PERITONEUM: No ascites. Cystic lesion adjacent to the 4th portion of the duodenum (3-181), 1.9 x 1.5 cm, may represent lymphangioma or duplication cyst. VESSELS: Atherosclerotic changes. Left hepatic artery arising from the left gastric artery, anatomic variant. RETROPERITONEUM/LYMPH NODES: Increased retroperitoneal soft tissue with encasement of the the celiac axis, proximal common hepatic and left gastric arteries.  Partially calcified left para-aortic node (3-146), 2.1 x 1 cm, stable. ABDOMINAL WALL: Postsurgical changes. Tiny fat-containing umbilical hernia. BONES: Degenerative changes. IMPRESSION: Bilateral pulmonary nodules, many of which are cavitary, increased in size compared to prior 10/31/2023. Increased retroperitoneal soft tissue with encasement of the celiac axis, proximal common hepatic and left gastric arteries. Stable hepatic lesion.  Review of labs from 2/27/2024: CEA = 57.6, T bili = 2.8, Gluc = 221. Xeloda 8 pills 2 weeks on / 1 week off; started 11/16/2023 and last dose 2/29/24 AM. Avastin q 3 weeks, last dose = 2/27/24.  3/20/2024 Patient returned from vacation. Feels well and would like to move forward with treatment. We re-reviewed the "Randomized Phase 2 Study of DKN-01 Plus FOLFIRI/FOLFOX and Bevacizumab Versus FOLFIRI/FOLFOX and Bevacizumab as Second-line Treatment of Advanced Colorectal Cancer (DeFianCe)" He and his wife agreed to move forward and a consent process was performed. I have ordered MRI head and repeat imaging, as well as repeat labs. His Total bilirubin was elevated prior visit, and is now improving. There is evidence of Gilbert's disease in terms of heterozygous UGT1A1  Repeat bilirubin today decreased to 2.4 (was 3.4) Will review with sponsor about the bilirubin and if this level acceptable for eligibility.  4/9/2024 C1D1 FOLFIRI + Bevacizumab 4/1/24 5-FU 2400 mg/m2; 5-FU  mg/m2; Irinotecan 180 mg/m2 + Bevacizumab 5 mg/kg Patient has had FOLFOX + Jennifer in the past. We re-reviewed adverse events. He feels that his tolerance was pretty good. He notes that when is off of dexamethasone on day 4, that fatigue is increased. Stool - loose stool up to 3 times and did not need immodium. Hiccoughs - on day 2 and 3 of treatment, and will look into use of Emend since not on DKN-01  4/16/2024 C1D1 FOLFIRI + Bevacizumab 4/1/24 5-FU 2400 mg/m2; 5-FU  mg/m2; Irinotecan 180 mg/m2 + Bevacizumab 5 mg/kg Today is C2D1. Will be delayed 1 week due to ANC < 1000. Will change treatment dosing: Stop 5FU IVP, and lower irinotecan to 150. New treatment will be 5-FU 2400 mg/m2,  mg.m2, Irinotecan 150 mg/m2 + Bevacizumab 5 mg/kg. Will give Zarxio 480 mcg daily for 3 days.  04/23 C2D1 FOLFIRI + JENNIFER today 5-FU 2400 mg/m2; Irinotecan 150 mg/m2; Jennifer 5 mg/kg. Doing relatively well today. Admits to diarrhea during one week of his treatment which has subsided. At most had 1-3 bowel movements on those days.  Did not take Imodium. He noticed some mild lower back pain 2/2 from Zarxio injection.  He did take Claritin on days of treatment + PRN advil. Last week was having pain in his gum and was found to have an infected crown. His dentist placed him on Amoxicillin which he completed. No other major adverse events from treatment.  5/7/24 C3D1 FOLFIRI + JENNIFER today 5-FU 2400 mg/m2; Irinotecan 150 mg/m2; Jennifer 5 mg/kg.. [place] ED visit: On D#5 of his cycle patient reports that he was outside gardening when he had lower back pain went into house to take a warm bath.  Patient reports that when he went to go get a warm bath felt lightheaded denies passing out.  No closed head injury.  No chest pain shortness of breath or back pain at this time.   Patient reports that he has history of low blood pressure after receiving chemo.   He was given IVF x 2 liters and sent home. His SCr was elevated 2/2 to pre-renal (dehydration). He felt better afterwards and was back to baseline. Denies diarrhea now.  Generally admits to diarrhea x one week after chemotherapy and admits to 1-3 episodes on those days. Will repeat CBC with diff during NILAM period next week at local [facility].  6/4/24 C5D1 FOLFIRI + JENNIFER today 5-FU 2400 mg/m2; Irinotecan 150 mg/m2; Jennifer 5 mg/kg CT after C#4 on 5/28 revealed response from therapy. Bilateral pulmonary metastases, mildly decreased in size. Soft tissue along the left side of the celiac axis does not appear significantly changed. Extension inferiorly versus adjacent para-aortic lymph node with calcification has minimally decreased. Right hemicolectomy with postoperative changes. He is doing relatively well on chemotherapy. No major adverse reaction. Denies diarrhea. Fatigue noted on days 4-8 of his cycle.  Abates afterwards. Continues to work.  No issues. Appetite is better.  7/3/24 C7 FOLFIRI + JENNIFER 5-FU 2400 mg/m2; Irinotecan 150 mg/m2; Jennifer 5 mg/kg Premeds changed due to constipation. He will receive Zofran instead of Aloxi on days 1-3. Will use Dex as well. Doing well. No major complaints. Continues to work. Labs reviewed. CT scheduled for 7/23 7/16/24 C8 FOLFIRI + JENNIFER 5-FU 2400 mg/m2; Irinotecan 150 mg/m2; Jennifer 5 mg/kg Patient admits to intermittent episodes of syncope in the past which has subsided. No recent episodes. CEA is trending downwards. All other labs WNL. CT scheduled for 7/23/24 His BP has been on the lower end and is currently on HCTZ + Lisinopril Patient plans on travelling during the week of his 13th cycle.   7/30/24 C9 FOLFIRI + JENNIFER 5-FU 2400 mg/m2; Irinotecan 150 mg/m2; Jennifer 5 mg/kg CT on 7/23/24 revealed stable disease. Both CEA and CA 19-9 are trending downwards. He stopped taking HCTZ due to hypotension. His systolic BP < 110s. His constipation has improved. He took stool softeners on days of treatment. Not using Dex on days 2+3. He continues to work. Plan on traveling in a few weeks.   [de-identified] : PCP: Evaristo Lizama

## 2024-08-13 NOTE — PHYSICAL EXAM
[Fully active, able to carry on all pre-disease performance without restriction] : Status 0 - Fully active, able to carry on all pre-disease performance without restriction [Normal] : affect appropriate [de-identified] : anicteric [de-identified] : right chest wall mediport c/d/i [de-identified] : 3x3 non blanching macule on left elbow, PPE grade 2 on hands

## 2024-08-15 ENCOUNTER — OUTPATIENT (OUTPATIENT)
Dept: OUTPATIENT SERVICES | Facility: HOSPITAL | Age: 59
LOS: 1 days | Discharge: HOME | End: 2024-08-15

## 2024-08-15 ENCOUNTER — APPOINTMENT (OUTPATIENT)
Dept: INFUSION THERAPY | Facility: CLINIC | Age: 59
End: 2024-08-15

## 2024-08-15 ENCOUNTER — APPOINTMENT (OUTPATIENT)
Dept: INFUSION THERAPY | Facility: HOSPITAL | Age: 59
End: 2024-08-15

## 2024-08-15 DIAGNOSIS — Z98.890 OTHER SPECIFIED POSTPROCEDURAL STATES: Chronic | ICD-10-CM

## 2024-08-15 DIAGNOSIS — Z90.49 ACQUIRED ABSENCE OF OTHER SPECIFIED PARTS OF DIGESTIVE TRACT: Chronic | ICD-10-CM

## 2024-08-15 DIAGNOSIS — C18.9 MALIGNANT NEOPLASM OF COLON, UNSPECIFIED: ICD-10-CM

## 2024-08-16 ENCOUNTER — APPOINTMENT (OUTPATIENT)
Dept: INFUSION THERAPY | Facility: HOSPITAL | Age: 59
End: 2024-08-16

## 2024-08-24 ENCOUNTER — LABORATORY RESULT (OUTPATIENT)
Age: 59
End: 2024-08-24

## 2024-08-27 ENCOUNTER — APPOINTMENT (OUTPATIENT)
Dept: INFUSION THERAPY | Facility: HOSPITAL | Age: 59
End: 2024-08-27

## 2024-08-27 ENCOUNTER — APPOINTMENT (OUTPATIENT)
Dept: HEMATOLOGY ONCOLOGY | Facility: CLINIC | Age: 59
End: 2024-08-27
Payer: COMMERCIAL

## 2024-08-27 ENCOUNTER — NON-APPOINTMENT (OUTPATIENT)
Age: 59
End: 2024-08-27

## 2024-08-27 DIAGNOSIS — C78.7 MALIGNANT NEOPLASM OF COLON, UNSPECIFIED: ICD-10-CM

## 2024-08-27 DIAGNOSIS — C18.9 MALIGNANT NEOPLASM OF COLON, UNSPECIFIED: ICD-10-CM

## 2024-08-27 PROCEDURE — 99214 OFFICE O/P EST MOD 30 MIN: CPT

## 2024-08-27 PROCEDURE — 93005 ELECTROCARDIOGRAM TRACING: CPT

## 2024-08-27 PROCEDURE — 93010 ELECTROCARDIOGRAM REPORT: CPT

## 2024-08-27 PROCEDURE — G2211 COMPLEX E/M VISIT ADD ON: CPT | Mod: NC

## 2024-08-27 NOTE — PHYSICAL EXAM
[Fully active, able to carry on all pre-disease performance without restriction] : Status 0 - Fully active, able to carry on all pre-disease performance without restriction [Normal] : affect appropriate [de-identified] : anicteric [de-identified] : right chest wall mediport c/d/i [de-identified] : 3x3 non blanching macule on left elbow, PPE grade 2 on hands

## 2024-08-27 NOTE — HISTORY OF PRESENT ILLNESS
[de-identified] : Mr. Mccarthy was found to have mild iron deficiency anemia of routine blood work at the end of 2020. Colonoscopy revealed a large tumor in the proximal transverse colon. He had no GI symptoms. A CT of the chest, abdomen and pelvis in January, 2021 revealed no evidence of metastatic disease.  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 There was lymphovascular invasion, with 0 of 14 lymph nodes involved with cancer ([id number], high-risk stage II). Proficient mismatch repair.  He received adjuvant capecitabine x 6 months 3/4/21-end of August 2021 with Dr. Lula Sumner, with minimal toxicity. His CEA was persistently elevated near 7, with multiple negative scans.  ctDNA testing in January, 2022 was unrevealing.  In August, 2022 CT scans showed several enlarging paraaortic lymph nodes (reference node 2.3 x 1.1cm, previously 1.4 x 0.5cm) and two (bilateral) subcentimeter lung nodules. LYMPH NODE BIOPSY AND CYTOLOGIC PREPARATION (ABDOMINAL AORTOCAVAL LYMPH NODE): - METASTATIC ADENOCARCINOMA WITH NECROSIS; MORPHOLOGICALLY CONSISTENT WITH PATIENT'S PREVIOUSLY DIAGNOSED COLONIC ADENOCARCINOMA.  Mr. Mccarthy has been back to Dr. Sumner, was offered FOLFOX + bevacizumab. He has also seen doctors from [facility] [place], who offered him watchful waiting versus irinotecan. His friend, Norbert Borjas is my patient, and suggested he see me as third opinion. Found to have stable aortocaval lymph nodes previously noted on the PET scan on CT imaging at the end of October, 2022.  An IR-guided biopsy of the aortocaval node shortly thereafter confirmed metastatic adenocarcinoma morphologically equivalent to his known colon primary. CT scan completed January, 2023 showed increase in size and number of lung metastases. New liver lesions suspicious for metastatic disease. Increase in size of a periceliac lymph node. Other retroperitoneal lymph nodes are stable.  Proceeded to XELOX + bevacizumab.  2/20/24 CAT Scan CAP COMPARISON: CT chest/abdomen/pelvis 10/31/2023 and 7/24/2023. LUNGS AND LARGE AIRWAYS: Patent central airways. Bilateral pulmonary nodules, many of which are cavitary, increased in size compared to prior. Reference: Right upper lobe nodule (3-49), 1.6 x 1.4 cm, previously 1.3 x 1.2 cm. Right upper lobe nodule (3-43), 1.5 cm, previously 1.1 cm, with increased cavitation. Left lower lobe nodule (3-24), 1.1 cm, previously 0.9 cm. PLEURA: No pleural effusion. VESSELS: Atherosclerotic changes of the coronary arteries. HEART: Heart size is normal. No pericardial effusion. MEDIASTINUM AND SONIA: Prominent right hilar lymph node (2-65), 1.1 x 1.1 cm, previously 1.4 x 1.1 cm. CHEST WALL AND LOWER NECK: Right anterior chest wall infusion port with tip in the SVC. LIVER: Caudate lobe hypoattenuating lesion (3-135), 1.5 x 1.2 cm, previously 1.6 x 1.1 cm. BILE DUCTS: Normal caliber. GALLBLADDER: Cholecystectomy. SPLEEN: Within normal limits. PANCREAS: Within normal limits. ADRENALS: Within normal limits. KIDNEYS/URETERS: Bilateral symmetric enhancement. No hydronephrosis. Punctate nonobstructing left renal interpole calculus. BLADDER: Minimally distended. REPRODUCTIVE ORGANS: Prostate within normal limits. BOWEL: Small hiatal hernia. Right hemicolectomy. No bowel obstruction. PERITONEUM: No ascites. Cystic lesion adjacent to the 4th portion of the duodenum (3-181), 1.9 x 1.5 cm, may represent lymphangioma or duplication cyst. VESSELS: Atherosclerotic changes. Left hepatic artery arising from the left gastric artery, anatomic variant. RETROPERITONEUM/LYMPH NODES: Increased retroperitoneal soft tissue with encasement of the the celiac axis, proximal common hepatic and left gastric arteries.  Partially calcified left para-aortic node (3-146), 2.1 x 1 cm, stable. ABDOMINAL WALL: Postsurgical changes. Tiny fat-containing umbilical hernia. BONES: Degenerative changes. IMPRESSION: Bilateral pulmonary nodules, many of which are cavitary, increased in size compared to prior 10/31/2023. Increased retroperitoneal soft tissue with encasement of the celiac axis, proximal common hepatic and left gastric arteries. Stable hepatic lesion.  Review of labs from 2/27/2024: CEA = 57.6, T bili = 2.8, Gluc = 221. Xeloda 8 pills 2 weeks on / 1 week off; started 11/16/2023 and last dose 2/29/24 AM. Avastin q 3 weeks, last dose = 2/27/24.  3/20/2024 Patient returned from vacation. Feels well and would like to move forward with treatment. We re-reviewed the "Randomized Phase 2 Study of DKN-01 Plus FOLFIRI/FOLFOX and Bevacizumab Versus FOLFIRI/FOLFOX and Bevacizumab as Second-line Treatment of Advanced Colorectal Cancer (DeFianCe)" He and his wife agreed to move forward and a consent process was performed. I have ordered MRI head and repeat imaging, as well as repeat labs. His Total bilirubin was elevated prior visit, and is now improving. There is evidence of Gilbert's disease in terms of heterozygous UGT1A1  Repeat bilirubin today decreased to 2.4 (was 3.4) Will review with sponsor about the bilirubin and if this level acceptable for eligibility.  4/9/2024 C1D1 FOLFIRI + Bevacizumab 4/1/24 5-FU 2400 mg/m2; 5-FU  mg/m2; Irinotecan 180 mg/m2 + Bevacizumab 5 mg/kg Patient has had FOLFOX + Jennifer in the past. We re-reviewed adverse events. He feels that his tolerance was pretty good. He notes that when is off of dexamethasone on day 4, that fatigue is increased. Stool - loose stool up to 3 times and did not need immodium. Hiccoughs - on day 2 and 3 of treatment, and will look into use of Emend since not on DKN-01  4/16/2024 C1D1 FOLFIRI + Bevacizumab 4/1/24 5-FU 2400 mg/m2; 5-FU  mg/m2; Irinotecan 180 mg/m2 + Bevacizumab 5 mg/kg Today is C2D1. Will be delayed 1 week due to ANC < 1000. Will change treatment dosing: Stop 5FU IVP, and lower irinotecan to 150. New treatment will be 5-FU 2400 mg/m2,  mg.m2, Irinotecan 150 mg/m2 + Bevacizumab 5 mg/kg. Will give Zarxio 480 mcg daily for 3 days.  04/23 C2D1 FOLFIRI + JENNIFER today 5-FU 2400 mg/m2; Irinotecan 150 mg/m2; Jennifer 5 mg/kg. Doing relatively well today. Admits to diarrhea during one week of his treatment which has subsided. At most had 1-3 bowel movements on those days.  Did not take Imodium. He noticed some mild lower back pain 2/2 from Zarxio injection.  He did take Claritin on days of treatment + PRN advil. Last week was having pain in his gum and was found to have an infected crown. His dentist placed him on Amoxicillin which he completed. No other major adverse events from treatment.  5/7/24 C3D1 FOLFIRI + JENNIFER today 5-FU 2400 mg/m2; Irinotecan 150 mg/m2; Jennifer 5 mg/kg.. [place] ED visit: On D#5 of his cycle patient reports that he was outside gardening when he had lower back pain went into house to take a warm bath.  Patient reports that when he went to go get a warm bath felt lightheaded denies passing out.  No closed head injury.  No chest pain shortness of breath or back pain at this time.   Patient reports that he has history of low blood pressure after receiving chemo.   He was given IVF x 2 liters and sent home. His SCr was elevated 2/2 to pre-renal (dehydration). He felt better afterwards and was back to baseline. Denies diarrhea now.  Generally admits to diarrhea x one week after chemotherapy and admits to 1-3 episodes on those days. Will repeat CBC with diff during NILAM period next week at local [facility].  6/4/24 C5D1 FOLFIRI + JENNIFER today 5-FU 2400 mg/m2; Irinotecan 150 mg/m2; Jennifer 5 mg/kg CT after C#4 on 5/28 revealed response from therapy. Bilateral pulmonary metastases, mildly decreased in size. Soft tissue along the left side of the celiac axis does not appear significantly changed. Extension inferiorly versus adjacent para-aortic lymph node with calcification has minimally decreased. Right hemicolectomy with postoperative changes. He is doing relatively well on chemotherapy. No major adverse reaction. Denies diarrhea. Fatigue noted on days 4-8 of his cycle.  Abates afterwards. Continues to work.  No issues. Appetite is better.  7/3/24 C7 FOLFIRI + JENNIFER 5-FU 2400 mg/m2; Irinotecan 150 mg/m2; Jennifer 5 mg/kg Premeds changed due to constipation. He will receive Zofran instead of Aloxi on days 1-3. Will use Dex as well. Doing well. No major complaints. Continues to work. Labs reviewed. CT scheduled for 7/23 7/16/24 C8 FOLFIRI + JENNIFER 5-FU 2400 mg/m2; Irinotecan 150 mg/m2; Jennifer 5 mg/kg Patient admits to intermittent episodes of syncope in the past which has subsided. No recent episodes. CEA is trending downwards. All other labs WNL. CT scheduled for 7/23/24 His BP has been on the lower end and is currently on HCTZ + Lisinopril Patient plans on travelling during the week of his 13th cycle.   7/30/24 C9 FOLFIRI + JENNIFER 5-FU 2400 mg/m2; Irinotecan 150 mg/m2; Jennifer 5 mg/kg CT on 7/23/24 revealed stable disease. Both CEA and CA 19-9 are trending downwards. He stopped taking HCTZ due to hypotension. His systolic BP < 110s. His constipation has improved. He took stool softeners on days of treatment. Not using Dex on days 2+3. He continues to work. Plan on traveling in a few weeks.  8/27 C11 FOLFIRI + JENNIFER 5-FU 2400 mg/m2; Irinotecan 150 mg/m2; Jennifer 5 mg/kg His BMs remain stable and is having 1-2 loose BMs/day which has not increased. Since induction of treatment no acute changes noted. C12 will be delayed x one week due to travel.   [de-identified] : PCP: Evaristo Lizama

## 2024-08-27 NOTE — ASSESSMENT
[With Patient/Caregiver] : With Patient/Caregiver [Designated Health Care Proxy] : Designated Health Care Proxy [Relationship: ___] : Relationship: [unfilled] [FreeTextEntry1] : COLON ADENOCARCINOMA Patient underwent right hemicolectomy on January, 27, 2021 with pathology consistent with poorly differentiated adenocarcinoma measuring 2.5 cm, infiltrating into pericolonic tissue. There was lymphovascular invasion, with 0 of 14 lymph nodes involved with cancer ([id number], high-risk stage II). Proficient mismatch repair. He received adjuvant capecitabine x 6 months 3/4/21-end of August 2021.  Genetic testing MyRisk = negative.  CAT scans 8/2023 showed portal nodes, and IR biospy on 8/22/22 is positive for relapsed metastatic adenocarcinoma. F1CDx pan-LEONARD wild type, TMB = 4, TPS = 0%.  XELOX + bevacizumab started 2/7/2023. oxaliplatin 130 mg/m2 + bevacizumab 10 mg/kg + capecitabine q3 week cycles. He has 8 cycles, the last of which was 7/5/2023. He was then on maintenance therapy with Avastin (last dose 2/27/24) + Xeloda (last 2/29/24 AM)  We re-reviewed the "Randomized Phase 2 Study of DKN-01 Plus FOLFIRI/FOLFOX and Bevacizumab Versus FOLFIRI/FOLFOX and Bevacizumab as Second-line Treatment of Advanced Colorectal Cancer (DeFianCe)" He and his wife agreed to move forward and a consent process was performed. I have ordered MRI head and repeat imaging, as well as repeat labs.  FOLFIRI + Bevacizumab: C1D1 FOLFIRI + Bevacizumab 4/1/24 5-FU 2400 mg/m2; 5-FU  mg/m2; Irinotecan 180 mg/m2 + Bevacizumab 5 mg/kg C2D1 04/23/2024 C6D1 FOLFIRI + KORIN 6/18/24 C7 7/3/24 C8 7/16/24 C9 7/30 C10 8/13 C11 8/27 5-FU 2400 mg/m2; Irinotecan 150 mg/m2; Korin 5 mg/kg..  CEA has been stable. Signatera is still pending. I contacted rep to find out why result is delayed. Since tissue is too old, I will cancel it for now and will get new tissue in the future if needed.  Can consider future EGFRi with hyperselection criteria given the right sided (transverse) origin of the colon cancer. Patient is wild type for NRAS KRAS and BRAF. Check liquid DNA prior to proceeding.  Could also use Lonsurf + bevacizumab as future treatment.  CT week of 9/23/24  Dr. Chavez agrees with above plan.   [AdvancecareDate] : 07/03/24

## 2024-08-28 ENCOUNTER — OUTPATIENT (OUTPATIENT)
Dept: OUTPATIENT SERVICES | Facility: HOSPITAL | Age: 59
LOS: 1 days | Discharge: HOME | End: 2024-08-28

## 2024-08-28 DIAGNOSIS — C18.9 MALIGNANT NEOPLASM OF COLON, UNSPECIFIED: ICD-10-CM

## 2024-08-28 DIAGNOSIS — Z98.890 OTHER SPECIFIED POSTPROCEDURAL STATES: Chronic | ICD-10-CM

## 2024-08-28 DIAGNOSIS — Z90.49 ACQUIRED ABSENCE OF OTHER SPECIFIED PARTS OF DIGESTIVE TRACT: Chronic | ICD-10-CM

## 2024-08-29 ENCOUNTER — APPOINTMENT (OUTPATIENT)
Dept: INFUSION THERAPY | Facility: HOSPITAL | Age: 59
End: 2024-08-29

## 2024-08-29 ENCOUNTER — APPOINTMENT (OUTPATIENT)
Dept: INFUSION THERAPY | Facility: CLINIC | Age: 59
End: 2024-08-29

## 2024-09-16 ENCOUNTER — LABORATORY RESULT (OUTPATIENT)
Age: 59
End: 2024-09-16

## 2024-09-16 ENCOUNTER — NON-APPOINTMENT (OUTPATIENT)
Age: 59
End: 2024-09-16

## 2024-09-17 ENCOUNTER — APPOINTMENT (OUTPATIENT)
Dept: INFUSION THERAPY | Facility: HOSPITAL | Age: 59
End: 2024-09-17

## 2024-09-17 ENCOUNTER — APPOINTMENT (OUTPATIENT)
Dept: HEMATOLOGY ONCOLOGY | Facility: CLINIC | Age: 59
End: 2024-09-17
Payer: COMMERCIAL

## 2024-09-17 ENCOUNTER — NON-APPOINTMENT (OUTPATIENT)
Age: 59
End: 2024-09-17

## 2024-09-17 DIAGNOSIS — C78.7 MALIGNANT NEOPLASM OF COLON, UNSPECIFIED: ICD-10-CM

## 2024-09-17 DIAGNOSIS — C18.9 MALIGNANT NEOPLASM OF COLON, UNSPECIFIED: ICD-10-CM

## 2024-09-17 PROCEDURE — 99214 OFFICE O/P EST MOD 30 MIN: CPT

## 2024-09-17 PROCEDURE — G2211 COMPLEX E/M VISIT ADD ON: CPT | Mod: NC

## 2024-09-17 PROCEDURE — 93005 ELECTROCARDIOGRAM TRACING: CPT

## 2024-09-17 NOTE — HISTORY OF PRESENT ILLNESS
[de-identified] : Mr. Mccarthy was found to have mild iron deficiency anemia of routine blood work at the end of 2020. Colonoscopy revealed a large tumor in the proximal transverse colon. He had no GI symptoms. A CT of the chest, abdomen and pelvis in January, 2021 revealed no evidence of metastatic disease.  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 There was lymphovascular invasion, with 0 of 14 lymph nodes involved with cancer ([id number], high-risk stage II). Proficient mismatch repair.  He received adjuvant capecitabine x 6 months 3/4/21-end of August 2021 with Dr. Lula Sumner, with minimal toxicity. His CEA was persistently elevated near 7, with multiple negative scans.  ctDNA testing in January, 2022 was unrevealing.  In August, 2022 CT scans showed several enlarging paraaortic lymph nodes (reference node 2.3 x 1.1cm, previously 1.4 x 0.5cm) and two (bilateral) subcentimeter lung nodules. LYMPH NODE BIOPSY AND CYTOLOGIC PREPARATION (ABDOMINAL AORTOCAVAL LYMPH NODE): - METASTATIC ADENOCARCINOMA WITH NECROSIS; MORPHOLOGICALLY CONSISTENT WITH PATIENT'S PREVIOUSLY DIAGNOSED COLONIC ADENOCARCINOMA.  Mr. Mccarthy has been back to Dr. Sumner, was offered FOLFOX + bevacizumab. [place] [facility] [place] watchful waiting versus irinotecan. His friend, Norbert Borjas is my patient, and suggested he see me as third opinion. Found to have stable aortocaval lymph nodes previously noted on the PET scan on CT imaging at the end of October, 2022.  An IR-guided biopsy of the aortocaval node shortly thereafter confirmed metastatic adenocarcinoma morphologically equivalent to his known colon primary. CT scan completed January, 2023 showed increase in size and number of lung metastases. New liver lesions suspicious for metastatic disease. Increase in size of a periceliac lymph node. Other retroperitoneal lymph nodes are stable.  Proceeded to XELOX + bevacizumab.  2/20/24 CAT Scan CAP COMPARISON: CT chest/abdomen/pelvis 10/31/2023 and 7/24/2023. LUNGS AND LARGE AIRWAYS: Patent central airways. Bilateral pulmonary nodules, many of which are cavitary, increased in size compared to prior. Reference: Right upper lobe nodule (3-49), 1.6 x 1.4 cm, previously 1.3 x 1.2 cm. Right upper lobe nodule (3-43), 1.5 cm, previously 1.1 cm, with increased cavitation. Left lower lobe nodule (3-24), 1.1 cm, previously 0.9 cm. PLEURA: No pleural effusion. VESSELS: Atherosclerotic changes of the coronary arteries. HEART: Heart size is normal. No pericardial effusion. MEDIASTINUM AND SONIA: Prominent right hilar lymph node (2-65), 1.1 x 1.1 cm, previously 1.4 x 1.1 cm. CHEST WALL AND LOWER NECK: Right anterior chest wall infusion port with tip in the SVC. LIVER: Caudate lobe hypoattenuating lesion (3-135), 1.5 x 1.2 cm, previously 1.6 x 1.1 cm. BILE DUCTS: Normal caliber. GALLBLADDER: Cholecystectomy. SPLEEN: Within normal limits. PANCREAS: Within normal limits. ADRENALS: Within normal limits. KIDNEYS/URETERS: Bilateral symmetric enhancement. No hydronephrosis. Punctate nonobstructing left renal interpole calculus. BLADDER: Minimally distended. REPRODUCTIVE ORGANS: Prostate within normal limits. BOWEL: Small hiatal hernia. Right hemicolectomy. No bowel obstruction. PERITONEUM: No ascites. Cystic lesion adjacent to the 4th portion of the duodenum (3-181), 1.9 x 1.5 cm, may represent lymphangioma or duplication cyst. VESSELS: Atherosclerotic changes. Left hepatic artery arising from the left gastric artery, anatomic variant. RETROPERITONEUM/LYMPH NODES: Increased retroperitoneal soft tissue with encasement of the the celiac axis, proximal common hepatic and left gastric arteries.  Partially calcified left para-aortic node (3-146), 2.1 x 1 cm, stable. ABDOMINAL WALL: Postsurgical changes. Tiny fat-containing umbilical hernia. BONES: Degenerative changes. IMPRESSION: Bilateral pulmonary nodules, many of which are cavitary, increased in size compared to prior 10/31/2023. Increased retroperitoneal soft tissue with encasement of the celiac axis, proximal common hepatic and left gastric arteries. Stable hepatic lesion.  Review of labs from 2/27/2024: CEA = 57.6, T bili = 2.8, Gluc = 221. Xeloda 8 pills 2 weeks on / 1 week off; started 11/16/2023 and last dose 2/29/24 AM. Avastin q 3 weeks, last dose = 2/27/24.  3/20/2024 Patient returned from vacation. Feels well and would like to move forward with treatment. We re-reviewed the "Randomized Phase 2 Study of DKN-01 Plus FOLFIRI/FOLFOX and Bevacizumab Versus FOLFIRI/FOLFOX and Bevacizumab as Second-line Treatment of Advanced Colorectal Cancer (DeFianCe)" He and his wife agreed to move forward and a consent process was performed. I have ordered MRI head and repeat imaging, as well as repeat labs. His Total bilirubin was elevated prior visit, and is now improving. There is evidence of Gilbert's disease in terms of heterozygous UGT1A1  Repeat bilirubin today decreased to 2.4 (was 3.4) Will review with sponsor about the bilirubin and if this level acceptable for eligibility.  4/9/2024 C1D1 FOLFIRI + Bevacizumab 4/1/24 5-FU 2400 mg/m2; 5-FU  mg/m2; Irinotecan 180 mg/m2 + Bevacizumab 5 mg/kg Patient has had FOLFOX + Jennifer in the past. We re-reviewed adverse events. He feels that his tolerance was pretty good. He notes that when is off of dexamethasone on day 4, that fatigue is increased. Stool - loose stool up to 3 times and did not need immodium. Hiccoughs - on day 2 and 3 of treatment, and will look into use of Emend since not on DKN-01  4/16/2024 C1D1 FOLFIRI + Bevacizumab 4/1/24 5-FU 2400 mg/m2; 5-FU  mg/m2; Irinotecan 180 mg/m2 + Bevacizumab 5 mg/kg Today is C2D1. Will be delayed 1 week due to ANC < 1000. Will change treatment dosing: Stop 5FU IVP, and lower irinotecan to 150. New treatment will be 5-FU 2400 mg/m2,  mg.m2, Irinotecan 150 mg/m2 + Bevacizumab 5 mg/kg. Will give Zarxio 480 mcg daily for 3 days.  04/23 C2D1 FOLFIRI + JENNIFER today 5-FU 2400 mg/m2; Irinotecan 150 mg/m2; Jennifer 5 mg/kg. Doing relatively well today. Admits to diarrhea during one week of his treatment which has subsided. At most had 1-3 bowel movements on those days.  Did not take Imodium. He noticed some mild lower back pain 2/2 from Zarxio injection.  He did take Claritin on days of treatment + PRN advil. Last week was having pain in his gum and was found to have an infected crown. His dentist placed him on Amoxicillin which he completed. No other major adverse events from treatment.  5/7/24 C3D1 FOLFIRI + JENNIFER today 5-FU 2400 mg/m2; Irinotecan 150 mg/m2; Jennifer 5 mg/kg.. [place] ED visit: On D#5 of his cycle patient reports that he was outside gardening when he had lower back pain went into house to take a warm bath.  Patient reports that when he went to go get a warm bath felt lightheaded denies passing out.  No closed head injury.  No chest pain shortness of breath or back pain at this time.   Patient reports that he has history of low blood pressure after receiving chemo.   He was given IVF x 2 liters and sent home. His SCr was elevated 2/2 to pre-renal (dehydration). He felt better afterwards and was back to baseline. Denies diarrhea now.  Generally admits to diarrhea x one week after chemotherapy and admits to 1-3 episodes on those days. Will repeat CBC with diff during NILAM period next week at local [facility].  6/4/24 C5D1 FOLFIRI + JENNIFER today 5-FU 2400 mg/m2; Irinotecan 150 mg/m2; Jennifer 5 mg/kg CT after C#4 on 5/28 revealed response from therapy. Bilateral pulmonary metastases, mildly decreased in size. Soft tissue along the left side of the celiac axis does not appear significantly changed. Extension inferiorly versus adjacent para-aortic lymph node with calcification has minimally decreased. Right hemicolectomy with postoperative changes. He is doing relatively well on chemotherapy. No major adverse reaction. Denies diarrhea. Fatigue noted on days 4-8 of his cycle.  Abates afterwards. Continues to work.  No issues. Appetite is better.  7/3/24 C7 FOLFIRI + JENNIFER 5-FU 2400 mg/m2; Irinotecan 150 mg/m2; Jennifer 5 mg/kg Premeds changed due to constipation. He will receive Zofran instead of Aloxi on days 1-3. Will use Dex as well. Doing well. No major complaints. Continues to work. Labs reviewed. CT scheduled for 7/23 7/16/24 C8 FOLFIRI + JENNIFER 5-FU 2400 mg/m2; Irinotecan 150 mg/m2; Jennifer 5 mg/kg Patient admits to intermittent episodes of syncope in the past which has subsided. No recent episodes. CEA is trending downwards. All other labs WNL. CT scheduled for 7/23/24 His BP has been on the lower end and is currently on HCTZ + Lisinopril Patient plans on travelling during the week of his 13th cycle.   7/30/24 C9 FOLFIRI + JENNIFER 5-FU 2400 mg/m2; Irinotecan 150 mg/m2; Jennifer 5 mg/kg CT on 7/23/24 revealed stable disease. Both CEA and CA 19-9 are trending downwards. He stopped taking HCTZ due to hypotension. His systolic BP < 110s. His constipation has improved. He took stool softeners on days of treatment. Not using Dex on days 2+3. He continues to work. Plan on traveling in a few weeks.  8/27 C11 FOLFIRI + JENNIFER 5-FU 2400 mg/m2; Irinotecan 150 mg/m2; Jennifer 5 mg/kg His BMs remain stable and is having 1-2 loose BMs/day which has not increased. Since induction of treatment no acute changes noted. C12 will be delayed x one week due to travel.  9/17 C12 FOLFIRI + JENNIFER. 5-FU 2400 mg/m2; Irinotecan 150 mg/m2; Jennifer 5 mg/kg Treatment delayed x one week due to travel. No issues in [place]. Patient is feeling well since treatment was delayed x one week, Admits to lower back pain which is chronic and believes it might be related to walking on the cruise ship. He has a history of OA of the lower back and used Advil 200 mg x 2 dose with relief. On MRI L spine OA was noted on his L spine. Labs on 9/16 revealed no acute problems. CT scheduled for 9/26/24   [de-identified] : PCP: Evaristo Lizama

## 2024-09-17 NOTE — ASSESSMENT
[With Patient/Caregiver] : With Patient/Caregiver [Designated Health Care Proxy] : Designated Health Care Proxy [Relationship: ___] : Relationship: [unfilled] [FreeTextEntry1] : COLON ADENOCARCINOMA Patient underwent right hemicolectomy on January, 27, 2021 with pathology consistent with poorly differentiated adenocarcinoma measuring 2.5 cm, infiltrating into pericolonic tissue. There was lymphovascular invasion, with 0 of 14 lymph nodes involved with cancer ([id number], high-risk stage II). Proficient mismatch repair. He received adjuvant capecitabine x 6 months 3/4/21-end of August 2021.  Genetic testing MyRisk = negative.  CAT scans 8/2023 showed portal nodes, and IR biospy on 8/22/22 is positive for relapsed metastatic adenocarcinoma. F1CDx pan-LEONARD wild type, TMB = 4, TPS = 0%.  XELOX + bevacizumab started 2/7/2023. oxaliplatin 130 mg/m2 + bevacizumab 10 mg/kg + capecitabine q3 week cycles. He has 8 cycles, the last of which was 7/5/2023. He was then on maintenance therapy with Avastin (last dose 2/27/24) + Xeloda (last 2/29/24 AM)  We re-reviewed the "Randomized Phase 2 Study of DKN-01 Plus FOLFIRI/FOLFOX and Bevacizumab Versus FOLFIRI/FOLFOX and Bevacizumab as Second-line Treatment of Advanced Colorectal Cancer (DeFianCe)" He and his wife agreed to move forward and a consent process was performed. I have ordered MRI head and repeat imaging, as well as repeat labs.  FOLFIRI + Bevacizumab: C1D1 FOLFIRI + Bevacizumab 4/1/24 5-FU 2400 mg/m2; 5-FU  mg/m2; Irinotecan 180 mg/m2 + Bevacizumab 5 mg/kg C2D1 04/23/2024 C6D1 FOLFIRI + KORIN 6/18/24 C7 7/3/24 C8 7/16/24 C9 7/30 C10 8/13 C11 8/27 C12 9/17 5-FU 2400 mg/m2; Irinotecan 150 mg/m2; Korin 5 mg/kg..  CEA has been stable. Signatera is still pending. I contacted rep to find out why result is delayed. Since tissue is too old, I will cancel it for now and will get new tissue in the future if needed.  Can consider future EGFRi with hyperselection criteria given the right sided (transverse) origin of the colon cancer. Patient is wild type for NRAS KRAS and BRAF. Check liquid DNA prior to proceeding.  Could also use Lonsurf + bevacizumab as future treatment.  C12 FOLFIRI + KORIN today. Treatment delayed x one week due to travel. No issues in [place].  Admits to lower back pain which is chronic and believes it might be related to walking on the cruise ship. He has a history of OA of the lower back and used Advil 200 mg x 2 dose with relief. On MRI L spine OA was noted on his L spine.  Labs on 9/16 revealed no acute problems.  He is having issues with his right wisdom tooth and needs it to be extracted as per his dentist. No infection or pain noted. He was advised to hold off on procedure while on Korin. If needed will need to hold Korin at-least one cycle due to bleeding risk.   CT scheduled for 9/26/24  F/U as per research.  Dr. Chavez agrees with above plan.    [AdvancecareDate] : 07/03/24

## 2024-09-17 NOTE — PHYSICAL EXAM
[Fully active, able to carry on all pre-disease performance without restriction] : Status 0 - Fully active, able to carry on all pre-disease performance without restriction [Normal] : affect appropriate [de-identified] : anicteric [de-identified] : 3x3 non blanching macule on left elbow, PPE grade 2 on hands  [de-identified] : right chest wall mediport c/d/i

## 2024-09-18 DIAGNOSIS — Z51.11 ENCOUNTER FOR ANTINEOPLASTIC CHEMOTHERAPY: ICD-10-CM

## 2024-09-18 DIAGNOSIS — R11.2 NAUSEA WITH VOMITING, UNSPECIFIED: ICD-10-CM

## 2024-09-19 ENCOUNTER — APPOINTMENT (OUTPATIENT)
Dept: INFUSION THERAPY | Facility: HOSPITAL | Age: 59
End: 2024-09-19

## 2024-09-19 ENCOUNTER — APPOINTMENT (OUTPATIENT)
Dept: INFUSION THERAPY | Facility: CLINIC | Age: 59
End: 2024-09-19

## 2024-09-25 ENCOUNTER — NON-APPOINTMENT (OUTPATIENT)
Age: 59
End: 2024-09-25

## 2024-09-25 ENCOUNTER — RESULT REVIEW (OUTPATIENT)
Age: 59
End: 2024-09-25

## 2024-09-26 ENCOUNTER — OUTPATIENT (OUTPATIENT)
Dept: OUTPATIENT SERVICES | Facility: HOSPITAL | Age: 59
LOS: 1 days | End: 2024-09-26
Payer: COMMERCIAL

## 2024-09-26 ENCOUNTER — APPOINTMENT (OUTPATIENT)
Dept: CT IMAGING | Facility: CLINIC | Age: 59
End: 2024-09-26
Payer: COMMERCIAL

## 2024-09-26 DIAGNOSIS — Z90.49 ACQUIRED ABSENCE OF OTHER SPECIFIED PARTS OF DIGESTIVE TRACT: Chronic | ICD-10-CM

## 2024-09-26 DIAGNOSIS — Z98.890 OTHER SPECIFIED POSTPROCEDURAL STATES: Chronic | ICD-10-CM

## 2024-09-26 DIAGNOSIS — C18.9 MALIGNANT NEOPLASM OF COLON, UNSPECIFIED: ICD-10-CM

## 2024-09-26 PROCEDURE — 74177 CT ABD & PELVIS W/CONTRAST: CPT

## 2024-09-26 PROCEDURE — 74177 CT ABD & PELVIS W/CONTRAST: CPT | Mod: 26

## 2024-09-26 PROCEDURE — 71260 CT THORAX DX C+: CPT

## 2024-09-26 PROCEDURE — 71260 CT THORAX DX C+: CPT | Mod: 26

## 2024-09-28 LAB
ALBUMIN SERPL ELPH-MCNC: 3.9 G/DL
ALP BLD-CCNC: 75 U/L
ALT SERPL-CCNC: 41 U/L
ANION GAP SERPL CALC-SCNC: 11 MMOL/L
AST SERPL-CCNC: 31 U/L
BASOPHILS # BLD AUTO: 0.03 K/UL
BASOPHILS NFR BLD AUTO: 0.8 %
BILIRUB SERPL-MCNC: 0.6 MG/DL
BUN SERPL-MCNC: 7 MG/DL
CALCIUM SERPL-MCNC: 9.4 MG/DL
CEA SERPL-MCNC: 39.1 NG/ML
CHLORIDE SERPL-SCNC: 104 MMOL/L
CO2 SERPL-SCNC: 27 MMOL/L
CREAT SERPL-MCNC: 0.62 MG/DL
EGFR: 111 ML/MIN/1.73M2
EOSINOPHIL # BLD AUTO: 0.1 K/UL
EOSINOPHIL NFR BLD AUTO: 2.7 %
GLUCOSE SERPL-MCNC: 135 MG/DL
HCT VFR BLD CALC: 43.3 %
HGB BLD-MCNC: 14.1 G/DL
IMM GRANULOCYTES NFR BLD AUTO: 0 %
LYMPHOCYTES # BLD AUTO: 1.34 K/UL
LYMPHOCYTES NFR BLD AUTO: 36.2 %
MAN DIFF?: NORMAL
MCHC RBC-ENTMCNC: 30.9 PG
MCHC RBC-ENTMCNC: 32.6 GM/DL
MCV RBC AUTO: 94.7 FL
MONOCYTES # BLD AUTO: 0.36 K/UL
MONOCYTES NFR BLD AUTO: 9.7 %
NEUTROPHILS # BLD AUTO: 1.87 K/UL
NEUTROPHILS NFR BLD AUTO: 50.6 %
PLATELET # BLD AUTO: 107 K/UL
POTASSIUM SERPL-SCNC: 4.4 MMOL/L
PROT SERPL-MCNC: 6 G/DL
RBC # BLD: 4.57 M/UL
RBC # FLD: 14.6 %
SODIUM SERPL-SCNC: 142 MMOL/L
WBC # FLD AUTO: 3.7 K/UL

## 2024-10-01 ENCOUNTER — NON-APPOINTMENT (OUTPATIENT)
Age: 59
End: 2024-10-01

## 2024-10-01 ENCOUNTER — APPOINTMENT (OUTPATIENT)
Dept: HEMATOLOGY ONCOLOGY | Facility: CLINIC | Age: 59
End: 2024-10-01
Payer: COMMERCIAL

## 2024-10-01 ENCOUNTER — APPOINTMENT (OUTPATIENT)
Dept: INFUSION THERAPY | Facility: HOSPITAL | Age: 59
End: 2024-10-01

## 2024-10-01 PROCEDURE — G2211 COMPLEX E/M VISIT ADD ON: CPT | Mod: NC

## 2024-10-01 PROCEDURE — 99215 OFFICE O/P EST HI 40 MIN: CPT

## 2024-10-02 ENCOUNTER — NON-APPOINTMENT (OUTPATIENT)
Age: 59
End: 2024-10-02

## 2024-10-02 NOTE — HISTORY OF PRESENT ILLNESS
[de-identified] : Mr. Mccarthy was found to have mild iron deficiency anemia of routine blood work at the end of 2020. Colonoscopy revealed a large tumor in the proximal transverse colon. He had no GI symptoms. A CT of the chest, abdomen and pelvis in January, 2021 revealed no evidence of metastatic disease.  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 There was lymphovascular invasion, with 0 of 14 lymph nodes involved with cancer ([id number], high-risk stage II). Proficient mismatch repair.  He received adjuvant capecitabine x 6 months 3/4/21-end of August 2021 with Dr. Lula Sumner, with minimal toxicity. His CEA was persistently elevated near 7, with multiple negative scans.  ctDNA testing in January, 2022 was unrevealing.  In August, 2022 CT scans showed several enlarging paraaortic lymph nodes (reference node 2.3 x 1.1cm, previously 1.4 x 0.5cm) and two (bilateral) subcentimeter lung nodules. LYMPH NODE BIOPSY AND CYTOLOGIC PREPARATION (ABDOMINAL AORTOCAVAL LYMPH NODE): - METASTATIC ADENOCARCINOMA WITH NECROSIS; MORPHOLOGICALLY CONSISTENT WITH PATIENT'S PREVIOUSLY DIAGNOSED COLONIC ADENOCARCINOMA.  Mr. Mccarthy has been back to Dr. Sumner, was offered FOLFOX + bevacizumab. He has also seen doctors from [facility], who offered him watchful waiting versus irinotecan. His friend, Norbert Borjas is my patient, and suggested he see me as third opinion. Found to have stable aortocaval lymph nodes previously noted on the PET scan on CT imaging at the end of October, 2022.  An IR-guided biopsy of the aortocaval node shortly thereafter confirmed metastatic adenocarcinoma morphologically equivalent to his known colon primary. CT scan completed January, 2023 showed increase in size and number of lung metastases. New liver lesions suspicious for metastatic disease. Increase in size of a periceliac lymph node. Other retroperitoneal lymph nodes are stable.  Proceeded to XELOX + bevacizumab.  2/20/24 CAT Scan CAP COMPARISON: CT chest/abdomen/pelvis 10/31/2023 and 7/24/2023. LUNGS AND LARGE AIRWAYS: Patent central airways. Bilateral pulmonary nodules, many of which are cavitary, increased in size compared to prior. Reference: Right upper lobe nodule (3-49), 1.6 x 1.4 cm, previously 1.3 x 1.2 cm. Right upper lobe nodule (3-43), 1.5 cm, previously 1.1 cm, with increased cavitation. Left lower lobe nodule (3-24), 1.1 cm, previously 0.9 cm. PLEURA: No pleural effusion. VESSELS: Atherosclerotic changes of the coronary arteries. HEART: Heart size is normal. No pericardial effusion. MEDIASTINUM AND SONIA: Prominent right hilar lymph node (2-65), 1.1 x 1.1 cm, previously 1.4 x 1.1 cm. CHEST WALL AND LOWER NECK: Right anterior chest wall infusion port with tip in the SVC. LIVER: Caudate lobe hypoattenuating lesion (3-135), 1.5 x 1.2 cm, previously 1.6 x 1.1 cm. BILE DUCTS: Normal caliber. GALLBLADDER: Cholecystectomy. SPLEEN: Within normal limits. PANCREAS: Within normal limits. ADRENALS: Within normal limits. KIDNEYS/URETERS: Bilateral symmetric enhancement. No hydronephrosis. Punctate nonobstructing left renal interpole calculus. BLADDER: Minimally distended. REPRODUCTIVE ORGANS: Prostate within normal limits. BOWEL: Small hiatal hernia. Right hemicolectomy. No bowel obstruction. PERITONEUM: No ascites. Cystic lesion adjacent to the 4th portion of the duodenum (3-181), 1.9 x 1.5 cm, may represent lymphangioma or duplication cyst. VESSELS: Atherosclerotic changes. Left hepatic artery arising from the left gastric artery, anatomic variant. RETROPERITONEUM/LYMPH NODES: Increased retroperitoneal soft tissue with encasement of the the celiac axis, proximal common hepatic and left gastric arteries.  Partially calcified left para-aortic node (3-146), 2.1 x 1 cm, stable. ABDOMINAL WALL: Postsurgical changes. Tiny fat-containing umbilical hernia. BONES: Degenerative changes. IMPRESSION: Bilateral pulmonary nodules, many of which are cavitary, increased in size compared to prior 10/31/2023. Increased retroperitoneal soft tissue with encasement of the celiac axis, proximal common hepatic and left gastric arteries. Stable hepatic lesion.  Review of labs from 2/27/2024: CEA = 57.6, T bili = 2.8, Gluc = 221. Xeloda 8 pills 2 weeks on / 1 week off; started 11/16/2023 and last dose 2/29/24 AM. Avastin q 3 weeks, last dose = 2/27/24.  3/20/2024 Patient returned from vacation. Feels well and would like to move forward with treatment. We re-reviewed the "Randomized Phase 2 Study of DKN-01 Plus FOLFIRI/FOLFOX and Bevacizumab Versus FOLFIRI/FOLFOX and Bevacizumab as Second-line Treatment of Advanced Colorectal Cancer (DeFianCe)" He and his wife agreed to move forward and a consent process was performed. I have ordered MRI head and repeat imaging, as well as repeat labs. His Total bilirubin was elevated prior visit, and is now improving. There is evidence of Gilbert's disease in terms of heterozygous [id number]  Repeat bilirubin today decreased to 2.4 (was 3.4) Will review with sponsor about the bilirubin and if this level acceptable for eligibility.  4/9/2024 C1D1 FOLFIRI + Bevacizumab 4/1/24 5-FU 2400 mg/m2; 5-FU  mg/m2; Irinotecan 180 mg/m2 + Bevacizumab 5 mg/kg Patient has had FOLFOX + Jennifer in the past. We re-reviewed adverse events. He feels that his tolerance was pretty good. He notes that when is off of dexamethasone on day 4, that fatigue is increased. Stool - loose stool up to 3 times and did not need immodium. Hiccoughs - on day 2 and 3 of treatment, and will look into use of Emend since not on DKN-01  4/16/2024 C1D1 FOLFIRI + Bevacizumab 4/1/24 5-FU 2400 mg/m2; 5-FU  mg/m2; Irinotecan 180 mg/m2 + Bevacizumab 5 mg/kg Today is C2D1. Will be delayed 1 week due to ANC < 1000. Will change treatment dosing: Stop 5FU IVP, and lower irinotecan to 150. New treatment will be 5-FU 2400 mg/m2,  mg.m2, Irinotecan 150 mg/m2 + Bevacizumab 5 mg/kg. Will give Zarxio 480 mcg daily for 3 days.  04/23 C2D1 FOLFIRI + JENNIFER today 5-FU 2400 mg/m2; Irinotecan 150 mg/m2; Jennifer 5 mg/kg. Doing relatively well today. Admits to diarrhea during one week of his treatment which has subsided. At most had 1-3 bowel movements on those days.  Did not take Imodium. He noticed some mild lower back pain 2/2 from Zarxio injection.  He did take Claritin on days of treatment + PRN advil. Last week was having pain in his gum and was found to have an infected crown. His dentist placed him on Amoxicillin which he completed. No other major adverse events from treatment.  5/7/24 C3D1 FOLFIRI + JENNIFER today 5-FU 2400 mg/m2; Irinotecan 150 mg/m2; Jennifer 5 mg/kg.. [facility] visit: On D#5 of his cycle patient reports that he was outside gardening when he had lower back pain went into house to take a warm bath.  Patient reports that when he went to go get a warm bath felt lightheaded denies passing out.  No closed head injury.  No chest pain shortness of breath or back pain at this time.   Patient reports that he has history of low blood pressure after receiving chemo.   He was given IVF x 2 liters and sent home. His SCr was elevated 2/2 to pre-renal (dehydration). He felt better afterwards and was back to baseline. Denies diarrhea now.  Generally admits to diarrhea x one week after chemotherapy and admits to 1-3 episodes on those days. Will repeat CBC with diff during NILAM period next week at local [facility].  6/4/24 C5D1 FOLFIRI + JENNIFER today 5-FU 2400 mg/m2; Irinotecan 150 mg/m2; Jennifer 5 mg/kg CT after C#4 on 5/28 revealed response from therapy. Bilateral pulmonary metastases, mildly decreased in size. Soft tissue along the left side of the celiac axis does not appear significantly changed. Extension inferiorly versus adjacent para-aortic lymph node with calcification has minimally decreased. Right hemicolectomy with postoperative changes. He is doing relatively well on chemotherapy. No major adverse reaction. Denies diarrhea. Fatigue noted on days 4-8 of his cycle.  Abates afterwards. Continues to work.  No issues. Appetite is better.  7/3/24 C7 FOLFIRI + JENNIFER 5-FU 2400 mg/m2; Irinotecan 150 mg/m2; Jennifer 5 mg/kg Premeds changed due to constipation. He will receive Zofran instead of Aloxi on days 1-3. Will use Dex as well. Doing well. No major complaints. Continues to work. Labs reviewed. CT scheduled for 7/23 7/16/24 C8 FOLFIRI + JENNIFER 5-FU 2400 mg/m2; Irinotecan 150 mg/m2; Jennifer 5 mg/kg Patient admits to intermittent episodes of syncope in the past which has subsided. No recent episodes. CEA is trending downwards. All other labs WNL. CT scheduled for 7/23/24 His BP has been on the lower end and is currently on HCTZ + Lisinopril Patient plans on travelling during the week of his 13th cycle.   7/30/24 C9 FOLFIRI + JENNIFER 5-FU 2400 mg/m2; Irinotecan 150 mg/m2; Jennifer 5 mg/kg CT on 7/23/24 revealed stable disease. Both CEA and CA 19-9 are trending downwards. He stopped taking HCTZ due to hypotension. His systolic BP < 110s. His constipation has improved. He took stool softeners on days of treatment. Not using Dex on days 2+3. He continues to work. Plan on traveling in a few weeks.  8/27 C11 FOLFIRI + JENNIFER 5-FU 2400 mg/m2; Irinotecan 150 mg/m2; Jennifer 5 mg/kg His BMs remain stable and is having 1-2 loose BMs/day which has not increased. Since induction of treatment no acute changes noted. C12 will be delayed x one week due to travel.  9/17 C12 FOLFIRI + JENNIFER. 5-FU 2400 mg/m2; Irinotecan 150 mg/m2; Jennifer 5 mg/kg Treatment delayed x one week due to travel. No issues in [place]. Patient is feeling well since treatment was delayed x one week, Admits to lower back pain which is chronic and believes it might be related to walking on the cruise ship. He has a history of OA of the lower back and used Advil 200 mg x 2 dose with relief. On MRI L spine OA was noted on his L spine. Labs on 9/16 revealed no acute problems. CT scheduled for 9/26/24  10/1/24 Patient notes mid back pain for a few weeks. Started after sitting on a stool without backing. Bothers him at night when lying down. There is stiffness of right paraspinal muscle. Will try heat and stretching and increase walking. He may use Advil 600 mg PRN for now. Here for C#13 FOLFIRI + Jennifer. He is cleared for treatment today.  [de-identified] : PCP: Evaristo Lizama

## 2024-10-02 NOTE — PHYSICAL EXAM
[Fully active, able to carry on all pre-disease performance without restriction] : Status 0 - Fully active, able to carry on all pre-disease performance without restriction [Normal] : affect appropriate [de-identified] : anicteric [de-identified] : right chest wall mediport c/d/i [de-identified] : 3x3 non blanching macule on left elbow, PPE grade 2 on hands

## 2024-10-02 NOTE — ASSESSMENT
[With Patient/Caregiver] : With Patient/Caregiver [Designated Health Care Proxy] : Designated Health Care Proxy [Relationship: ___] : Relationship: [unfilled] [FreeTextEntry1] : COLON ADENOCARCINOMA Patient underwent right hemicolectomy on January, 27, 2021 with pathology consistent with poorly differentiated adenocarcinoma measuring 2.5 cm, infiltrating into pericolonic tissue. There was lymphovascular invasion, with 0 of 14 lymph nodes involved with cancer ([id number], high-risk stage II). Proficient mismatch repair. He received adjuvant capecitabine x 6 months 3/4/21-end of August 2021.  Genetic testing MyRisk = negative.  CAT scan showed portal nodes, and IR biospy on 8/22/2022 is positive for relapsed metastatic adenocarcinoma.  F1CDx showed: KRAS, NRAS, BRAF wild-type; MS-stable; TMB = 4; and PDL1 TPS = 0%  XELOX + bevacizumab started 2/7/2023. oxaliplatin 130 mg/m2 + bevacizumab 10 mg/kg + capecitabine q3 week cycles. He has 8 cycles, the last of which was 7/5/2023. He was then on maintenance therapy with Avastin (last dose 2/27/24) + Xeloda (last 2/29/24 AM)  We re-reviewed the "Randomized Phase 2 Study of DKN-01 Plus FOLFIRI/FOLFOX and Bevacizumab Versus FOLFIRI/FOLFOX and Bevacizumab as Second-line Treatment of Advanced Colorectal Cancer (DeFianCe)" He and his wife agreed to move forward and a consent process was performed. I have ordered MRI head and repeat imaging, as well as repeat labs.  FOLFIRI + Bevacizumab: C1D1 FOLFIRI + Bevacizumab 4/1/24 5-FU 2400 mg/m2; 5-FU  mg/m2; Irinotecan 180 mg/m2 + Bevacizumab 5 mg/kg C2D1 04/23/2024 C6D1 FOLFIRI + KORIN 6/18/24 C12 9/17 5-FU 2400 mg/m2; Irinotecan 150 mg/m2; Korin 5 mg/kg.. 10/1/24: Here for C#13 FOLFIRI + Korin. He is cleared for treatment today.  Can consider future EGFRi with hyperselection criteria given the right sided (transverse) origin of the colon cancer. Patient is wild type for NRAS KRAS and BRAF. Check liquid DNA prior to proceeding.  Could also use Lonsurf + bevacizumab as future treatment.  Will review at [facility] and discuss liver directed therapy and then   MIDBACK PAIN Patient notes mid back pain for a few weeks. Started after sitting on a stool without backing. Bothers him at night when lying down. There is stiffness of right paraspinal muscle. Will try heat and stretching and increase walking. He may use Advil 600 mg PRN for now.  DENTAL  He is having issues with his right wisdom tooth and needs it to be extracted as per his dentist. No infection or pain noted. If needed will consider to hold Korin at-least one cycle due to bleeding risk.  [AdvancecareDate] : 07/03/24

## 2024-10-02 NOTE — ASSESSMENT
[With Patient/Caregiver] : With Patient/Caregiver [Designated Health Care Proxy] : Designated Health Care Proxy [Relationship: ___] : Relationship: [unfilled] [FreeTextEntry1] : COLON ADENOCARCINOMA Patient underwent right hemicolectomy on January, 27, 2021 with pathology consistent with poorly differentiated adenocarcinoma measuring 2.5 cm, infiltrating into pericolonic tissue. There was lymphovascular invasion, with 0 of 14 lymph nodes involved with cancer ([id number], high-risk stage II). Proficient mismatch repair. He received adjuvant capecitabine x 6 months 3/4/21-end of August 2021.  Genetic testing MyRisk = negative.  CAT scan showed portal nodes, and IR biospy on 8/22/2022 is positive for relapsed metastatic adenocarcinoma.  F1CDx showed: KRAS, NRAS, BRAF wild-type; MS-stable; TMB = 4; and PDL1 TPS = 0%  XELOX + bevacizumab started 2/7/2023. oxaliplatin 130 mg/m2 + bevacizumab 10 mg/kg + capecitabine q3 week cycles. He has 8 cycles, the last of which was 7/5/2023. He was then on maintenance therapy with Avastin (last dose 2/27/24) + Xeloda (last 2/29/24 AM)  We re-reviewed the "Randomized Phase 2 Study of DKN-01 Plus FOLFIRI/FOLFOX and Bevacizumab Versus FOLFIRI/FOLFOX and Bevacizumab as Second-line Treatment of Advanced Colorectal Cancer (DeFianCe)" He and his wife agreed to move forward and a consent process was performed. I have ordered MRI head and repeat imaging, as well as repeat labs.  FOLFIRI + Bevacizumab: C1D1 FOLFIRI + Bevacizumab 4/1/24 5-FU 2400 mg/m2; 5-FU  mg/m2; Irinotecan 180 mg/m2 + Bevacizumab 5 mg/kg C2D1 04/23/2024 C6D1 FOLFIRI + KORIN 6/18/24 C12 9/17 5-FU 2400 mg/m2; Irinotecan 150 mg/m2; Korin 5 mg/kg.. 10/1/24: Here for C#13 FOLFIRI + Korin. He is cleared for treatment today.  Can consider future EGFRi with hyperselection criteria given the right sided (transverse) origin of the colon cancer. Patient is wild type for NRAS KRAS and BRAF. Check liquid DNA prior to proceeding.  Could also use Lonsurf + bevacizumab as future treatment.  Will review at GI [facility] and discuss liver directed therapy and then   MIDBACK PAIN Patient notes mid back pain for a few weeks. Started after sitting on a stool without backing. Bothers him at night when lying down. There is stiffness of right paraspinal muscle. Will try heat and stretching and increase walking. He may use Advil 600 mg PRN for now.  DENTAL  He is having issues with his right wisdom tooth and needs it to be extracted as per his dentist. No infection or pain noted. If needed will consider to hold Korin at-least one cycle due to bleeding risk.  [AdvancecareDate] : 07/03/24

## 2024-10-02 NOTE — HISTORY OF PRESENT ILLNESS
[de-identified] : Mr. Mccarthy was found to have mild iron deficiency anemia of routine blood work at the end of 2020. Colonoscopy revealed a large tumor in the proximal transverse colon. He had no GI symptoms. A CT of the chest, abdomen and pelvis in January, 2021 revealed no evidence of metastatic disease.  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 There was lymphovascular invasion, with 0 of 14 lymph nodes involved with cancer ([id number], high-risk stage II). Proficient mismatch repair.  He received adjuvant capecitabine x 6 months 3/4/21-end of August 2021 with Dr. Lula Sumner, with minimal toxicity. His CEA was persistently elevated near 7, with multiple negative scans.  ctDNA testing in January, 2022 was unrevealing.  In August, 2022 CT scans showed several enlarging paraaortic lymph nodes (reference node 2.3 x 1.1cm, previously 1.4 x 0.5cm) and two (bilateral) subcentimeter lung nodules. LYMPH NODE BIOPSY AND CYTOLOGIC PREPARATION (ABDOMINAL AORTOCAVAL LYMPH NODE): - METASTATIC ADENOCARCINOMA WITH NECROSIS; MORPHOLOGICALLY CONSISTENT WITH PATIENT'S PREVIOUSLY DIAGNOSED COLONIC ADENOCARCINOMA.  Mr. Mccarthy has been back to Dr. Sumner, was offered FOLFOX + bevacizumab. He has also seen doctors from [facility] [place], who offered him watchful waiting versus irinotecan. His friend, Norbert Borjas is my patient, and suggested he see me as third opinion. Found to have stable aortocaval lymph nodes previously noted on the PET scan on CT imaging at the end of October, 2022.  An IR-guided biopsy of the aortocaval node shortly thereafter confirmed metastatic adenocarcinoma morphologically equivalent to his known colon primary. CT scan completed January, 2023 showed increase in size and number of lung metastases. New liver lesions suspicious for metastatic disease. Increase in size of a periceliac lymph node. Other retroperitoneal lymph nodes are stable.  Proceeded to XELOX + bevacizumab.  2/20/24 CAT Scan CAP COMPARISON: CT chest/abdomen/pelvis 10/31/2023 and 7/24/2023. LUNGS AND LARGE AIRWAYS: Patent central airways. Bilateral pulmonary nodules, many of which are cavitary, increased in size compared to prior. Reference: Right upper lobe nodule (3-49), 1.6 x 1.4 cm, previously 1.3 x 1.2 cm. Right upper lobe nodule (3-43), 1.5 cm, previously 1.1 cm, with increased cavitation. Left lower lobe nodule (3-24), 1.1 cm, previously 0.9 cm. PLEURA: No pleural effusion. VESSELS: Atherosclerotic changes of the coronary arteries. HEART: Heart size is normal. No pericardial effusion. MEDIASTINUM AND SONIA: Prominent right hilar lymph node (2-65), 1.1 x 1.1 cm, previously 1.4 x 1.1 cm. CHEST WALL AND LOWER NECK: Right anterior chest wall infusion port with tip in the SVC. LIVER: Caudate lobe hypoattenuating lesion (3-135), 1.5 x 1.2 cm, previously 1.6 x 1.1 cm. BILE DUCTS: Normal caliber. GALLBLADDER: Cholecystectomy. SPLEEN: Within normal limits. PANCREAS: Within normal limits. ADRENALS: Within normal limits. KIDNEYS/URETERS: Bilateral symmetric enhancement. No hydronephrosis. Punctate nonobstructing left renal interpole calculus. BLADDER: Minimally distended. REPRODUCTIVE ORGANS: Prostate within normal limits. BOWEL: Small hiatal hernia. Right hemicolectomy. No bowel obstruction. PERITONEUM: No ascites. Cystic lesion adjacent to the 4th portion of the duodenum (3-181), 1.9 x 1.5 cm, may represent lymphangioma or duplication cyst. VESSELS: Atherosclerotic changes. Left hepatic artery arising from the left gastric artery, anatomic variant. RETROPERITONEUM/LYMPH NODES: Increased retroperitoneal soft tissue with encasement of the the celiac axis, proximal common hepatic and left gastric arteries.  Partially calcified left para-aortic node (3-146), 2.1 x 1 cm, stable. ABDOMINAL WALL: Postsurgical changes. Tiny fat-containing umbilical hernia. BONES: Degenerative changes. IMPRESSION: Bilateral pulmonary nodules, many of which are cavitary, increased in size compared to prior 10/31/2023. Increased retroperitoneal soft tissue with encasement of the celiac axis, proximal common hepatic and left gastric arteries. Stable hepatic lesion.  Review of labs from 2/27/2024: CEA = 57.6, T bili = 2.8, Gluc = 221. Xeloda 8 pills 2 weeks on / 1 week off; started 11/16/2023 and last dose 2/29/24 AM. Avastin q 3 weeks, last dose = 2/27/24.  3/20/2024 Patient returned from vacation. Feels well and would like to move forward with treatment. We re-reviewed the "Randomized Phase 2 Study of DKN-01 Plus FOLFIRI/FOLFOX and Bevacizumab Versus FOLFIRI/FOLFOX and Bevacizumab as Second-line Treatment of Advanced Colorectal Cancer (DeFianCe)" He and his wife agreed to move forward and a consent process was performed. I have ordered MRI head and repeat imaging, as well as repeat labs. His Total bilirubin was elevated prior visit, and is now improving. There is evidence of Gilbert's disease in terms of heterozygous [id number]  Repeat bilirubin today decreased to 2.4 (was 3.4) Will review with sponsor about the bilirubin and if this level acceptable for eligibility.  4/9/2024 C1D1 FOLFIRI + Bevacizumab 4/1/24 5-FU 2400 mg/m2; 5-FU  mg/m2; Irinotecan 180 mg/m2 + Bevacizumab 5 mg/kg Patient has had FOLFOX + Jennifer in the past. We re-reviewed adverse events. He feels that his tolerance was pretty good. He notes that when is off of dexamethasone on day 4, that fatigue is increased. Stool - loose stool up to 3 times and did not need immodium. Hiccoughs - on day 2 and 3 of treatment, and will look into use of Emend since not on DKN-01  4/16/2024 C1D1 FOLFIRI + Bevacizumab 4/1/24 5-FU 2400 mg/m2; 5-FU  mg/m2; Irinotecan 180 mg/m2 + Bevacizumab 5 mg/kg Today is C2D1. Will be delayed 1 week due to ANC < 1000. Will change treatment dosing: Stop 5FU IVP, and lower irinotecan to 150. New treatment will be 5-FU 2400 mg/m2,  mg.m2, Irinotecan 150 mg/m2 + Bevacizumab 5 mg/kg. Will give Zarxio 480 mcg daily for 3 days.  04/23 C2D1 FOLFIRI + JENNIFER today 5-FU 2400 mg/m2; Irinotecan 150 mg/m2; Jennifer 5 mg/kg. Doing relatively well today. Admits to diarrhea during one week of his treatment which has subsided. At most had 1-3 bowel movements on those days.  Did not take Imodium. He noticed some mild lower back pain 2/2 from Zarxio injection.  He did take Claritin on days of treatment + PRN advil. Last week was having pain in his gum and was found to have an infected crown. His dentist placed him on Amoxicillin which he completed. No other major adverse events from treatment.  5/7/24 C3D1 FOLFIRI + JENNIFER today 5-FU 2400 mg/m2; Irinotecan 150 mg/m2; Jennifer 5 mg/kg.. [place] ED visit: On D#5 of his cycle patient reports that he was outside gardening when he had lower back pain went into house to take a warm bath.  Patient reports that when he went to go get a warm bath felt lightheaded denies passing out.  No closed head injury.  No chest pain shortness of breath or back pain at this time.   Patient reports that he has history of low blood pressure after receiving chemo.   He was given IVF x 2 liters and sent home. His SCr was elevated 2/2 to pre-renal (dehydration). He felt better afterwards and was back to baseline. Denies diarrhea now.  Generally admits to diarrhea x one week after chemotherapy and admits to 1-3 episodes on those days. Will repeat CBC with diff during NILAM period next week at local [facility].  6/4/24 C5D1 FOLFIRI + JENNIFER today 5-FU 2400 mg/m2; Irinotecan 150 mg/m2; Jennifer 5 mg/kg CT after C#4 on 5/28 revealed response from therapy. Bilateral pulmonary metastases, mildly decreased in size. Soft tissue along the left side of the celiac axis does not appear significantly changed. Extension inferiorly versus adjacent para-aortic lymph node with calcification has minimally decreased. Right hemicolectomy with postoperative changes. He is doing relatively well on chemotherapy. No major adverse reaction. Denies diarrhea. Fatigue noted on days 4-8 of his cycle.  Abates afterwards. Continues to work.  No issues. Appetite is better.  7/3/24 C7 FOLFIRI + JENNIFER 5-FU 2400 mg/m2; Irinotecan 150 mg/m2; Jennifer 5 mg/kg Premeds changed due to constipation. He will receive Zofran instead of Aloxi on days 1-3. Will use Dex as well. Doing well. No major complaints. Continues to work. Labs reviewed. CT scheduled for 7/23 7/16/24 C8 FOLFIRI + JENNIFER 5-FU 2400 mg/m2; Irinotecan 150 mg/m2; Jennifer 5 mg/kg Patient admits to intermittent episodes of syncope in the past which has subsided. No recent episodes. CEA is trending downwards. All other labs WNL. CT scheduled for 7/23/24 His BP has been on the lower end and is currently on HCTZ + Lisinopril Patient plans on travelling during the week of his 13th cycle.   7/30/24 C9 FOLFIRI + JENNIFER 5-FU 2400 mg/m2; Irinotecan 150 mg/m2; Jennifer 5 mg/kg CT on 7/23/24 revealed stable disease. Both CEA and CA 19-9 are trending downwards. He stopped taking HCTZ due to hypotension. His systolic BP < 110s. His constipation has improved. He took stool softeners on days of treatment. Not using Dex on days 2+3. He continues to work. Plan on traveling in a few weeks.  8/27 C11 FOLFIRI + JENNIFER 5-FU 2400 mg/m2; Irinotecan 150 mg/m2; Jennifer 5 mg/kg His BMs remain stable and is having 1-2 loose BMs/day which has not increased. Since induction of treatment no acute changes noted. C12 will be delayed x one week due to travel.  9/17 C12 FOLFIRI + JENNIFER. 5-FU 2400 mg/m2; Irinotecan 150 mg/m2; Jennifer 5 mg/kg Treatment delayed x one week due to travel. No issues in [place]. Patient is feeling well since treatment was delayed x one week, Admits to lower back pain which is chronic and believes it might be related to walking on the cruise ship. He has a history of OA of the lower back and used Advil 200 mg x 2 dose with relief. On MRI L spine OA was noted on his L spine. Labs on 9/16 revealed no acute problems. CT scheduled for 9/26/24  10/1/24 Patient notes mid back pain for a few weeks. Started after sitting on a stool without backing. Bothers him at night when lying down. There is stiffness of right paraspinal muscle. Will try heat and stretching and increase walking. He may use Advil 600 mg PRN for now. Here for C#13 FOLFIRI + Jennifer. He is cleared for treatment today.  [de-identified] : PCP: Evaristo Lizama

## 2024-10-02 NOTE — PHYSICAL EXAM
[Fully active, able to carry on all pre-disease performance without restriction] : Status 0 - Fully active, able to carry on all pre-disease performance without restriction [Normal] : affect appropriate [de-identified] : anicteric [de-identified] : right chest wall mediport c/d/i [de-identified] : 3x3 non blanching macule on left elbow, PPE grade 2 on hands

## 2024-10-03 ENCOUNTER — APPOINTMENT (OUTPATIENT)
Dept: INFUSION THERAPY | Facility: CLINIC | Age: 59
End: 2024-10-03

## 2024-10-03 ENCOUNTER — APPOINTMENT (OUTPATIENT)
Dept: INFUSION THERAPY | Facility: HOSPITAL | Age: 59
End: 2024-10-03

## 2024-10-10 NOTE — ED ADULT NURSE NOTE - NSFALLASSESSNEED_ED_ALL_ED
Improving
Met - goal discontinued
Improving
Improving
No Change
Met - goal discontinued
Improving
no
Improving
No Change
Improving
No Change

## 2024-10-11 ENCOUNTER — NON-APPOINTMENT (OUTPATIENT)
Age: 59
End: 2024-10-11

## 2024-10-13 LAB
ALBUMIN SERPL ELPH-MCNC: 3.7 G/DL
ALP BLD-CCNC: 79 U/L
ALT SERPL-CCNC: 52 U/L
ANION GAP SERPL CALC-SCNC: 13 MMOL/L
AST SERPL-CCNC: 34 U/L
BASOPHILS # BLD AUTO: 0.02 K/UL
BASOPHILS NFR BLD AUTO: 0.6 %
BILIRUB SERPL-MCNC: 0.5 MG/DL
BUN SERPL-MCNC: 11 MG/DL
CALCIUM SERPL-MCNC: 9.1 MG/DL
CHLORIDE SERPL-SCNC: 104 MMOL/L
CO2 SERPL-SCNC: 23 MMOL/L
CREAT SERPL-MCNC: 0.69 MG/DL
EGFR: 108 ML/MIN/1.73M2
EOSINOPHIL # BLD AUTO: 0.13 K/UL
EOSINOPHIL NFR BLD AUTO: 3.6 %
GLUCOSE SERPL-MCNC: 134 MG/DL
HCT VFR BLD CALC: 41.8 %
HGB BLD-MCNC: 13.7 G/DL
IMM GRANULOCYTES NFR BLD AUTO: 0.3 %
LYMPHOCYTES # BLD AUTO: 1.24 K/UL
LYMPHOCYTES NFR BLD AUTO: 34.6 %
MAN DIFF?: NORMAL
MCHC RBC-ENTMCNC: 30.9 PG
MCHC RBC-ENTMCNC: 32.8 GM/DL
MCV RBC AUTO: 94.4 FL
MONOCYTES # BLD AUTO: 0.37 K/UL
MONOCYTES NFR BLD AUTO: 10.3 %
NEUTROPHILS # BLD AUTO: 1.81 K/UL
NEUTROPHILS NFR BLD AUTO: 50.6 %
PLATELET # BLD AUTO: 103 K/UL
POTASSIUM SERPL-SCNC: 4.1 MMOL/L
PROT SERPL-MCNC: 5.6 G/DL
RBC # BLD: 4.43 M/UL
RBC # FLD: 15 %
SODIUM SERPL-SCNC: 140 MMOL/L
WBC # FLD AUTO: 3.58 K/UL

## 2024-10-14 ENCOUNTER — OUTPATIENT (OUTPATIENT)
Dept: OUTPATIENT SERVICES | Facility: HOSPITAL | Age: 59
LOS: 1 days | Discharge: HOME | End: 2024-10-14
Payer: COMMERCIAL

## 2024-10-14 DIAGNOSIS — Z98.890 OTHER SPECIFIED POSTPROCEDURAL STATES: Chronic | ICD-10-CM

## 2024-10-14 DIAGNOSIS — Z90.49 ACQUIRED ABSENCE OF OTHER SPECIFIED PARTS OF DIGESTIVE TRACT: Chronic | ICD-10-CM

## 2024-10-15 ENCOUNTER — APPOINTMENT (OUTPATIENT)
Dept: HEMATOLOGY ONCOLOGY | Facility: CLINIC | Age: 59
End: 2024-10-15
Payer: COMMERCIAL

## 2024-10-15 ENCOUNTER — OUTPATIENT (OUTPATIENT)
Dept: OUTPATIENT SERVICES | Facility: HOSPITAL | Age: 59
LOS: 1 days | Discharge: HOME | End: 2024-10-15
Payer: COMMERCIAL

## 2024-10-15 ENCOUNTER — NON-APPOINTMENT (OUTPATIENT)
Age: 59
End: 2024-10-15

## 2024-10-15 ENCOUNTER — APPOINTMENT (OUTPATIENT)
Dept: INFUSION THERAPY | Facility: HOSPITAL | Age: 59
End: 2024-10-15

## 2024-10-15 VITALS
SYSTOLIC BLOOD PRESSURE: 160 MMHG | HEART RATE: 82 BPM | TEMPERATURE: 97.9 F | RESPIRATION RATE: 15 BRPM | DIASTOLIC BLOOD PRESSURE: 90 MMHG | WEIGHT: 196.21 LBS | BODY MASS INDEX: 29.83 KG/M2 | OXYGEN SATURATION: 97 %

## 2024-10-15 DIAGNOSIS — Z98.890 OTHER SPECIFIED POSTPROCEDURAL STATES: Chronic | ICD-10-CM

## 2024-10-15 DIAGNOSIS — C78.7 MALIGNANT NEOPLASM OF COLON, UNSPECIFIED: ICD-10-CM

## 2024-10-15 DIAGNOSIS — Z90.49 ACQUIRED ABSENCE OF OTHER SPECIFIED PARTS OF DIGESTIVE TRACT: Chronic | ICD-10-CM

## 2024-10-15 DIAGNOSIS — C18.9 MALIGNANT NEOPLASM OF COLON, UNSPECIFIED: ICD-10-CM

## 2024-10-15 PROCEDURE — 99214 OFFICE O/P EST MOD 30 MIN: CPT

## 2024-10-15 PROCEDURE — G2211 COMPLEX E/M VISIT ADD ON: CPT | Mod: NC

## 2024-10-15 PROCEDURE — 93010 ELECTROCARDIOGRAM REPORT: CPT

## 2024-10-15 PROCEDURE — 93005 ELECTROCARDIOGRAM TRACING: CPT

## 2024-10-17 ENCOUNTER — APPOINTMENT (OUTPATIENT)
Dept: INFUSION THERAPY | Facility: CLINIC | Age: 59
End: 2024-10-17

## 2024-10-19 ENCOUNTER — NON-APPOINTMENT (OUTPATIENT)
Age: 59
End: 2024-10-19

## 2024-10-23 ENCOUNTER — APPOINTMENT (OUTPATIENT)
Dept: RADIATION ONCOLOGY | Facility: CLINIC | Age: 59
End: 2024-10-23
Payer: COMMERCIAL

## 2024-10-23 VITALS
DIASTOLIC BLOOD PRESSURE: 77 MMHG | HEART RATE: 81 BPM | SYSTOLIC BLOOD PRESSURE: 134 MMHG | WEIGHT: 196 LBS | RESPIRATION RATE: 16 BRPM | BODY MASS INDEX: 29.7 KG/M2 | HEIGHT: 68 IN | TEMPERATURE: 96.98 F | OXYGEN SATURATION: 98 %

## 2024-10-23 PROCEDURE — 99205 OFFICE O/P NEW HI 60 MIN: CPT

## 2024-10-24 ENCOUNTER — OUTPATIENT (OUTPATIENT)
Dept: OUTPATIENT SERVICES | Facility: HOSPITAL | Age: 59
LOS: 1 days | Discharge: HOME | End: 2024-10-24
Payer: COMMERCIAL

## 2024-10-24 DIAGNOSIS — Z98.890 OTHER SPECIFIED POSTPROCEDURAL STATES: Chronic | ICD-10-CM

## 2024-10-24 DIAGNOSIS — Z90.49 ACQUIRED ABSENCE OF OTHER SPECIFIED PARTS OF DIGESTIVE TRACT: Chronic | ICD-10-CM

## 2024-10-24 DIAGNOSIS — C18.9 MALIGNANT NEOPLASM OF COLON, UNSPECIFIED: ICD-10-CM

## 2024-10-26 ENCOUNTER — LABORATORY RESULT (OUTPATIENT)
Age: 59
End: 2024-10-26

## 2024-10-28 DIAGNOSIS — C18.4 MALIGNANT NEOPLASM OF TRANSVERSE COLON: ICD-10-CM

## 2024-10-29 ENCOUNTER — NON-APPOINTMENT (OUTPATIENT)
Age: 59
End: 2024-10-29

## 2024-10-29 ENCOUNTER — APPOINTMENT (OUTPATIENT)
Dept: INFUSION THERAPY | Facility: HOSPITAL | Age: 59
End: 2024-10-29

## 2024-10-29 ENCOUNTER — APPOINTMENT (OUTPATIENT)
Dept: HEMATOLOGY ONCOLOGY | Facility: CLINIC | Age: 59
End: 2024-10-29
Payer: COMMERCIAL

## 2024-10-29 ENCOUNTER — APPOINTMENT (OUTPATIENT)
Dept: HEMATOLOGY ONCOLOGY | Facility: CLINIC | Age: 59
End: 2024-10-29

## 2024-10-29 DIAGNOSIS — C18.9 MALIGNANT NEOPLASM OF COLON, UNSPECIFIED: ICD-10-CM

## 2024-10-29 DIAGNOSIS — C78.7 MALIGNANT NEOPLASM OF COLON, UNSPECIFIED: ICD-10-CM

## 2024-10-29 PROCEDURE — 99214 OFFICE O/P EST MOD 30 MIN: CPT

## 2024-10-29 PROCEDURE — 93010 ELECTROCARDIOGRAM REPORT: CPT

## 2024-10-29 PROCEDURE — 93005 ELECTROCARDIOGRAM TRACING: CPT

## 2024-10-29 PROCEDURE — G2211 COMPLEX E/M VISIT ADD ON: CPT | Mod: NC

## 2024-10-30 ENCOUNTER — NON-APPOINTMENT (OUTPATIENT)
Age: 59
End: 2024-10-30

## 2024-10-30 DIAGNOSIS — Z51.11 ENCOUNTER FOR ANTINEOPLASTIC CHEMOTHERAPY: ICD-10-CM

## 2024-10-30 DIAGNOSIS — R11.2 NAUSEA WITH VOMITING, UNSPECIFIED: ICD-10-CM

## 2024-10-31 ENCOUNTER — APPOINTMENT (OUTPATIENT)
Dept: INFUSION THERAPY | Facility: CLINIC | Age: 59
End: 2024-10-31

## 2024-11-04 ENCOUNTER — OUTPATIENT (OUTPATIENT)
Dept: OUTPATIENT SERVICES | Facility: HOSPITAL | Age: 59
LOS: 1 days | End: 2024-11-04
Payer: COMMERCIAL

## 2024-11-04 ENCOUNTER — APPOINTMENT (OUTPATIENT)
Dept: MRI IMAGING | Facility: CLINIC | Age: 59
End: 2024-11-04
Payer: COMMERCIAL

## 2024-11-04 DIAGNOSIS — C18.9 MALIGNANT NEOPLASM OF COLON, UNSPECIFIED: ICD-10-CM

## 2024-11-04 DIAGNOSIS — Z90.49 ACQUIRED ABSENCE OF OTHER SPECIFIED PARTS OF DIGESTIVE TRACT: Chronic | ICD-10-CM

## 2024-11-04 DIAGNOSIS — Z98.890 OTHER SPECIFIED POSTPROCEDURAL STATES: Chronic | ICD-10-CM

## 2024-11-04 PROCEDURE — 74183 MRI ABD W/O CNTR FLWD CNTR: CPT | Mod: 26

## 2024-11-04 PROCEDURE — 74183 MRI ABD W/O CNTR FLWD CNTR: CPT

## 2024-11-05 ENCOUNTER — NON-APPOINTMENT (OUTPATIENT)
Age: 59
End: 2024-11-05

## 2024-11-05 PROCEDURE — 77334 RADIATION TREATMENT AID(S): CPT | Mod: 26

## 2024-11-05 PROCEDURE — 77263 THER RADIOLOGY TX PLNG CPLX: CPT

## 2024-11-07 ENCOUNTER — NON-APPOINTMENT (OUTPATIENT)
Age: 59
End: 2024-11-07

## 2024-11-08 ENCOUNTER — NON-APPOINTMENT (OUTPATIENT)
Age: 59
End: 2024-11-08

## 2024-11-12 ENCOUNTER — APPOINTMENT (OUTPATIENT)
Dept: HEMATOLOGY ONCOLOGY | Facility: CLINIC | Age: 59
End: 2024-11-12

## 2024-11-12 ENCOUNTER — NON-APPOINTMENT (OUTPATIENT)
Age: 59
End: 2024-11-12

## 2024-11-12 ENCOUNTER — APPOINTMENT (OUTPATIENT)
Dept: INFUSION THERAPY | Facility: HOSPITAL | Age: 59
End: 2024-11-12

## 2024-11-12 ENCOUNTER — APPOINTMENT (OUTPATIENT)
Dept: HEMATOLOGY ONCOLOGY | Facility: CLINIC | Age: 59
End: 2024-11-12
Payer: COMMERCIAL

## 2024-11-12 VITALS
WEIGHT: 194 LBS | HEART RATE: 82 BPM | OXYGEN SATURATION: 97 % | RESPIRATION RATE: 16 BRPM | BODY MASS INDEX: 29.5 KG/M2 | SYSTOLIC BLOOD PRESSURE: 136 MMHG | DIASTOLIC BLOOD PRESSURE: 82 MMHG | TEMPERATURE: 99.6 F

## 2024-11-12 PROCEDURE — G2211 COMPLEX E/M VISIT ADD ON: CPT | Mod: NC

## 2024-11-12 PROCEDURE — 99215 OFFICE O/P EST HI 40 MIN: CPT

## 2024-11-12 PROCEDURE — 93005 ELECTROCARDIOGRAM TRACING: CPT

## 2024-11-12 PROCEDURE — 93010 ELECTROCARDIOGRAM REPORT: CPT

## 2024-11-14 ENCOUNTER — NON-APPOINTMENT (OUTPATIENT)
Age: 59
End: 2024-11-14

## 2024-11-14 ENCOUNTER — RESULT REVIEW (OUTPATIENT)
Age: 59
End: 2024-11-14

## 2024-11-14 ENCOUNTER — APPOINTMENT (OUTPATIENT)
Dept: INFUSION THERAPY | Facility: CLINIC | Age: 59
End: 2024-11-14

## 2024-11-14 PROCEDURE — 77293 RESPIRATOR MOTION MGMT SIMUL: CPT | Mod: 26

## 2024-11-14 PROCEDURE — 77301 RADIOTHERAPY DOSE PLAN IMRT: CPT | Mod: 26

## 2024-11-14 PROCEDURE — 77338 DESIGN MLC DEVICE FOR IMRT: CPT | Mod: 26

## 2024-11-18 ENCOUNTER — NON-APPOINTMENT (OUTPATIENT)
Age: 59
End: 2024-11-18

## 2024-11-18 ENCOUNTER — APPOINTMENT (OUTPATIENT)
Dept: CT IMAGING | Facility: CLINIC | Age: 59
End: 2024-11-18
Payer: SUBSIDIZED

## 2024-11-18 ENCOUNTER — OUTPATIENT (OUTPATIENT)
Dept: OUTPATIENT SERVICES | Facility: HOSPITAL | Age: 59
LOS: 1 days | End: 2024-11-18
Payer: SUBSIDIZED

## 2024-11-18 DIAGNOSIS — Z90.49 ACQUIRED ABSENCE OF OTHER SPECIFIED PARTS OF DIGESTIVE TRACT: Chronic | ICD-10-CM

## 2024-11-18 DIAGNOSIS — C18.9 MALIGNANT NEOPLASM OF COLON, UNSPECIFIED: ICD-10-CM

## 2024-11-18 PROCEDURE — 71260 CT THORAX DX C+: CPT

## 2024-11-18 PROCEDURE — 74177 CT ABD & PELVIS W/CONTRAST: CPT

## 2024-11-18 PROCEDURE — 71260 CT THORAX DX C+: CPT | Mod: 26

## 2024-11-18 PROCEDURE — 74177 CT ABD & PELVIS W/CONTRAST: CPT | Mod: 26

## 2024-11-25 ENCOUNTER — APPOINTMENT (OUTPATIENT)
Dept: HEMATOLOGY ONCOLOGY | Facility: CLINIC | Age: 59
End: 2024-11-25

## 2024-11-25 ENCOUNTER — APPOINTMENT (OUTPATIENT)
Dept: INFUSION THERAPY | Facility: HOSPITAL | Age: 59
End: 2024-11-25

## 2024-11-26 ENCOUNTER — APPOINTMENT (OUTPATIENT)
Dept: HEMATOLOGY ONCOLOGY | Facility: CLINIC | Age: 59
End: 2024-11-26
Payer: COMMERCIAL

## 2024-11-26 ENCOUNTER — RESULT REVIEW (OUTPATIENT)
Age: 59
End: 2024-11-26

## 2024-11-26 ENCOUNTER — NON-APPOINTMENT (OUTPATIENT)
Age: 59
End: 2024-11-26

## 2024-11-26 VITALS
TEMPERATURE: 97.2 F | BODY MASS INDEX: 29.43 KG/M2 | SYSTOLIC BLOOD PRESSURE: 150 MMHG | WEIGHT: 193.54 LBS | OXYGEN SATURATION: 97 % | HEART RATE: 77 BPM | RESPIRATION RATE: 16 BRPM | DIASTOLIC BLOOD PRESSURE: 93 MMHG

## 2024-11-26 DIAGNOSIS — C78.7 MALIGNANT NEOPLASM OF COLON, UNSPECIFIED: ICD-10-CM

## 2024-11-26 DIAGNOSIS — C18.9 MALIGNANT NEOPLASM OF COLON, UNSPECIFIED: ICD-10-CM

## 2024-11-26 LAB
ALBUMIN SERPL ELPH-MCNC: 4 G/DL
ALP BLD-CCNC: 84 U/L
ALT SERPL-CCNC: 54 U/L
ANION GAP SERPL CALC-SCNC: 11 MMOL/L
AST SERPL-CCNC: 33 U/L
BASOPHILS # BLD AUTO: 0.02 K/UL — SIGNIFICANT CHANGE UP (ref 0–0.2)
BASOPHILS NFR BLD AUTO: 0.4 % — SIGNIFICANT CHANGE UP (ref 0–2)
BILIRUB SERPL-MCNC: 0.7 MG/DL
BUN SERPL-MCNC: 7 MG/DL
CALCIUM SERPL-MCNC: 9.7 MG/DL
CHLORIDE SERPL-SCNC: 102 MMOL/L
CO2 SERPL-SCNC: 27 MMOL/L
CREAT SERPL-MCNC: 0.66 MG/DL
EGFR: 109 ML/MIN/1.73M2
EOSINOPHIL # BLD AUTO: 0.11 K/UL — SIGNIFICANT CHANGE UP (ref 0–0.5)
EOSINOPHIL NFR BLD AUTO: 2.3 % — SIGNIFICANT CHANGE UP (ref 0–6)
GLUCOSE SERPL-MCNC: 156 MG/DL
HCT VFR BLD CALC: 41.9 % — SIGNIFICANT CHANGE UP (ref 39–50)
HGB BLD-MCNC: 14.3 G/DL — SIGNIFICANT CHANGE UP (ref 13–17)
IMM GRANULOCYTES NFR BLD AUTO: 0.2 % — SIGNIFICANT CHANGE UP (ref 0–0.9)
LYMPHOCYTES # BLD AUTO: 1.45 K/UL — SIGNIFICANT CHANGE UP (ref 1–3.3)
LYMPHOCYTES # BLD AUTO: 30 % — SIGNIFICANT CHANGE UP (ref 13–44)
MCHC RBC-ENTMCNC: 31.1 PG — SIGNIFICANT CHANGE UP (ref 27–34)
MCHC RBC-ENTMCNC: 34.1 G/DL — SIGNIFICANT CHANGE UP (ref 32–36)
MCV RBC AUTO: 91.1 FL — SIGNIFICANT CHANGE UP (ref 80–100)
MONOCYTES # BLD AUTO: 0.58 K/UL — SIGNIFICANT CHANGE UP (ref 0–0.9)
MONOCYTES NFR BLD AUTO: 12 % — SIGNIFICANT CHANGE UP (ref 2–14)
NEUTROPHILS # BLD AUTO: 2.66 K/UL — SIGNIFICANT CHANGE UP (ref 1.8–7.4)
NEUTROPHILS NFR BLD AUTO: 55.1 % — SIGNIFICANT CHANGE UP (ref 43–77)
NRBC # BLD: 0 /100 WBCS — SIGNIFICANT CHANGE UP (ref 0–0)
PLATELET # BLD AUTO: 98 K/UL — LOW (ref 150–400)
POTASSIUM SERPL-SCNC: 4 MMOL/L
PROT SERPL-MCNC: 6.3 G/DL
RBC # BLD: 4.6 M/UL — SIGNIFICANT CHANGE UP (ref 4.2–5.8)
RBC # FLD: 16 % — HIGH (ref 10.3–14.5)
SODIUM SERPL-SCNC: 140 MMOL/L
WBC # BLD: 4.83 K/UL — SIGNIFICANT CHANGE UP (ref 3.8–10.5)
WBC # FLD AUTO: 4.83 K/UL — SIGNIFICANT CHANGE UP (ref 3.8–10.5)

## 2024-11-26 PROCEDURE — G2211 COMPLEX E/M VISIT ADD ON: CPT | Mod: NC

## 2024-11-26 PROCEDURE — 99214 OFFICE O/P EST MOD 30 MIN: CPT

## 2024-11-26 PROCEDURE — 93010 ELECTROCARDIOGRAM REPORT: CPT

## 2024-11-27 PROCEDURE — 77300 RADIATION THERAPY DOSE PLAN: CPT | Mod: 26

## 2024-12-10 ENCOUNTER — APPOINTMENT (OUTPATIENT)
Dept: HEMATOLOGY ONCOLOGY | Facility: CLINIC | Age: 59
End: 2024-12-10

## 2024-12-11 ENCOUNTER — NON-APPOINTMENT (OUTPATIENT)
Age: 59
End: 2024-12-11

## 2024-12-11 VITALS
HEART RATE: 100 BPM | RESPIRATION RATE: 18 BRPM | WEIGHT: 186.84 LBS | HEIGHT: 68 IN | BODY MASS INDEX: 28.32 KG/M2 | OXYGEN SATURATION: 97 % | DIASTOLIC BLOOD PRESSURE: 72 MMHG | SYSTOLIC BLOOD PRESSURE: 108 MMHG | TEMPERATURE: 96.6 F

## 2024-12-11 PROCEDURE — 77435 SBRT MANAGEMENT: CPT

## 2024-12-17 ENCOUNTER — APPOINTMENT (OUTPATIENT)
Dept: INFUSION THERAPY | Facility: HOSPITAL | Age: 59
End: 2024-12-17

## 2024-12-17 ENCOUNTER — RESULT REVIEW (OUTPATIENT)
Age: 59
End: 2024-12-17

## 2024-12-17 ENCOUNTER — NON-APPOINTMENT (OUTPATIENT)
Age: 59
End: 2024-12-17

## 2024-12-17 ENCOUNTER — APPOINTMENT (OUTPATIENT)
Dept: HEMATOLOGY ONCOLOGY | Facility: CLINIC | Age: 59
End: 2024-12-17
Payer: COMMERCIAL

## 2024-12-17 DIAGNOSIS — C18.9 MALIGNANT NEOPLASM OF COLON, UNSPECIFIED: ICD-10-CM

## 2024-12-17 DIAGNOSIS — C78.7 MALIGNANT NEOPLASM OF COLON, UNSPECIFIED: ICD-10-CM

## 2024-12-17 LAB
BASOPHILS # BLD AUTO: 0.04 K/UL — SIGNIFICANT CHANGE UP (ref 0–0.2)
BASOPHILS NFR BLD AUTO: 0.8 % — SIGNIFICANT CHANGE UP (ref 0–2)
EOSINOPHIL # BLD AUTO: 0.1 K/UL — SIGNIFICANT CHANGE UP (ref 0–0.5)
EOSINOPHIL NFR BLD AUTO: 2 % — SIGNIFICANT CHANGE UP (ref 0–6)
HCT VFR BLD CALC: 39.1 % — SIGNIFICANT CHANGE UP (ref 39–50)
HGB BLD-MCNC: 13.6 G/DL — SIGNIFICANT CHANGE UP (ref 13–17)
IMM GRANULOCYTES NFR BLD AUTO: 0.2 % — SIGNIFICANT CHANGE UP (ref 0–0.9)
LYMPHOCYTES # BLD AUTO: 0.82 K/UL — LOW (ref 1–3.3)
LYMPHOCYTES # BLD AUTO: 16.8 % — SIGNIFICANT CHANGE UP (ref 13–44)
MCHC RBC-ENTMCNC: 30.9 PG — SIGNIFICANT CHANGE UP (ref 27–34)
MCHC RBC-ENTMCNC: 34.8 G/DL — SIGNIFICANT CHANGE UP (ref 32–36)
MCV RBC AUTO: 88.9 FL — SIGNIFICANT CHANGE UP (ref 80–100)
MONOCYTES # BLD AUTO: 0.51 K/UL — SIGNIFICANT CHANGE UP (ref 0–0.9)
MONOCYTES NFR BLD AUTO: 10.5 % — SIGNIFICANT CHANGE UP (ref 2–14)
NEUTROPHILS # BLD AUTO: 3.4 K/UL — SIGNIFICANT CHANGE UP (ref 1.8–7.4)
NEUTROPHILS NFR BLD AUTO: 69.7 % — SIGNIFICANT CHANGE UP (ref 43–77)
NRBC # BLD: 0 /100 WBCS — SIGNIFICANT CHANGE UP (ref 0–0)
PLATELET # BLD AUTO: 105 K/UL — LOW (ref 150–400)
RBC # BLD: 4.4 M/UL — SIGNIFICANT CHANGE UP (ref 4.2–5.8)
RBC # FLD: 14.5 % — SIGNIFICANT CHANGE UP (ref 10.3–14.5)
WBC # BLD: 4.88 K/UL — SIGNIFICANT CHANGE UP (ref 3.8–10.5)
WBC # FLD AUTO: 4.88 K/UL — SIGNIFICANT CHANGE UP (ref 3.8–10.5)

## 2024-12-17 PROCEDURE — G2211 COMPLEX E/M VISIT ADD ON: CPT | Mod: NC

## 2024-12-17 PROCEDURE — 99214 OFFICE O/P EST MOD 30 MIN: CPT

## 2024-12-17 RX ORDER — CYCLOBENZAPRINE HYDROCHLORIDE 10 MG/1
10 TABLET, FILM COATED ORAL
Qty: 30 | Refills: 0 | Status: ACTIVE | COMMUNITY
Start: 2024-12-17 | End: 1900-01-01

## 2024-12-18 ENCOUNTER — NON-APPOINTMENT (OUTPATIENT)
Age: 59
End: 2024-12-18

## 2024-12-24 ENCOUNTER — APPOINTMENT (OUTPATIENT)
Dept: INFUSION THERAPY | Facility: HOSPITAL | Age: 59
End: 2024-12-24

## 2024-12-24 ENCOUNTER — APPOINTMENT (OUTPATIENT)
Dept: HEMATOLOGY ONCOLOGY | Facility: CLINIC | Age: 59
End: 2024-12-24

## 2024-12-27 ENCOUNTER — OUTPATIENT (OUTPATIENT)
Dept: OUTPATIENT SERVICES | Facility: HOSPITAL | Age: 59
LOS: 1 days | Discharge: HOME | End: 2024-12-27

## 2024-12-27 DIAGNOSIS — Z90.49 ACQUIRED ABSENCE OF OTHER SPECIFIED PARTS OF DIGESTIVE TRACT: Chronic | ICD-10-CM

## 2024-12-27 DIAGNOSIS — Z98.890 OTHER SPECIFIED POSTPROCEDURAL STATES: Chronic | ICD-10-CM

## 2024-12-27 DIAGNOSIS — C18.9 MALIGNANT NEOPLASM OF COLON, UNSPECIFIED: ICD-10-CM

## 2024-12-31 ENCOUNTER — RESULT REVIEW (OUTPATIENT)
Age: 59
End: 2024-12-31

## 2024-12-31 ENCOUNTER — APPOINTMENT (OUTPATIENT)
Dept: INFUSION THERAPY | Facility: HOSPITAL | Age: 59
End: 2024-12-31

## 2024-12-31 ENCOUNTER — NON-APPOINTMENT (OUTPATIENT)
Age: 59
End: 2024-12-31

## 2024-12-31 LAB
BASOPHILS # BLD AUTO: 0.02 K/UL — SIGNIFICANT CHANGE UP (ref 0–0.2)
BASOPHILS NFR BLD AUTO: 0.8 % — SIGNIFICANT CHANGE UP (ref 0–2)
EOSINOPHIL # BLD AUTO: 0.15 K/UL — SIGNIFICANT CHANGE UP (ref 0–0.5)
EOSINOPHIL NFR BLD AUTO: 5.9 % — SIGNIFICANT CHANGE UP (ref 0–6)
HCT VFR BLD CALC: 37.5 % — LOW (ref 39–50)
HGB BLD-MCNC: 13.2 G/DL — SIGNIFICANT CHANGE UP (ref 13–17)
IMM GRANULOCYTES NFR BLD AUTO: 0.4 % — SIGNIFICANT CHANGE UP (ref 0–0.9)
LYMPHOCYTES # BLD AUTO: 0.67 K/UL — LOW (ref 1–3.3)
LYMPHOCYTES # BLD AUTO: 26.4 % — SIGNIFICANT CHANGE UP (ref 13–44)
MCHC RBC-ENTMCNC: 31.6 PG — SIGNIFICANT CHANGE UP (ref 27–34)
MCHC RBC-ENTMCNC: 35.2 G/DL — SIGNIFICANT CHANGE UP (ref 32–36)
MCV RBC AUTO: 89.7 FL — SIGNIFICANT CHANGE UP (ref 80–100)
MONOCYTES # BLD AUTO: 0.34 K/UL — SIGNIFICANT CHANGE UP (ref 0–0.9)
MONOCYTES NFR BLD AUTO: 13.4 % — SIGNIFICANT CHANGE UP (ref 2–14)
NEUTROPHILS # BLD AUTO: 1.35 K/UL — LOW (ref 1.8–7.4)
NEUTROPHILS NFR BLD AUTO: 53.1 % — SIGNIFICANT CHANGE UP (ref 43–77)
NRBC # BLD: 0 /100 WBCS — SIGNIFICANT CHANGE UP (ref 0–0)
NRBC BLD-RTO: 0 /100 WBCS — SIGNIFICANT CHANGE UP (ref 0–0)
PLAT MORPH BLD: NORMAL — SIGNIFICANT CHANGE UP
PLATELET # BLD AUTO: 65 K/UL — LOW (ref 150–400)
RBC # BLD: 4.18 M/UL — LOW (ref 4.2–5.8)
RBC # FLD: 15 % — HIGH (ref 10.3–14.5)
RBC BLD AUTO: SIGNIFICANT CHANGE UP
WBC # BLD: 2.54 K/UL — LOW (ref 3.8–10.5)
WBC # FLD AUTO: 2.54 K/UL — LOW (ref 3.8–10.5)

## 2025-01-13 LAB
ALBUMIN SERPL ELPH-MCNC: 4.1 G/DL
ALP BLD-CCNC: 127 U/L
ALT SERPL-CCNC: 72 U/L
ANION GAP SERPL CALC-SCNC: 12 MMOL/L
AST SERPL-CCNC: 66 U/L
BASOPHILS # BLD AUTO: 0.06 K/UL
BASOPHILS NFR BLD AUTO: 1.5 %
BILIRUB SERPL-MCNC: 0.9 MG/DL
BUN SERPL-MCNC: 8 MG/DL
CALCIUM SERPL-MCNC: 9.7 MG/DL
CHLORIDE SERPL-SCNC: 102 MMOL/L
CO2 SERPL-SCNC: 24 MMOL/L
CREAT SERPL-MCNC: 0.61 MG/DL
EGFR: 112 ML/MIN/1.73M2
EOSINOPHIL # BLD AUTO: 0.14 K/UL
EOSINOPHIL NFR BLD AUTO: 3.5 %
GLUCOSE SERPL-MCNC: 120 MG/DL
HCT VFR BLD CALC: 45 %
HGB BLD-MCNC: 15.1 G/DL
IMM GRANULOCYTES NFR BLD AUTO: 0.2 %
LYMPHOCYTES # BLD AUTO: 1.27 K/UL
LYMPHOCYTES NFR BLD AUTO: 31.4 %
MAN DIFF?: NORMAL
MCHC RBC-ENTMCNC: 30.8 PG
MCHC RBC-ENTMCNC: 33.6 G/DL
MCV RBC AUTO: 91.8 FL
MONOCYTES # BLD AUTO: 0.63 K/UL
MONOCYTES NFR BLD AUTO: 15.6 %
NEUTROPHILS # BLD AUTO: 1.93 K/UL
NEUTROPHILS NFR BLD AUTO: 47.8 %
PLATELET # BLD AUTO: 105 K/UL
POTASSIUM SERPL-SCNC: 5.4 MMOL/L
PROT SERPL-MCNC: 6.5 G/DL
RBC # BLD: 4.9 M/UL
RBC # FLD: 15.3 %
SODIUM SERPL-SCNC: 138 MMOL/L
WBC # FLD AUTO: 4.04 K/UL

## 2025-01-14 ENCOUNTER — RESULT REVIEW (OUTPATIENT)
Age: 60
End: 2025-01-14

## 2025-01-14 ENCOUNTER — APPOINTMENT (OUTPATIENT)
Dept: HEMATOLOGY ONCOLOGY | Facility: CLINIC | Age: 60
End: 2025-01-14
Payer: COMMERCIAL

## 2025-01-14 ENCOUNTER — APPOINTMENT (OUTPATIENT)
Dept: INFUSION THERAPY | Facility: HOSPITAL | Age: 60
End: 2025-01-14

## 2025-01-14 ENCOUNTER — APPOINTMENT (OUTPATIENT)
Dept: RADIATION ONCOLOGY | Facility: CLINIC | Age: 60
End: 2025-01-14
Payer: COMMERCIAL

## 2025-01-14 VITALS
TEMPERATURE: 97.7 F | OXYGEN SATURATION: 99 % | WEIGHT: 188.2 LBS | HEIGHT: 68 IN | BODY MASS INDEX: 28.52 KG/M2 | DIASTOLIC BLOOD PRESSURE: 85 MMHG | RESPIRATION RATE: 18 BRPM | SYSTOLIC BLOOD PRESSURE: 156 MMHG | HEART RATE: 82 BPM

## 2025-01-14 DIAGNOSIS — C78.7 MALIGNANT NEOPLASM OF COLON, UNSPECIFIED: ICD-10-CM

## 2025-01-14 DIAGNOSIS — C78.00 SECONDARY MALIGNANT NEOPLASM OF UNSPECIFIED LUNG: ICD-10-CM

## 2025-01-14 DIAGNOSIS — M54.50 LOW BACK PAIN, UNSPECIFIED: ICD-10-CM

## 2025-01-14 DIAGNOSIS — R97.0 ELEVATED CARCINOEMBRYONIC ANTIGEN [CEA]: ICD-10-CM

## 2025-01-14 DIAGNOSIS — C18.4 MALIGNANT NEOPLASM OF TRANSVERSE COLON: ICD-10-CM

## 2025-01-14 DIAGNOSIS — C77.2 SECONDARY AND UNSPECIFIED MALIGNANT NEOPLASM OF INTRA-ABDOMINAL LYMPH NODES: ICD-10-CM

## 2025-01-14 DIAGNOSIS — C18.9 MALIGNANT NEOPLASM OF COLON, UNSPECIFIED: ICD-10-CM

## 2025-01-14 LAB
ALBUMIN SERPL ELPH-MCNC: 3.8 G/DL — SIGNIFICANT CHANGE UP (ref 3.3–5)
ALP SERPL-CCNC: 104 U/L — SIGNIFICANT CHANGE UP (ref 40–120)
ALT FLD-CCNC: 62 U/L — HIGH (ref 10–45)
ANION GAP SERPL CALC-SCNC: 15 MMOL/L — SIGNIFICANT CHANGE UP (ref 5–17)
APPEARANCE UR: CLEAR — SIGNIFICANT CHANGE UP
AST SERPL-CCNC: 47 U/L — HIGH (ref 10–40)
BILIRUB SERPL-MCNC: 1.1 MG/DL — SIGNIFICANT CHANGE UP (ref 0.2–1.2)
BILIRUB UR-MCNC: NEGATIVE — SIGNIFICANT CHANGE UP
BUN SERPL-MCNC: 9 MG/DL — SIGNIFICANT CHANGE UP (ref 7–23)
CALCIUM SERPL-MCNC: 9.7 MG/DL — SIGNIFICANT CHANGE UP (ref 8.4–10.5)
CEA SERPL-MCNC: 44.4 NG/ML — HIGH (ref 0–3.8)
CHLORIDE SERPL-SCNC: 98 MMOL/L — SIGNIFICANT CHANGE UP (ref 96–108)
CO2 SERPL-SCNC: 23 MMOL/L — SIGNIFICANT CHANGE UP (ref 22–31)
COLOR SPEC: YELLOW — SIGNIFICANT CHANGE UP
CREAT ?TM UR-MCNC: 147 MG/DL — SIGNIFICANT CHANGE UP
CREAT SERPL-MCNC: 0.55 MG/DL — SIGNIFICANT CHANGE UP (ref 0.5–1.3)
DIFF PNL FLD: NEGATIVE — SIGNIFICANT CHANGE UP
EGFR: 116 ML/MIN/1.73M2 — SIGNIFICANT CHANGE UP
GLUCOSE SERPL-MCNC: 198 MG/DL — HIGH (ref 70–99)
GLUCOSE UR QL: >=1000 MG/DL
KETONES UR-MCNC: NEGATIVE MG/DL — SIGNIFICANT CHANGE UP
LDH SERPL L TO P-CCNC: 234 U/L — SIGNIFICANT CHANGE UP (ref 50–242)
LEUKOCYTE ESTERASE UR-ACNC: NEGATIVE — SIGNIFICANT CHANGE UP
MAGNESIUM SERPL-MCNC: 1.9 MG/DL — SIGNIFICANT CHANGE UP (ref 1.6–2.6)
NITRITE UR-MCNC: NEGATIVE — SIGNIFICANT CHANGE UP
PH UR: 5.5 — SIGNIFICANT CHANGE UP (ref 5–8)
POTASSIUM SERPL-MCNC: 3.8 MMOL/L — SIGNIFICANT CHANGE UP (ref 3.5–5.3)
POTASSIUM SERPL-SCNC: 3.8 MMOL/L — SIGNIFICANT CHANGE UP (ref 3.5–5.3)
PROT ?TM UR-MCNC: 15 MG/DL — HIGH (ref 0–12)
PROT SERPL-MCNC: 6.4 G/DL — SIGNIFICANT CHANGE UP (ref 6–8.3)
PROT UR-MCNC: SIGNIFICANT CHANGE UP MG/DL
PROT/CREAT UR-RTO: 0.1 RATIO — SIGNIFICANT CHANGE UP (ref 0–0.2)
SODIUM SERPL-SCNC: 137 MMOL/L — SIGNIFICANT CHANGE UP (ref 135–145)
SP GR SPEC: 1.03 — SIGNIFICANT CHANGE UP (ref 1–1.03)
UROBILINOGEN FLD QL: 1 MG/DL — SIGNIFICANT CHANGE UP (ref 0.2–1)

## 2025-01-14 PROCEDURE — G2211 COMPLEX E/M VISIT ADD ON: CPT | Mod: NC

## 2025-01-14 PROCEDURE — 99213 OFFICE O/P EST LOW 20 MIN: CPT

## 2025-01-15 ENCOUNTER — NON-APPOINTMENT (OUTPATIENT)
Age: 60
End: 2025-01-15

## 2025-01-21 DIAGNOSIS — R11.2 NAUSEA WITH VOMITING, UNSPECIFIED: ICD-10-CM

## 2025-01-21 DIAGNOSIS — Z51.11 ENCOUNTER FOR ANTINEOPLASTIC CHEMOTHERAPY: ICD-10-CM

## 2025-01-26 LAB
ALBUMIN SERPL ELPH-MCNC: 3.7 G/DL
ALP BLD-CCNC: 111 U/L
ALT SERPL-CCNC: 76 U/L
ANION GAP SERPL CALC-SCNC: 9 MMOL/L
AST SERPL-CCNC: 51 U/L
BASOPHILS # BLD AUTO: 0.01 K/UL
BASOPHILS NFR BLD AUTO: 0.4 %
BILIRUB SERPL-MCNC: 0.5 MG/DL
BUN SERPL-MCNC: 11 MG/DL
CALCIUM SERPL-MCNC: 9.3 MG/DL
CHLORIDE SERPL-SCNC: 105 MMOL/L
CO2 SERPL-SCNC: 27 MMOL/L
CREAT SERPL-MCNC: 0.69 MG/DL
EGFR: 108 ML/MIN/1.73M2
EOSINOPHIL # BLD AUTO: 0.16 K/UL
EOSINOPHIL NFR BLD AUTO: 6.6 %
GLUCOSE SERPL-MCNC: 131 MG/DL
HCT VFR BLD CALC: 40.8 %
HGB BLD-MCNC: 13.6 G/DL
IMM GRANULOCYTES NFR BLD AUTO: 0 %
LYMPHOCYTES # BLD AUTO: 0.9 K/UL
LYMPHOCYTES NFR BLD AUTO: 37.3 %
MAGNESIUM SERPL-MCNC: 1.8 MG/DL
MAN DIFF?: NORMAL
MCHC RBC-ENTMCNC: 31.3 PG
MCHC RBC-ENTMCNC: 33.3 G/DL
MCV RBC AUTO: 93.8 FL
MONOCYTES # BLD AUTO: 0.29 K/UL
MONOCYTES NFR BLD AUTO: 12 %
NEUTROPHILS # BLD AUTO: 1.05 K/UL
NEUTROPHILS NFR BLD AUTO: 43.7 %
PLATELET # BLD AUTO: 96 K/UL
POTASSIUM SERPL-SCNC: 4.2 MMOL/L
PROT SERPL-MCNC: 5.7 G/DL
RBC # BLD: 4.35 M/UL
RBC # FLD: 14.5 %
SODIUM SERPL-SCNC: 141 MMOL/L
WBC # FLD AUTO: 2.41 K/UL

## 2025-01-28 ENCOUNTER — APPOINTMENT (OUTPATIENT)
Dept: HEMATOLOGY ONCOLOGY | Facility: CLINIC | Age: 60
End: 2025-01-28
Payer: COMMERCIAL

## 2025-01-28 ENCOUNTER — RESULT REVIEW (OUTPATIENT)
Age: 60
End: 2025-01-28

## 2025-01-28 ENCOUNTER — APPOINTMENT (OUTPATIENT)
Dept: HEMATOLOGY ONCOLOGY | Facility: CLINIC | Age: 60
End: 2025-01-28

## 2025-01-28 ENCOUNTER — APPOINTMENT (OUTPATIENT)
Dept: INFUSION THERAPY | Facility: HOSPITAL | Age: 60
End: 2025-01-28

## 2025-01-28 DIAGNOSIS — C18.9 MALIGNANT NEOPLASM OF COLON, UNSPECIFIED: ICD-10-CM

## 2025-01-28 DIAGNOSIS — C78.7 MALIGNANT NEOPLASM OF COLON, UNSPECIFIED: ICD-10-CM

## 2025-01-28 LAB
BASOPHILS # BLD AUTO: 0.02 K/UL — SIGNIFICANT CHANGE UP (ref 0–0.2)
BASOPHILS NFR BLD AUTO: 0.7 % — SIGNIFICANT CHANGE UP (ref 0–2)
EOSINOPHIL # BLD AUTO: 0.17 K/UL — SIGNIFICANT CHANGE UP (ref 0–0.5)
EOSINOPHIL NFR BLD AUTO: 5.9 % — SIGNIFICANT CHANGE UP (ref 0–6)
HCT VFR BLD CALC: 36.8 % — LOW (ref 39–50)
HGB BLD-MCNC: 12.9 G/DL — LOW (ref 13–17)
IMM GRANULOCYTES NFR BLD AUTO: 0.3 % — SIGNIFICANT CHANGE UP (ref 0–0.9)
LYMPHOCYTES # BLD AUTO: 0.75 K/UL — LOW (ref 1–3.3)
LYMPHOCYTES # BLD AUTO: 26.2 % — SIGNIFICANT CHANGE UP (ref 13–44)
MCHC RBC-ENTMCNC: 31.5 PG — SIGNIFICANT CHANGE UP (ref 27–34)
MCHC RBC-ENTMCNC: 35.1 G/DL — SIGNIFICANT CHANGE UP (ref 32–36)
MCV RBC AUTO: 89.8 FL — SIGNIFICANT CHANGE UP (ref 80–100)
MONOCYTES # BLD AUTO: 0.41 K/UL — SIGNIFICANT CHANGE UP (ref 0–0.9)
MONOCYTES NFR BLD AUTO: 14.3 % — HIGH (ref 2–14)
NEUTROPHILS # BLD AUTO: 1.5 K/UL — LOW (ref 1.8–7.4)
NEUTROPHILS NFR BLD AUTO: 52.6 % — SIGNIFICANT CHANGE UP (ref 43–77)
NRBC # BLD: 0 /100 WBCS — SIGNIFICANT CHANGE UP (ref 0–0)
NRBC BLD-RTO: 0 /100 WBCS — SIGNIFICANT CHANGE UP (ref 0–0)
PLATELET # BLD AUTO: 67 K/UL — LOW (ref 150–400)
RBC # BLD: 4.1 M/UL — LOW (ref 4.2–5.8)
RBC # FLD: 13.8 % — SIGNIFICANT CHANGE UP (ref 10.3–14.5)
WBC # BLD: 2.86 K/UL — LOW (ref 3.8–10.5)
WBC # FLD AUTO: 2.86 K/UL — LOW (ref 3.8–10.5)

## 2025-01-28 PROCEDURE — G2211 COMPLEX E/M VISIT ADD ON: CPT | Mod: NC

## 2025-01-28 PROCEDURE — 99215 OFFICE O/P EST HI 40 MIN: CPT

## 2025-01-29 ENCOUNTER — NON-APPOINTMENT (OUTPATIENT)
Age: 60
End: 2025-01-29

## 2025-02-04 ENCOUNTER — LABORATORY RESULT (OUTPATIENT)
Age: 60
End: 2025-02-04

## 2025-02-04 LAB
BASOPHILS # BLD AUTO: 0.01 K/UL
BASOPHILS NFR BLD AUTO: 0.4 %
CEA SERPL-MCNC: 53.9 NG/ML
EOSINOPHIL # BLD AUTO: 0.11 K/UL
EOSINOPHIL NFR BLD AUTO: 4.1 %
HCT VFR BLD CALC: 42.3 %
HGB BLD-MCNC: 14.4 G/DL
IMM GRANULOCYTES NFR BLD AUTO: 0 %
LYMPHOCYTES # BLD AUTO: 0.92 K/UL
LYMPHOCYTES NFR BLD AUTO: 34.3 %
MAN DIFF?: NORMAL
MCHC RBC-ENTMCNC: 30.7 PG
MCHC RBC-ENTMCNC: 34 G/DL
MCV RBC AUTO: 90.2 FL
MONOCYTES # BLD AUTO: 0.52 K/UL
MONOCYTES NFR BLD AUTO: 19.4 %
NEUTROPHILS # BLD AUTO: 1.12 K/UL
NEUTROPHILS NFR BLD AUTO: 41.8 %
PLATELET # BLD AUTO: 103 K/UL
RBC # BLD: 4.69 M/UL
RBC # FLD: 15 %
WBC # FLD AUTO: 2.68 K/UL

## 2025-02-11 ENCOUNTER — APPOINTMENT (OUTPATIENT)
Dept: INFUSION THERAPY | Facility: HOSPITAL | Age: 60
End: 2025-02-11

## 2025-02-11 ENCOUNTER — APPOINTMENT (OUTPATIENT)
Dept: HEMATOLOGY ONCOLOGY | Facility: CLINIC | Age: 60
End: 2025-02-11

## 2025-02-19 ENCOUNTER — LABORATORY RESULT (OUTPATIENT)
Age: 60
End: 2025-02-19

## 2025-02-21 ENCOUNTER — APPOINTMENT (OUTPATIENT)
Dept: CT IMAGING | Facility: CLINIC | Age: 60
End: 2025-02-21
Payer: COMMERCIAL

## 2025-02-21 ENCOUNTER — OUTPATIENT (OUTPATIENT)
Dept: OUTPATIENT SERVICES | Facility: HOSPITAL | Age: 60
LOS: 1 days | End: 2025-02-21
Payer: COMMERCIAL

## 2025-02-21 ENCOUNTER — APPOINTMENT (OUTPATIENT)
Dept: MRI IMAGING | Facility: CLINIC | Age: 60
End: 2025-02-21
Payer: COMMERCIAL

## 2025-02-21 DIAGNOSIS — C18.9 MALIGNANT NEOPLASM OF COLON, UNSPECIFIED: ICD-10-CM

## 2025-02-21 DIAGNOSIS — Z98.890 OTHER SPECIFIED POSTPROCEDURAL STATES: Chronic | ICD-10-CM

## 2025-02-21 DIAGNOSIS — Z90.49 ACQUIRED ABSENCE OF OTHER SPECIFIED PARTS OF DIGESTIVE TRACT: Chronic | ICD-10-CM

## 2025-02-21 PROCEDURE — 74183 MRI ABD W/O CNTR FLWD CNTR: CPT | Mod: 26

## 2025-02-21 PROCEDURE — 74177 CT ABD & PELVIS W/CONTRAST: CPT | Mod: 26

## 2025-02-21 PROCEDURE — 71260 CT THORAX DX C+: CPT | Mod: 26

## 2025-02-21 PROCEDURE — A9585: CPT

## 2025-02-21 PROCEDURE — 74183 MRI ABD W/O CNTR FLWD CNTR: CPT

## 2025-02-21 PROCEDURE — 74177 CT ABD & PELVIS W/CONTRAST: CPT

## 2025-02-21 PROCEDURE — 71260 CT THORAX DX C+: CPT

## 2025-02-24 ENCOUNTER — APPOINTMENT (OUTPATIENT)
Dept: ORTHOPEDIC SURGERY | Facility: CLINIC | Age: 60
End: 2025-02-24
Payer: COMMERCIAL

## 2025-02-24 DIAGNOSIS — M54.50 LOW BACK PAIN, UNSPECIFIED: ICD-10-CM

## 2025-02-24 PROCEDURE — 99204 OFFICE O/P NEW MOD 45 MIN: CPT

## 2025-02-24 PROCEDURE — 72082 X-RAY EXAM ENTIRE SPI 2/3 VW: CPT

## 2025-02-24 RX ORDER — TIZANIDINE 2 MG/1
2 TABLET ORAL EVERY 6 HOURS
Qty: 56 | Refills: 1 | Status: ACTIVE | COMMUNITY
Start: 2025-02-24 | End: 1900-01-01

## 2025-02-24 RX ORDER — DICLOFENAC SODIUM 75 MG/1
75 TABLET, DELAYED RELEASE ORAL
Qty: 28 | Refills: 1 | Status: ACTIVE | COMMUNITY
Start: 2025-02-24 | End: 1900-01-01

## 2025-02-24 RX ORDER — GABAPENTIN 100 MG/1
100 CAPSULE ORAL TWICE DAILY
Qty: 42 | Refills: 0 | Status: ACTIVE | COMMUNITY
Start: 2025-02-24 | End: 1900-01-01

## 2025-02-25 ENCOUNTER — APPOINTMENT (OUTPATIENT)
Dept: HEMATOLOGY ONCOLOGY | Facility: CLINIC | Age: 60
End: 2025-02-25

## 2025-02-25 ENCOUNTER — APPOINTMENT (OUTPATIENT)
Dept: INFUSION THERAPY | Facility: HOSPITAL | Age: 60
End: 2025-02-25

## 2025-02-27 ENCOUNTER — NON-APPOINTMENT (OUTPATIENT)
Age: 60
End: 2025-02-27

## 2025-03-10 ENCOUNTER — OUTPATIENT (OUTPATIENT)
Dept: OUTPATIENT SERVICES | Facility: HOSPITAL | Age: 60
LOS: 1 days | End: 2025-03-10
Payer: COMMERCIAL

## 2025-03-10 ENCOUNTER — APPOINTMENT (OUTPATIENT)
Dept: MRI IMAGING | Facility: CLINIC | Age: 60
End: 2025-03-10
Payer: COMMERCIAL

## 2025-03-10 ENCOUNTER — OUTPATIENT (OUTPATIENT)
Dept: OUTPATIENT SERVICES | Facility: HOSPITAL | Age: 60
LOS: 1 days | Discharge: HOME | End: 2025-03-10
Payer: COMMERCIAL

## 2025-03-10 DIAGNOSIS — C18.9 MALIGNANT NEOPLASM OF COLON, UNSPECIFIED: ICD-10-CM

## 2025-03-10 DIAGNOSIS — M54.50 LOW BACK PAIN, UNSPECIFIED: ICD-10-CM

## 2025-03-10 DIAGNOSIS — Z98.890 OTHER SPECIFIED POSTPROCEDURAL STATES: Chronic | ICD-10-CM

## 2025-03-10 DIAGNOSIS — Z90.49 ACQUIRED ABSENCE OF OTHER SPECIFIED PARTS OF DIGESTIVE TRACT: Chronic | ICD-10-CM

## 2025-03-10 PROCEDURE — 72157 MRI CHEST SPINE W/O & W/DYE: CPT

## 2025-03-10 PROCEDURE — 72158 MRI LUMBAR SPINE W/O & W/DYE: CPT

## 2025-03-10 PROCEDURE — 72158 MRI LUMBAR SPINE W/O & W/DYE: CPT | Mod: 26

## 2025-03-10 PROCEDURE — 72157 MRI CHEST SPINE W/O & W/DYE: CPT | Mod: 26

## 2025-03-10 PROCEDURE — A9585: CPT

## 2025-03-11 ENCOUNTER — APPOINTMENT (OUTPATIENT)
Dept: INFUSION THERAPY | Facility: HOSPITAL | Age: 60
End: 2025-03-11

## 2025-03-11 ENCOUNTER — APPOINTMENT (OUTPATIENT)
Dept: HEMATOLOGY ONCOLOGY | Facility: CLINIC | Age: 60
End: 2025-03-11
Payer: COMMERCIAL

## 2025-03-11 ENCOUNTER — RESULT REVIEW (OUTPATIENT)
Age: 60
End: 2025-03-11

## 2025-03-11 ENCOUNTER — NON-APPOINTMENT (OUTPATIENT)
Age: 60
End: 2025-03-11

## 2025-03-11 VITALS
WEIGHT: 184.08 LBS | DIASTOLIC BLOOD PRESSURE: 82 MMHG | OXYGEN SATURATION: 96 % | TEMPERATURE: 98.8 F | BODY MASS INDEX: 28.89 KG/M2 | HEIGHT: 67 IN | HEART RATE: 95 BPM | SYSTOLIC BLOOD PRESSURE: 124 MMHG | RESPIRATION RATE: 18 BRPM

## 2025-03-11 DIAGNOSIS — C18.9 MALIGNANT NEOPLASM OF COLON, UNSPECIFIED: ICD-10-CM

## 2025-03-11 DIAGNOSIS — C78.7 MALIGNANT NEOPLASM OF COLON, UNSPECIFIED: ICD-10-CM

## 2025-03-11 LAB
ALBUMIN SERPL ELPH-MCNC: 3.9 G/DL — SIGNIFICANT CHANGE UP (ref 3.3–5)
ALP SERPL-CCNC: 148 U/L — HIGH (ref 40–120)
ALT FLD-CCNC: 104 U/L — HIGH (ref 10–45)
ANION GAP SERPL CALC-SCNC: 11 MMOL/L — SIGNIFICANT CHANGE UP (ref 5–17)
AST SERPL-CCNC: 64 U/L — HIGH (ref 10–40)
BASOPHILS # BLD AUTO: 0.03 K/UL — SIGNIFICANT CHANGE UP (ref 0–0.2)
BASOPHILS NFR BLD AUTO: 0.6 % — SIGNIFICANT CHANGE UP (ref 0–2)
BILIRUB SERPL-MCNC: 0.8 MG/DL — SIGNIFICANT CHANGE UP (ref 0.2–1.2)
BUN SERPL-MCNC: 8 MG/DL — SIGNIFICANT CHANGE UP (ref 7–23)
CALCIUM SERPL-MCNC: 9.6 MG/DL — SIGNIFICANT CHANGE UP (ref 8.4–10.5)
CHLORIDE SERPL-SCNC: 104 MMOL/L — SIGNIFICANT CHANGE UP (ref 96–108)
CO2 SERPL-SCNC: 26 MMOL/L — SIGNIFICANT CHANGE UP (ref 22–31)
CREAT SERPL-MCNC: 0.54 MG/DL — SIGNIFICANT CHANGE UP (ref 0.5–1.3)
EGFR: 116 ML/MIN/1.73M2 — SIGNIFICANT CHANGE UP
EGFR: 116 ML/MIN/1.73M2 — SIGNIFICANT CHANGE UP
EOSINOPHIL # BLD AUTO: 0.21 K/UL — SIGNIFICANT CHANGE UP (ref 0–0.5)
EOSINOPHIL NFR BLD AUTO: 4 % — SIGNIFICANT CHANGE UP (ref 0–6)
GLUCOSE SERPL-MCNC: 97 MG/DL — SIGNIFICANT CHANGE UP (ref 70–99)
HCT VFR BLD CALC: 42.5 % — SIGNIFICANT CHANGE UP (ref 39–50)
HGB BLD-MCNC: 14.6 G/DL — SIGNIFICANT CHANGE UP (ref 13–17)
IMM GRANULOCYTES NFR BLD AUTO: 0.2 % — SIGNIFICANT CHANGE UP (ref 0–0.9)
LYMPHOCYTES # BLD AUTO: 1.24 K/UL — SIGNIFICANT CHANGE UP (ref 1–3.3)
LYMPHOCYTES # BLD AUTO: 23.5 % — SIGNIFICANT CHANGE UP (ref 13–44)
MCHC RBC-ENTMCNC: 31.1 PG — SIGNIFICANT CHANGE UP (ref 27–34)
MCHC RBC-ENTMCNC: 34.4 G/DL — SIGNIFICANT CHANGE UP (ref 32–36)
MCV RBC AUTO: 90.6 FL — SIGNIFICANT CHANGE UP (ref 80–100)
MONOCYTES # BLD AUTO: 0.66 K/UL — SIGNIFICANT CHANGE UP (ref 0–0.9)
MONOCYTES NFR BLD AUTO: 12.5 % — SIGNIFICANT CHANGE UP (ref 2–14)
NEUTROPHILS # BLD AUTO: 3.12 K/UL — SIGNIFICANT CHANGE UP (ref 1.8–7.4)
NEUTROPHILS NFR BLD AUTO: 59.2 % — SIGNIFICANT CHANGE UP (ref 43–77)
NRBC BLD AUTO-RTO: 0 /100 WBCS — SIGNIFICANT CHANGE UP (ref 0–0)
PLATELET # BLD AUTO: 140 K/UL — LOW (ref 150–400)
POTASSIUM SERPL-MCNC: 4 MMOL/L — SIGNIFICANT CHANGE UP (ref 3.5–5.3)
POTASSIUM SERPL-SCNC: 4 MMOL/L — SIGNIFICANT CHANGE UP (ref 3.5–5.3)
PROT SERPL-MCNC: 6.9 G/DL — SIGNIFICANT CHANGE UP (ref 6–8.3)
RBC # BLD: 4.69 M/UL — SIGNIFICANT CHANGE UP (ref 4.2–5.8)
RBC # FLD: 14.4 % — SIGNIFICANT CHANGE UP (ref 10.3–14.5)
SODIUM SERPL-SCNC: 141 MMOL/L — SIGNIFICANT CHANGE UP (ref 135–145)
WBC # BLD: 5.27 K/UL — SIGNIFICANT CHANGE UP (ref 3.8–10.5)
WBC # FLD AUTO: 5.27 K/UL — SIGNIFICANT CHANGE UP (ref 3.8–10.5)

## 2025-03-11 PROCEDURE — G2211 COMPLEX E/M VISIT ADD ON: CPT | Mod: NC

## 2025-03-11 PROCEDURE — 99215 OFFICE O/P EST HI 40 MIN: CPT

## 2025-03-11 PROCEDURE — G2212 PROLONG OUTPT/OFFICE VIS: CPT

## 2025-03-11 PROCEDURE — 93010 ELECTROCARDIOGRAM REPORT: CPT

## 2025-03-12 ENCOUNTER — APPOINTMENT (OUTPATIENT)
Dept: ORTHOPEDIC SURGERY | Facility: CLINIC | Age: 60
End: 2025-03-12
Payer: COMMERCIAL

## 2025-03-12 VITALS
WEIGHT: 184 LBS | DIASTOLIC BLOOD PRESSURE: 81 MMHG | HEIGHT: 67 IN | BODY MASS INDEX: 28.88 KG/M2 | SYSTOLIC BLOOD PRESSURE: 129 MMHG

## 2025-03-12 DIAGNOSIS — M54.50 LOW BACK PAIN, UNSPECIFIED: ICD-10-CM

## 2025-03-12 LAB — CEA SERPL-MCNC: 67.4 NG/ML — HIGH (ref 0–3.8)

## 2025-03-12 PROCEDURE — 99214 OFFICE O/P EST MOD 30 MIN: CPT

## 2025-03-14 RX ORDER — DICLOFENAC SODIUM 75 MG/1
75 TABLET, DELAYED RELEASE ORAL
Qty: 28 | Refills: 1 | Status: ACTIVE | COMMUNITY
Start: 2025-03-14 | End: 1900-01-01

## 2025-03-19 ENCOUNTER — NON-APPOINTMENT (OUTPATIENT)
Age: 60
End: 2025-03-19

## 2025-03-20 ENCOUNTER — APPOINTMENT (OUTPATIENT)
Dept: CV DIAGNOSITCS | Facility: HOSPITAL | Age: 60
End: 2025-03-20

## 2025-03-20 ENCOUNTER — APPOINTMENT (OUTPATIENT)
Dept: CT IMAGING | Facility: IMAGING CENTER | Age: 60
End: 2025-03-20
Payer: COMMERCIAL

## 2025-03-20 ENCOUNTER — OUTPATIENT (OUTPATIENT)
Dept: OUTPATIENT SERVICES | Facility: HOSPITAL | Age: 60
LOS: 1 days | End: 2025-03-20
Payer: SUBSIDIZED

## 2025-03-20 ENCOUNTER — RESULT REVIEW (OUTPATIENT)
Age: 60
End: 2025-03-20

## 2025-03-20 DIAGNOSIS — Z98.890 OTHER SPECIFIED POSTPROCEDURAL STATES: Chronic | ICD-10-CM

## 2025-03-20 DIAGNOSIS — C18.9 MALIGNANT NEOPLASM OF COLON, UNSPECIFIED: ICD-10-CM

## 2025-03-20 DIAGNOSIS — C78.7 SECONDARY MALIGNANT NEOPLASM OF LIVER AND INTRAHEPATIC BILE DUCT: ICD-10-CM

## 2025-03-20 PROCEDURE — 71260 CT THORAX DX C+: CPT | Mod: 26

## 2025-03-20 PROCEDURE — 74177 CT ABD & PELVIS W/CONTRAST: CPT | Mod: 26

## 2025-03-20 PROCEDURE — 93306 TTE W/DOPPLER COMPLETE: CPT | Mod: 26

## 2025-03-20 PROCEDURE — 71260 CT THORAX DX C+: CPT

## 2025-03-20 PROCEDURE — 74177 CT ABD & PELVIS W/CONTRAST: CPT

## 2025-03-25 ENCOUNTER — LABORATORY RESULT (OUTPATIENT)
Age: 60
End: 2025-03-25

## 2025-03-26 ENCOUNTER — APPOINTMENT (OUTPATIENT)
Dept: INTERVENTIONAL RADIOLOGY/VASCULAR | Facility: CLINIC | Age: 60
End: 2025-03-26
Payer: COMMERCIAL

## 2025-03-26 VITALS — WEIGHT: 185 LBS | BODY MASS INDEX: 29.03 KG/M2 | HEIGHT: 67 IN

## 2025-03-26 PROCEDURE — 99205 OFFICE O/P NEW HI 60 MIN: CPT | Mod: 95

## 2025-03-27 ENCOUNTER — RESULT REVIEW (OUTPATIENT)
Age: 60
End: 2025-03-27

## 2025-03-27 ENCOUNTER — OUTPATIENT (OUTPATIENT)
Dept: OUTPATIENT SERVICES | Facility: HOSPITAL | Age: 60
LOS: 1 days | End: 2025-03-27

## 2025-03-27 VITALS
HEART RATE: 102 BPM | DIASTOLIC BLOOD PRESSURE: 66 MMHG | HEIGHT: 67 IN | SYSTOLIC BLOOD PRESSURE: 113 MMHG | OXYGEN SATURATION: 98 % | WEIGHT: 184.09 LBS | RESPIRATION RATE: 18 BRPM | TEMPERATURE: 97 F

## 2025-03-27 VITALS
OXYGEN SATURATION: 98 % | RESPIRATION RATE: 16 BRPM | HEART RATE: 88 BPM | DIASTOLIC BLOOD PRESSURE: 84 MMHG | SYSTOLIC BLOOD PRESSURE: 140 MMHG

## 2025-03-27 DIAGNOSIS — C18.9 MALIGNANT NEOPLASM OF COLON, UNSPECIFIED: ICD-10-CM

## 2025-03-27 DIAGNOSIS — Z90.49 ACQUIRED ABSENCE OF OTHER SPECIFIED PARTS OF DIGESTIVE TRACT: Chronic | ICD-10-CM

## 2025-03-27 DIAGNOSIS — Z98.890 OTHER SPECIFIED POSTPROCEDURAL STATES: Chronic | ICD-10-CM

## 2025-03-27 DIAGNOSIS — K76.9 LIVER DISEASE, UNSPECIFIED: ICD-10-CM

## 2025-03-27 DIAGNOSIS — Z85.038 PERSONAL HISTORY OF OTHER MALIGNANT NEOPLASM OF LARGE INTESTINE: ICD-10-CM

## 2025-03-27 DIAGNOSIS — R16.0 HEPATOMEGALY, NOT ELSEWHERE CLASSIFIED: ICD-10-CM

## 2025-03-27 PROCEDURE — 88307 TISSUE EXAM BY PATHOLOGIST: CPT | Mod: 26

## 2025-03-27 PROCEDURE — 77012 CT SCAN FOR NEEDLE BIOPSY: CPT | Mod: 26

## 2025-03-27 PROCEDURE — 88173 CYTOPATH EVAL FNA REPORT: CPT | Mod: 26

## 2025-03-27 PROCEDURE — 47000 NEEDLE BIOPSY OF LIVER PERQ: CPT

## 2025-03-27 PROCEDURE — 88342 IMHCHEM/IMCYTCHM 1ST ANTB: CPT | Mod: 26

## 2025-03-27 PROCEDURE — 88341 IMHCHEM/IMCYTCHM EA ADD ANTB: CPT | Mod: 26

## 2025-03-27 RX ORDER — HYDROMORPHONE/SOD CHLOR,ISO/PF 2 MG/10 ML
0.5 SYRINGE (ML) INJECTION
Refills: 0 | Status: DISCONTINUED | OUTPATIENT
Start: 2025-03-27 | End: 2025-03-27

## 2025-03-27 RX ORDER — ONDANSETRON HCL/PF 4 MG/2 ML
4 VIAL (ML) INJECTION ONCE
Refills: 0 | Status: ACTIVE | OUTPATIENT
Start: 2025-03-27 | End: 2026-02-23

## 2025-03-27 RX ORDER — ACETAMINOPHEN 500 MG/5ML
650 LIQUID (ML) ORAL ONCE
Refills: 0 | Status: ACTIVE | OUTPATIENT
Start: 2025-03-27 | End: 2026-02-23

## 2025-03-27 NOTE — PROCEDURE NOTE - PROCEDURE FINDINGS AND DETAILS
CT localized segment 8 biopsy - 5 18G cores on 5 passes. Adeno ca confirmed on cytopath. 4cores given to ctyopath for research protocol. Patient extubated on procedure table. Four hour recovery required before d/c
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
## 2025-03-27 NOTE — PRE PROCEDURE NOTE - PRE PROCEDURE EVALUATION
Patient seen and prior scans reviewed.  Patient is a candidatew for CT Guided mass biopsy 1.5 cm target

## 2025-03-27 NOTE — ASU PREOP CHECKLIST - NS PREOP CHK HIBICLENS NA
Patient Specific Counseling (Will Not Stick From Patient To Patient): The best treatment for this is anti-histamine but will try some hydrocortisone and see if it will calm the itch at all.  He is to take Allegra 180mg, BID, and Pepcid AC, BID and RTC in one week. Detail Level: Zone N/A Detail Level: Generalized Detail Level: Simple Detail Level: Detailed

## 2025-04-02 ENCOUNTER — APPOINTMENT (OUTPATIENT)
Dept: HEMATOLOGY ONCOLOGY | Facility: CLINIC | Age: 60
End: 2025-04-02
Payer: COMMERCIAL

## 2025-04-02 ENCOUNTER — NON-APPOINTMENT (OUTPATIENT)
Age: 60
End: 2025-04-02

## 2025-04-02 ENCOUNTER — APPOINTMENT (OUTPATIENT)
Dept: INFUSION THERAPY | Facility: HOSPITAL | Age: 60
End: 2025-04-02

## 2025-04-02 VITALS
HEART RATE: 97 BPM | HEIGHT: 67 IN | WEIGHT: 182.98 LBS | BODY MASS INDEX: 28.72 KG/M2 | SYSTOLIC BLOOD PRESSURE: 115 MMHG | OXYGEN SATURATION: 95 % | TEMPERATURE: 97.2 F | DIASTOLIC BLOOD PRESSURE: 79 MMHG | RESPIRATION RATE: 18 BRPM

## 2025-04-02 PROCEDURE — G2211 COMPLEX E/M VISIT ADD ON: CPT | Mod: NC

## 2025-04-02 PROCEDURE — 99215 OFFICE O/P EST HI 40 MIN: CPT

## 2025-04-03 ENCOUNTER — APPOINTMENT (OUTPATIENT)
Dept: HEMATOLOGY ONCOLOGY | Facility: CLINIC | Age: 60
End: 2025-04-03
Payer: COMMERCIAL

## 2025-04-03 ENCOUNTER — NON-APPOINTMENT (OUTPATIENT)
Age: 60
End: 2025-04-03

## 2025-04-03 VITALS
RESPIRATION RATE: 16 BRPM | DIASTOLIC BLOOD PRESSURE: 84 MMHG | HEART RATE: 79 BPM | TEMPERATURE: 98.9 F | BODY MASS INDEX: 28.72 KG/M2 | OXYGEN SATURATION: 98 % | HEIGHT: 67 IN | SYSTOLIC BLOOD PRESSURE: 132 MMHG | WEIGHT: 182.98 LBS

## 2025-04-03 PROCEDURE — G2211 COMPLEX E/M VISIT ADD ON: CPT | Mod: NC

## 2025-04-03 PROCEDURE — 99214 OFFICE O/P EST MOD 30 MIN: CPT

## 2025-04-04 LAB — NON-GYNECOLOGICAL CYTOLOGY STUDY: SIGNIFICANT CHANGE UP

## 2025-04-05 DIAGNOSIS — Z85.038 PERSONAL HISTORY OF OTHER MALIGNANT NEOPLASM OF LARGE INTESTINE: ICD-10-CM

## 2025-04-05 DIAGNOSIS — C18.9 MALIGNANT NEOPLASM OF COLON, UNSPECIFIED: ICD-10-CM

## 2025-04-09 ENCOUNTER — NON-APPOINTMENT (OUTPATIENT)
Age: 60
End: 2025-04-09

## 2025-04-09 ENCOUNTER — APPOINTMENT (OUTPATIENT)
Dept: HEMATOLOGY ONCOLOGY | Facility: CLINIC | Age: 60
End: 2025-04-09
Payer: COMMERCIAL

## 2025-04-09 VITALS
OXYGEN SATURATION: 98 % | WEIGHT: 182.98 LBS | HEIGHT: 67 IN | TEMPERATURE: 97.2 F | SYSTOLIC BLOOD PRESSURE: 122 MMHG | DIASTOLIC BLOOD PRESSURE: 76 MMHG | BODY MASS INDEX: 28.72 KG/M2 | HEART RATE: 81 BPM | RESPIRATION RATE: 16 BRPM

## 2025-04-09 LAB — POINT SOLID NEXT-GENERATION SEQUENCING PANEL FINAL REPORT: SIGNIFICANT CHANGE UP

## 2025-04-09 PROCEDURE — 93010 ELECTROCARDIOGRAM REPORT: CPT

## 2025-04-09 PROCEDURE — 99214 OFFICE O/P EST MOD 30 MIN: CPT

## 2025-04-09 PROCEDURE — G2211 COMPLEX E/M VISIT ADD ON: CPT | Mod: NC

## 2025-04-14 ENCOUNTER — LABORATORY RESULT (OUTPATIENT)
Age: 60
End: 2025-04-14

## 2025-04-16 ENCOUNTER — NON-APPOINTMENT (OUTPATIENT)
Age: 60
End: 2025-04-16

## 2025-04-16 ENCOUNTER — RESULT REVIEW (OUTPATIENT)
Age: 60
End: 2025-04-16

## 2025-04-16 ENCOUNTER — APPOINTMENT (OUTPATIENT)
Dept: HEMATOLOGY ONCOLOGY | Facility: CLINIC | Age: 60
End: 2025-04-16
Payer: SUBSIDIZED

## 2025-04-16 ENCOUNTER — APPOINTMENT (OUTPATIENT)
Dept: INFUSION THERAPY | Facility: HOSPITAL | Age: 60
End: 2025-04-16

## 2025-04-16 VITALS
TEMPERATURE: 97.2 F | BODY MASS INDEX: 27.97 KG/M2 | DIASTOLIC BLOOD PRESSURE: 77 MMHG | OXYGEN SATURATION: 98 % | HEART RATE: 82 BPM | RESPIRATION RATE: 16 BRPM | SYSTOLIC BLOOD PRESSURE: 121 MMHG | WEIGHT: 178.57 LBS

## 2025-04-16 LAB
ALBUMIN SERPL ELPH-MCNC: 3.9 G/DL — SIGNIFICANT CHANGE UP (ref 3.3–5)
ALP SERPL-CCNC: 121 U/L — HIGH (ref 40–120)
ALT FLD-CCNC: 91 U/L — HIGH (ref 10–45)
ANION GAP SERPL CALC-SCNC: 12 MMOL/L — SIGNIFICANT CHANGE UP (ref 5–17)
AST SERPL-CCNC: 42 U/L — HIGH (ref 10–40)
BILIRUB SERPL-MCNC: 1 MG/DL — SIGNIFICANT CHANGE UP (ref 0.2–1.2)
BUN SERPL-MCNC: 16 MG/DL — SIGNIFICANT CHANGE UP (ref 7–23)
CALCIUM SERPL-MCNC: 9.1 MG/DL — SIGNIFICANT CHANGE UP (ref 8.4–10.5)
CHLORIDE SERPL-SCNC: 102 MMOL/L — SIGNIFICANT CHANGE UP (ref 96–108)
CO2 SERPL-SCNC: 24 MMOL/L — SIGNIFICANT CHANGE UP (ref 22–31)
CREAT SERPL-MCNC: 0.69 MG/DL — SIGNIFICANT CHANGE UP (ref 0.5–1.3)
EGFR: 107 ML/MIN/1.73M2 — SIGNIFICANT CHANGE UP
EGFR: 107 ML/MIN/1.73M2 — SIGNIFICANT CHANGE UP
GLUCOSE SERPL-MCNC: 150 MG/DL — HIGH (ref 70–99)
POTASSIUM SERPL-MCNC: 4.2 MMOL/L — SIGNIFICANT CHANGE UP (ref 3.5–5.3)
POTASSIUM SERPL-SCNC: 4.2 MMOL/L — SIGNIFICANT CHANGE UP (ref 3.5–5.3)
PROT SERPL-MCNC: 5.9 G/DL — LOW (ref 6–8.3)
SODIUM SERPL-SCNC: 139 MMOL/L — SIGNIFICANT CHANGE UP (ref 135–145)

## 2025-04-16 PROCEDURE — G2211 COMPLEX E/M VISIT ADD ON: CPT | Mod: NC

## 2025-04-16 PROCEDURE — 99214 OFFICE O/P EST MOD 30 MIN: CPT

## 2025-04-17 ENCOUNTER — NON-APPOINTMENT (OUTPATIENT)
Age: 60
End: 2025-04-17

## 2025-04-17 ENCOUNTER — APPOINTMENT (OUTPATIENT)
Dept: HEMATOLOGY ONCOLOGY | Facility: CLINIC | Age: 60
End: 2025-04-17
Payer: COMMERCIAL

## 2025-04-17 VITALS
RESPIRATION RATE: 16 BRPM | DIASTOLIC BLOOD PRESSURE: 68 MMHG | OXYGEN SATURATION: 98 % | TEMPERATURE: 97.3 F | BODY MASS INDEX: 27.97 KG/M2 | HEART RATE: 99 BPM | SYSTOLIC BLOOD PRESSURE: 103 MMHG | WEIGHT: 178.55 LBS

## 2025-04-17 PROCEDURE — 99214 OFFICE O/P EST MOD 30 MIN: CPT

## 2025-04-17 PROCEDURE — G2211 COMPLEX E/M VISIT ADD ON: CPT | Mod: NC

## 2025-04-22 ENCOUNTER — LABORATORY RESULT (OUTPATIENT)
Age: 60
End: 2025-04-22

## 2025-04-22 LAB
BASOPHILS # BLD AUTO: 0.02 K/UL
BASOPHILS NFR BLD AUTO: 0.4 %
EOSINOPHIL # BLD AUTO: 0.19 K/UL
EOSINOPHIL NFR BLD AUTO: 3.7 %
HCT VFR BLD CALC: 37.3 %
HGB BLD-MCNC: 13.2 G/DL
IMM GRANULOCYTES NFR BLD AUTO: 0.2 %
LYMPHOCYTES # BLD AUTO: 0.9 K/UL
LYMPHOCYTES NFR BLD AUTO: 17.5 %
MAN DIFF?: NORMAL
MCHC RBC-ENTMCNC: 31.4 PG
MCHC RBC-ENTMCNC: 35.4 G/DL
MCV RBC AUTO: 88.8 FL
MONOCYTES # BLD AUTO: 0.57 K/UL
MONOCYTES NFR BLD AUTO: 11.1 %
NEUTROPHILS # BLD AUTO: 3.46 K/UL
NEUTROPHILS NFR BLD AUTO: 67.1 %
PLATELET # BLD AUTO: 116 K/UL
RBC # BLD: 4.2 M/UL
RBC # FLD: 14.5 %
WBC # FLD AUTO: 5.15 K/UL

## 2025-04-23 ENCOUNTER — APPOINTMENT (OUTPATIENT)
Dept: HEMATOLOGY ONCOLOGY | Facility: CLINIC | Age: 60
End: 2025-04-23
Payer: COMMERCIAL

## 2025-04-23 ENCOUNTER — NON-APPOINTMENT (OUTPATIENT)
Age: 60
End: 2025-04-23

## 2025-04-23 VITALS
RESPIRATION RATE: 15 BRPM | OXYGEN SATURATION: 96 % | BODY MASS INDEX: 28.03 KG/M2 | HEART RATE: 89 BPM | WEIGHT: 178.57 LBS | TEMPERATURE: 97.7 F | SYSTOLIC BLOOD PRESSURE: 109 MMHG | HEIGHT: 67 IN | DIASTOLIC BLOOD PRESSURE: 70 MMHG

## 2025-04-23 DIAGNOSIS — C78.7 MALIGNANT NEOPLASM OF COLON, UNSPECIFIED: ICD-10-CM

## 2025-04-23 DIAGNOSIS — C18.9 MALIGNANT NEOPLASM OF COLON, UNSPECIFIED: ICD-10-CM

## 2025-04-23 PROCEDURE — 93010 ELECTROCARDIOGRAM REPORT: CPT

## 2025-04-23 PROCEDURE — 99214 OFFICE O/P EST MOD 30 MIN: CPT

## 2025-04-23 PROCEDURE — 93000 ELECTROCARDIOGRAM COMPLETE: CPT

## 2025-04-23 PROCEDURE — G2211 COMPLEX E/M VISIT ADD ON: CPT | Mod: NC

## 2025-04-28 ENCOUNTER — LABORATORY RESULT (OUTPATIENT)
Age: 60
End: 2025-04-28

## 2025-04-30 ENCOUNTER — APPOINTMENT (OUTPATIENT)
Dept: HEMATOLOGY ONCOLOGY | Facility: CLINIC | Age: 60
End: 2025-04-30
Payer: COMMERCIAL

## 2025-04-30 ENCOUNTER — NON-APPOINTMENT (OUTPATIENT)
Age: 60
End: 2025-04-30

## 2025-04-30 ENCOUNTER — OUTPATIENT (OUTPATIENT)
Dept: OUTPATIENT SERVICES | Facility: HOSPITAL | Age: 60
LOS: 1 days | Discharge: HOME | End: 2025-04-30
Payer: COMMERCIAL

## 2025-04-30 ENCOUNTER — RESULT CHARGE (OUTPATIENT)
Age: 60
End: 2025-04-30

## 2025-04-30 VITALS
HEART RATE: 95 BPM | RESPIRATION RATE: 16 BRPM | TEMPERATURE: 98.2 F | OXYGEN SATURATION: 98 % | SYSTOLIC BLOOD PRESSURE: 126 MMHG | HEIGHT: 67 IN | DIASTOLIC BLOOD PRESSURE: 77 MMHG | BODY MASS INDEX: 28.55 KG/M2 | WEIGHT: 181.88 LBS

## 2025-04-30 DIAGNOSIS — Z90.49 ACQUIRED ABSENCE OF OTHER SPECIFIED PARTS OF DIGESTIVE TRACT: Chronic | ICD-10-CM

## 2025-04-30 DIAGNOSIS — C18.9 MALIGNANT NEOPLASM OF COLON, UNSPECIFIED: ICD-10-CM

## 2025-04-30 DIAGNOSIS — Z98.890 OTHER SPECIFIED POSTPROCEDURAL STATES: Chronic | ICD-10-CM

## 2025-04-30 PROCEDURE — 99214 OFFICE O/P EST MOD 30 MIN: CPT

## 2025-04-30 PROCEDURE — G2211 COMPLEX E/M VISIT ADD ON: CPT | Mod: NC

## 2025-04-30 PROCEDURE — 93000 ELECTROCARDIOGRAM COMPLETE: CPT

## 2025-04-30 PROCEDURE — 93010 ELECTROCARDIOGRAM REPORT: CPT

## 2025-05-12 ENCOUNTER — LABORATORY RESULT (OUTPATIENT)
Age: 60
End: 2025-05-12

## 2025-05-13 ENCOUNTER — APPOINTMENT (OUTPATIENT)
Dept: HEMATOLOGY ONCOLOGY | Facility: CLINIC | Age: 60
End: 2025-05-13
Payer: COMMERCIAL

## 2025-05-13 VITALS
OXYGEN SATURATION: 97 % | SYSTOLIC BLOOD PRESSURE: 122 MMHG | HEIGHT: 67 IN | RESPIRATION RATE: 14 BRPM | BODY MASS INDEX: 28.03 KG/M2 | HEART RATE: 70 BPM | WEIGHT: 178.57 LBS | DIASTOLIC BLOOD PRESSURE: 78 MMHG | TEMPERATURE: 97.7 F

## 2025-05-13 DIAGNOSIS — C78.7 MALIGNANT NEOPLASM OF COLON, UNSPECIFIED: ICD-10-CM

## 2025-05-13 DIAGNOSIS — C18.9 MALIGNANT NEOPLASM OF COLON, UNSPECIFIED: ICD-10-CM

## 2025-05-13 PROCEDURE — 93000 ELECTROCARDIOGRAM COMPLETE: CPT

## 2025-05-13 PROCEDURE — 93010 ELECTROCARDIOGRAM REPORT: CPT

## 2025-05-13 PROCEDURE — G2211 COMPLEX E/M VISIT ADD ON: CPT | Mod: NC

## 2025-05-13 PROCEDURE — 99214 OFFICE O/P EST MOD 30 MIN: CPT

## 2025-05-22 ENCOUNTER — APPOINTMENT (OUTPATIENT)
Dept: CT IMAGING | Facility: CLINIC | Age: 60
End: 2025-05-22
Payer: COMMERCIAL

## 2025-05-22 ENCOUNTER — OUTPATIENT (OUTPATIENT)
Dept: OUTPATIENT SERVICES | Facility: HOSPITAL | Age: 60
LOS: 1 days | End: 2025-05-22
Payer: COMMERCIAL

## 2025-05-22 DIAGNOSIS — Z90.49 ACQUIRED ABSENCE OF OTHER SPECIFIED PARTS OF DIGESTIVE TRACT: Chronic | ICD-10-CM

## 2025-05-22 DIAGNOSIS — Z00.8 ENCOUNTER FOR OTHER GENERAL EXAMINATION: ICD-10-CM

## 2025-05-22 DIAGNOSIS — Z98.890 OTHER SPECIFIED POSTPROCEDURAL STATES: Chronic | ICD-10-CM

## 2025-05-22 DIAGNOSIS — C18.9 MALIGNANT NEOPLASM OF COLON, UNSPECIFIED: ICD-10-CM

## 2025-05-22 PROCEDURE — 74177 CT ABD & PELVIS W/CONTRAST: CPT | Mod: 26

## 2025-05-22 PROCEDURE — 74177 CT ABD & PELVIS W/CONTRAST: CPT

## 2025-05-22 PROCEDURE — 71260 CT THORAX DX C+: CPT | Mod: 26

## 2025-05-22 PROCEDURE — 71260 CT THORAX DX C+: CPT

## 2025-05-27 ENCOUNTER — LABORATORY RESULT (OUTPATIENT)
Age: 60
End: 2025-05-27

## 2025-05-30 ENCOUNTER — NON-APPOINTMENT (OUTPATIENT)
Age: 60
End: 2025-05-30

## 2025-05-30 ENCOUNTER — APPOINTMENT (OUTPATIENT)
Dept: HEMATOLOGY ONCOLOGY | Facility: CLINIC | Age: 60
End: 2025-05-30
Payer: COMMERCIAL

## 2025-05-30 VITALS
SYSTOLIC BLOOD PRESSURE: 118 MMHG | HEART RATE: 72 BPM | DIASTOLIC BLOOD PRESSURE: 78 MMHG | OXYGEN SATURATION: 98 % | TEMPERATURE: 97.1 F | RESPIRATION RATE: 14 BRPM | HEIGHT: 67 IN | WEIGHT: 178.57 LBS | BODY MASS INDEX: 28.03 KG/M2

## 2025-05-30 DIAGNOSIS — C78.7 MALIGNANT NEOPLASM OF COLON, UNSPECIFIED: ICD-10-CM

## 2025-05-30 DIAGNOSIS — C18.9 MALIGNANT NEOPLASM OF COLON, UNSPECIFIED: ICD-10-CM

## 2025-05-30 PROCEDURE — 99215 OFFICE O/P EST HI 40 MIN: CPT

## 2025-05-30 PROCEDURE — 93010 ELECTROCARDIOGRAM REPORT: CPT

## 2025-05-30 PROCEDURE — G2211 COMPLEX E/M VISIT ADD ON: CPT | Mod: NC

## 2025-05-30 RX ORDER — TRIFLURIDINE AND TIPIRACIL 20; 8.19 MG/1; MG/1
20-8.19 TABLET, FILM COATED ORAL
Qty: 50 | Refills: 1 | Status: ACTIVE | COMMUNITY
Start: 2025-05-30 | End: 1900-01-01

## 2025-06-02 ENCOUNTER — RESULT CHARGE (OUTPATIENT)
Age: 60
End: 2025-06-02
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
## 2025-06-04 ENCOUNTER — NON-APPOINTMENT (OUTPATIENT)
Age: 60
End: 2025-06-04

## 2025-06-11 ENCOUNTER — APPOINTMENT (OUTPATIENT)
Dept: HEMATOLOGY ONCOLOGY | Facility: CLINIC | Age: 60
End: 2025-06-11

## 2025-06-12 ENCOUNTER — APPOINTMENT (OUTPATIENT)
Dept: INFUSION THERAPY | Facility: HOSPITAL | Age: 60
End: 2025-06-12

## 2025-06-12 DIAGNOSIS — R11.2 NAUSEA WITH VOMITING, UNSPECIFIED: ICD-10-CM

## 2025-06-12 DIAGNOSIS — Z51.11 ENCOUNTER FOR ANTINEOPLASTIC CHEMOTHERAPY: ICD-10-CM

## 2025-06-18 ENCOUNTER — APPOINTMENT (OUTPATIENT)
Dept: RADIATION ONCOLOGY | Facility: CLINIC | Age: 60
End: 2025-06-18
Payer: COMMERCIAL

## 2025-06-18 VITALS
HEART RATE: 53 BPM | RESPIRATION RATE: 19 BRPM | HEIGHT: 67 IN | WEIGHT: 180.32 LBS | SYSTOLIC BLOOD PRESSURE: 137 MMHG | DIASTOLIC BLOOD PRESSURE: 73 MMHG | OXYGEN SATURATION: 97 % | TEMPERATURE: 96.6 F | BODY MASS INDEX: 28.3 KG/M2

## 2025-06-18 PROCEDURE — 99215 OFFICE O/P EST HI 40 MIN: CPT

## 2025-06-18 PROCEDURE — 77334 RADIATION TREATMENT AID(S): CPT | Mod: 26

## 2025-06-23 ENCOUNTER — LABORATORY RESULT (OUTPATIENT)
Age: 60
End: 2025-06-23

## 2025-06-25 ENCOUNTER — NON-APPOINTMENT (OUTPATIENT)
Age: 60
End: 2025-06-25

## 2025-06-25 ENCOUNTER — APPOINTMENT (OUTPATIENT)
Dept: HEMATOLOGY ONCOLOGY | Facility: CLINIC | Age: 60
End: 2025-06-25
Payer: COMMERCIAL

## 2025-06-25 ENCOUNTER — RESULT REVIEW (OUTPATIENT)
Age: 60
End: 2025-06-25

## 2025-06-25 ENCOUNTER — APPOINTMENT (OUTPATIENT)
Dept: INFUSION THERAPY | Facility: HOSPITAL | Age: 60
End: 2025-06-25

## 2025-06-25 LAB
ALBUMIN SERPL ELPH-MCNC: 3.6 G/DL — SIGNIFICANT CHANGE UP (ref 3.3–5)
ALP SERPL-CCNC: 91 U/L — SIGNIFICANT CHANGE UP (ref 40–120)
ALT FLD-CCNC: 41 U/L — SIGNIFICANT CHANGE UP (ref 10–45)
ANION GAP SERPL CALC-SCNC: 11 MMOL/L — SIGNIFICANT CHANGE UP (ref 5–17)
AST SERPL-CCNC: 35 U/L — SIGNIFICANT CHANGE UP (ref 10–40)
BASOPHILS # BLD AUTO: 0.01 K/UL — SIGNIFICANT CHANGE UP (ref 0–0.2)
BASOPHILS NFR BLD AUTO: 0.3 % — SIGNIFICANT CHANGE UP (ref 0–2)
BILIRUB SERPL-MCNC: 1.1 MG/DL — SIGNIFICANT CHANGE UP (ref 0.2–1.2)
BUN SERPL-MCNC: 13 MG/DL — SIGNIFICANT CHANGE UP (ref 7–23)
CALCIUM SERPL-MCNC: 8.8 MG/DL — SIGNIFICANT CHANGE UP (ref 8.4–10.5)
CEA SERPL-MCNC: 110 NG/ML — HIGH (ref 0–3.8)
CHLORIDE SERPL-SCNC: 103 MMOL/L — SIGNIFICANT CHANGE UP (ref 96–108)
CO2 SERPL-SCNC: 25 MMOL/L — SIGNIFICANT CHANGE UP (ref 22–31)
CREAT SERPL-MCNC: 0.61 MG/DL — SIGNIFICANT CHANGE UP (ref 0.5–1.3)
EGFR: 111 ML/MIN/1.73M2 — SIGNIFICANT CHANGE UP
EGFR: 111 ML/MIN/1.73M2 — SIGNIFICANT CHANGE UP
EOSINOPHIL # BLD AUTO: 0.1 K/UL — SIGNIFICANT CHANGE UP (ref 0–0.5)
EOSINOPHIL NFR BLD AUTO: 2.7 % — SIGNIFICANT CHANGE UP (ref 0–6)
GLUCOSE SERPL-MCNC: 231 MG/DL — HIGH (ref 70–99)
HCT VFR BLD CALC: 31.8 % — LOW (ref 39–50)
HGB BLD-MCNC: 11 G/DL — LOW (ref 13–17)
IMM GRANULOCYTES NFR BLD AUTO: 0.5 % — SIGNIFICANT CHANGE UP (ref 0–0.9)
LDH SERPL L TO P-CCNC: 248 U/L — HIGH (ref 50–242)
LYMPHOCYTES # BLD AUTO: 0.58 K/UL — LOW (ref 1–3.3)
LYMPHOCYTES # BLD AUTO: 15.7 % — SIGNIFICANT CHANGE UP (ref 13–44)
MAGNESIUM SERPL-MCNC: 1.8 MG/DL — SIGNIFICANT CHANGE UP (ref 1.6–2.6)
MCHC RBC-ENTMCNC: 33.5 PG — SIGNIFICANT CHANGE UP (ref 27–34)
MCHC RBC-ENTMCNC: 34.6 G/DL — SIGNIFICANT CHANGE UP (ref 32–36)
MCV RBC AUTO: 97 FL — SIGNIFICANT CHANGE UP (ref 80–100)
MONOCYTES # BLD AUTO: 0.21 K/UL — SIGNIFICANT CHANGE UP (ref 0–0.9)
MONOCYTES NFR BLD AUTO: 5.7 % — SIGNIFICANT CHANGE UP (ref 2–14)
NEUTROPHILS # BLD AUTO: 2.78 K/UL — SIGNIFICANT CHANGE UP (ref 1.8–7.4)
NEUTROPHILS NFR BLD AUTO: 75.1 % — SIGNIFICANT CHANGE UP (ref 43–77)
NRBC BLD AUTO-RTO: 0 /100 WBCS — SIGNIFICANT CHANGE UP (ref 0–0)
PLATELET # BLD AUTO: 94 K/UL — LOW (ref 150–400)
POTASSIUM SERPL-MCNC: 3.8 MMOL/L — SIGNIFICANT CHANGE UP (ref 3.5–5.3)
POTASSIUM SERPL-SCNC: 3.8 MMOL/L — SIGNIFICANT CHANGE UP (ref 3.5–5.3)
PROT SERPL-MCNC: 5.5 G/DL — LOW (ref 6–8.3)
RBC # BLD: 3.28 M/UL — LOW (ref 4.2–5.8)
RBC # FLD: 14.7 % — HIGH (ref 10.3–14.5)
SODIUM SERPL-SCNC: 139 MMOL/L — SIGNIFICANT CHANGE UP (ref 135–145)
WBC # BLD: 3.7 K/UL — LOW (ref 3.8–10.5)
WBC # FLD AUTO: 3.7 K/UL — LOW (ref 3.8–10.5)

## 2025-06-25 PROCEDURE — 99214 OFFICE O/P EST MOD 30 MIN: CPT

## 2025-06-25 PROCEDURE — G2211 COMPLEX E/M VISIT ADD ON: CPT | Mod: NC

## 2025-06-28 LAB — APTT BLD: 27 SEC

## 2025-07-03 ENCOUNTER — NON-APPOINTMENT (OUTPATIENT)
Age: 60
End: 2025-07-03

## 2025-07-08 ENCOUNTER — OUTPATIENT (OUTPATIENT)
Dept: OUTPATIENT SERVICES | Facility: HOSPITAL | Age: 60
LOS: 1 days | Discharge: HOME | End: 2025-07-08

## 2025-07-08 DIAGNOSIS — Z98.890 OTHER SPECIFIED POSTPROCEDURAL STATES: Chronic | ICD-10-CM

## 2025-07-08 DIAGNOSIS — C18.9 MALIGNANT NEOPLASM OF COLON, UNSPECIFIED: ICD-10-CM

## 2025-07-08 DIAGNOSIS — Z90.49 ACQUIRED ABSENCE OF OTHER SPECIFIED PARTS OF DIGESTIVE TRACT: Chronic | ICD-10-CM

## 2025-07-09 ENCOUNTER — NON-APPOINTMENT (OUTPATIENT)
Age: 60
End: 2025-07-09

## 2025-07-09 ENCOUNTER — APPOINTMENT (OUTPATIENT)
Dept: HEMATOLOGY ONCOLOGY | Facility: CLINIC | Age: 60
End: 2025-07-09

## 2025-07-11 ENCOUNTER — LABORATORY RESULT (OUTPATIENT)
Age: 60
End: 2025-07-11

## 2025-07-14 LAB
ALBUMIN SERPL ELPH-MCNC: 3.7 G/DL
ALP BLD-CCNC: 106 U/L
ALT SERPL-CCNC: 44 U/L
ANION GAP SERPL CALC-SCNC: 16 MMOL/L
AST SERPL-CCNC: 39 U/L
BASOPHILS # BLD AUTO: 0.02 K/UL
BASOPHILS NFR BLD AUTO: 0.9 %
BILIRUB SERPL-MCNC: 1.3 MG/DL
BUN SERPL-MCNC: 12 MG/DL
CALCIUM SERPL-MCNC: 9.3 MG/DL
CHLORIDE SERPL-SCNC: 105 MMOL/L
CO2 SERPL-SCNC: 22 MMOL/L
CREAT SERPL-MCNC: 0.78 MG/DL
EGFRCR SERPLBLD CKD-EPI 2021: 103 ML/MIN/1.73M2
EOSINOPHIL # BLD AUTO: 0.05 K/UL
EOSINOPHIL NFR BLD AUTO: 1.8 %
GLUCOSE SERPL-MCNC: 63 MG/DL
HCT VFR BLD CALC: 37.8 %
HGB BLD-MCNC: 12.7 G/DL
LYMPHOCYTES # BLD AUTO: 0.62 K/UL
LYMPHOCYTES NFR BLD AUTO: 24.8 %
MAN DIFF?: NORMAL
MCHC RBC-ENTMCNC: 33.5 PG
MCHC RBC-ENTMCNC: 33.6 G/DL
MCV RBC AUTO: 99.7 FL
MONOCYTES # BLD AUTO: 0.24 K/UL
MONOCYTES NFR BLD AUTO: 9.7 %
NEUTROPHILS # BLD AUTO: 1.57 K/UL
NEUTROPHILS NFR BLD AUTO: 62.8 %
PLATELET # BLD AUTO: 104 K/UL
POTASSIUM SERPL-SCNC: 4.4 MMOL/L
PROT SERPL-MCNC: 5.7 G/DL
RBC # BLD: 3.79 M/UL
RBC # FLD: 16.4 %
SODIUM SERPL-SCNC: 143 MMOL/L
WBC # FLD AUTO: 2.5 K/UL

## 2025-07-15 ENCOUNTER — APPOINTMENT (OUTPATIENT)
Dept: HEMATOLOGY ONCOLOGY | Facility: CLINIC | Age: 60
End: 2025-07-15
Payer: COMMERCIAL

## 2025-07-15 ENCOUNTER — RESULT REVIEW (OUTPATIENT)
Age: 60
End: 2025-07-15

## 2025-07-15 ENCOUNTER — APPOINTMENT (OUTPATIENT)
Dept: INFUSION THERAPY | Facility: HOSPITAL | Age: 60
End: 2025-07-15

## 2025-07-15 LAB
ALBUMIN SERPL ELPH-MCNC: 3.7 G/DL — SIGNIFICANT CHANGE UP (ref 3.3–5)
ALP SERPL-CCNC: 89 U/L — SIGNIFICANT CHANGE UP (ref 40–120)
ALT FLD-CCNC: 51 U/L — HIGH (ref 10–45)
ANION GAP SERPL CALC-SCNC: 12 MMOL/L — SIGNIFICANT CHANGE UP (ref 5–17)
APPEARANCE UR: ABNORMAL
AST SERPL-CCNC: 48 U/L — HIGH (ref 10–40)
BACTERIA # UR AUTO: NEGATIVE /HPF — SIGNIFICANT CHANGE UP
BASOPHILS # BLD AUTO: 0.03 K/UL — SIGNIFICANT CHANGE UP (ref 0–0.2)
BASOPHILS NFR BLD AUTO: 0.8 % — SIGNIFICANT CHANGE UP (ref 0–2)
BILIRUB SERPL-MCNC: 0.8 MG/DL — SIGNIFICANT CHANGE UP (ref 0.2–1.2)
BILIRUB UR-MCNC: ABNORMAL
BUN SERPL-MCNC: 13 MG/DL — SIGNIFICANT CHANGE UP (ref 7–23)
CALCIUM SERPL-MCNC: 8.8 MG/DL — SIGNIFICANT CHANGE UP (ref 8.4–10.5)
CAST: 1 /LPF — SIGNIFICANT CHANGE UP (ref 0–4)
CEA SERPL-MCNC: 112 NG/ML — HIGH (ref 0–3.8)
CHLORIDE SERPL-SCNC: 105 MMOL/L — SIGNIFICANT CHANGE UP (ref 96–108)
CO2 SERPL-SCNC: 23 MMOL/L — SIGNIFICANT CHANGE UP (ref 22–31)
COD CRY URNS QL: PRESENT
COLOR SPEC: SIGNIFICANT CHANGE UP
CREAT ?TM UR-MCNC: 245 MG/DL — SIGNIFICANT CHANGE UP
CREAT SERPL-MCNC: 0.61 MG/DL — SIGNIFICANT CHANGE UP (ref 0.5–1.3)
DIFF PNL FLD: NEGATIVE — SIGNIFICANT CHANGE UP
EGFR: 111 ML/MIN/1.73M2 — SIGNIFICANT CHANGE UP
EGFR: 111 ML/MIN/1.73M2 — SIGNIFICANT CHANGE UP
EOSINOPHIL # BLD AUTO: 0.04 K/UL — SIGNIFICANT CHANGE UP (ref 0–0.5)
EOSINOPHIL NFR BLD AUTO: 1.1 % — SIGNIFICANT CHANGE UP (ref 0–6)
GLUCOSE SERPL-MCNC: 129 MG/DL — HIGH (ref 70–99)
GLUCOSE UR QL: NEGATIVE MG/DL — SIGNIFICANT CHANGE UP
HCT VFR BLD CALC: 36.1 % — LOW (ref 39–50)
HGB BLD-MCNC: 12.3 G/DL — LOW (ref 13–17)
IMM GRANULOCYTES NFR BLD AUTO: 0.3 % — SIGNIFICANT CHANGE UP (ref 0–0.9)
KETONES UR QL: ABNORMAL MG/DL
LDH SERPL L TO P-CCNC: 251 U/L — HIGH (ref 50–242)
LEUKOCYTE ESTERASE UR-ACNC: NEGATIVE — SIGNIFICANT CHANGE UP
LYMPHOCYTES # BLD AUTO: 0.58 K/UL — LOW (ref 1–3.3)
LYMPHOCYTES # BLD AUTO: 16.2 % — SIGNIFICANT CHANGE UP (ref 13–44)
MAGNESIUM SERPL-MCNC: 2.1 MG/DL — SIGNIFICANT CHANGE UP (ref 1.6–2.6)
MCHC RBC-ENTMCNC: 33.7 PG — SIGNIFICANT CHANGE UP (ref 27–34)
MCHC RBC-ENTMCNC: 34.1 G/DL — SIGNIFICANT CHANGE UP (ref 32–36)
MCV RBC AUTO: 98.9 FL — SIGNIFICANT CHANGE UP (ref 80–100)
MONOCYTES # BLD AUTO: 0.56 K/UL — SIGNIFICANT CHANGE UP (ref 0–0.9)
MONOCYTES NFR BLD AUTO: 15.6 % — HIGH (ref 2–14)
NEUTROPHILS # BLD AUTO: 2.36 K/UL — SIGNIFICANT CHANGE UP (ref 1.8–7.4)
NEUTROPHILS NFR BLD AUTO: 66 % — SIGNIFICANT CHANGE UP (ref 43–77)
NITRITE UR-MCNC: NEGATIVE — SIGNIFICANT CHANGE UP
NRBC BLD AUTO-RTO: 0 /100 WBCS — SIGNIFICANT CHANGE UP (ref 0–0)
PH UR: 6 — SIGNIFICANT CHANGE UP (ref 5–8)
PLATELET # BLD AUTO: 78 K/UL — LOW (ref 150–400)
POTASSIUM SERPL-MCNC: 3.9 MMOL/L — SIGNIFICANT CHANGE UP (ref 3.5–5.3)
POTASSIUM SERPL-SCNC: 3.9 MMOL/L — SIGNIFICANT CHANGE UP (ref 3.5–5.3)
PROT ?TM UR-MCNC: 33 MG/DL — HIGH (ref 0–12)
PROT SERPL-MCNC: 5.9 G/DL — LOW (ref 6–8.3)
PROT UR-MCNC: SIGNIFICANT CHANGE UP MG/DL
PROT/CREAT UR-RTO: 0.1 RATIO — SIGNIFICANT CHANGE UP (ref 0–0.2)
RBC # BLD: 3.65 M/UL — LOW (ref 4.2–5.8)
RBC # FLD: 15.9 % — HIGH (ref 10.3–14.5)
RBC CASTS # UR COMP ASSIST: 4 /HPF — SIGNIFICANT CHANGE UP (ref 0–4)
REVIEW: SIGNIFICANT CHANGE UP
SODIUM SERPL-SCNC: 141 MMOL/L — SIGNIFICANT CHANGE UP (ref 135–145)
SP GR SPEC: 1.02 — SIGNIFICANT CHANGE UP (ref 1–1.03)
SQUAMOUS # UR AUTO: 1 /HPF — SIGNIFICANT CHANGE UP (ref 0–5)
UROBILINOGEN FLD QL: 1 MG/DL — SIGNIFICANT CHANGE UP (ref 0.2–1)
WBC # BLD: 3.58 K/UL — LOW (ref 3.8–10.5)
WBC # FLD AUTO: 3.58 K/UL — LOW (ref 3.8–10.5)
WBC UR QL: 1 /HPF — SIGNIFICANT CHANGE UP (ref 0–5)

## 2025-07-15 PROCEDURE — 99214 OFFICE O/P EST MOD 30 MIN: CPT

## 2025-07-15 PROCEDURE — G2211 COMPLEX E/M VISIT ADD ON: CPT | Mod: NC

## 2025-07-16 DIAGNOSIS — Z51.11 ENCOUNTER FOR ANTINEOPLASTIC CHEMOTHERAPY: ICD-10-CM

## 2025-07-16 DIAGNOSIS — R11.2 NAUSEA WITH VOMITING, UNSPECIFIED: ICD-10-CM

## 2025-07-23 ENCOUNTER — APPOINTMENT (OUTPATIENT)
Dept: HEMATOLOGY ONCOLOGY | Facility: CLINIC | Age: 60
End: 2025-07-23

## 2025-07-29 ENCOUNTER — APPOINTMENT (OUTPATIENT)
Dept: HEMATOLOGY ONCOLOGY | Facility: CLINIC | Age: 60
End: 2025-07-29
Payer: COMMERCIAL

## 2025-07-29 ENCOUNTER — RESULT REVIEW (OUTPATIENT)
Age: 60
End: 2025-07-29

## 2025-07-29 ENCOUNTER — APPOINTMENT (OUTPATIENT)
Dept: INFUSION THERAPY | Facility: HOSPITAL | Age: 60
End: 2025-07-29

## 2025-07-29 LAB
ALBUMIN SERPL ELPH-MCNC: 3.8 G/DL — SIGNIFICANT CHANGE UP (ref 3.3–5)
ALP SERPL-CCNC: 88 U/L — SIGNIFICANT CHANGE UP (ref 40–120)
ALT FLD-CCNC: 47 U/L — HIGH (ref 10–45)
ANION GAP SERPL CALC-SCNC: 11 MMOL/L — SIGNIFICANT CHANGE UP (ref 5–17)
AST SERPL-CCNC: 40 U/L — SIGNIFICANT CHANGE UP (ref 10–40)
BASOPHILS # BLD AUTO: 0.01 K/UL — SIGNIFICANT CHANGE UP (ref 0–0.2)
BASOPHILS NFR BLD AUTO: 0.3 % — SIGNIFICANT CHANGE UP (ref 0–2)
BILIRUB SERPL-MCNC: 1.4 MG/DL — HIGH (ref 0.2–1.2)
BUN SERPL-MCNC: 19 MG/DL — SIGNIFICANT CHANGE UP (ref 7–23)
CALCIUM SERPL-MCNC: 9 MG/DL — SIGNIFICANT CHANGE UP (ref 8.4–10.5)
CEA SERPL-MCNC: 102 NG/ML — HIGH (ref 0–3.8)
CHLORIDE SERPL-SCNC: 103 MMOL/L — SIGNIFICANT CHANGE UP (ref 96–108)
CO2 SERPL-SCNC: 24 MMOL/L — SIGNIFICANT CHANGE UP (ref 22–31)
CREAT SERPL-MCNC: 0.6 MG/DL — SIGNIFICANT CHANGE UP (ref 0.5–1.3)
EGFR: 112 ML/MIN/1.73M2 — SIGNIFICANT CHANGE UP
EGFR: 112 ML/MIN/1.73M2 — SIGNIFICANT CHANGE UP
EOSINOPHIL # BLD AUTO: 0.02 K/UL — SIGNIFICANT CHANGE UP (ref 0–0.5)
EOSINOPHIL NFR BLD AUTO: 0.6 % — SIGNIFICANT CHANGE UP (ref 0–6)
GLUCOSE SERPL-MCNC: 169 MG/DL — HIGH (ref 70–99)
HCT VFR BLD CALC: 34.1 % — LOW (ref 39–50)
HGB BLD-MCNC: 11.8 G/DL — LOW (ref 13–17)
IMM GRANULOCYTES NFR BLD AUTO: 0.3 % — SIGNIFICANT CHANGE UP (ref 0–0.9)
LDH SERPL L TO P-CCNC: 232 U/L — SIGNIFICANT CHANGE UP (ref 50–242)
LYMPHOCYTES # BLD AUTO: 0.37 K/UL — LOW (ref 1–3.3)
LYMPHOCYTES # BLD AUTO: 10.5 % — LOW (ref 13–44)
MAGNESIUM SERPL-MCNC: 1.8 MG/DL — SIGNIFICANT CHANGE UP (ref 1.6–2.6)
MCHC RBC-ENTMCNC: 33.3 PG — SIGNIFICANT CHANGE UP (ref 27–34)
MCHC RBC-ENTMCNC: 34.6 G/DL — SIGNIFICANT CHANGE UP (ref 32–36)
MCV RBC AUTO: 96.3 FL — SIGNIFICANT CHANGE UP (ref 80–100)
MONOCYTES # BLD AUTO: 0.24 K/UL — SIGNIFICANT CHANGE UP (ref 0–0.9)
MONOCYTES NFR BLD AUTO: 6.8 % — SIGNIFICANT CHANGE UP (ref 2–14)
NEUTROPHILS # BLD AUTO: 2.88 K/UL — SIGNIFICANT CHANGE UP (ref 1.8–7.4)
NEUTROPHILS NFR BLD AUTO: 81.5 % — HIGH (ref 43–77)
NRBC BLD AUTO-RTO: 0 /100 WBCS — SIGNIFICANT CHANGE UP (ref 0–0)
PLATELET # BLD AUTO: 85 K/UL — LOW (ref 150–400)
POTASSIUM SERPL-MCNC: 3.7 MMOL/L — SIGNIFICANT CHANGE UP (ref 3.5–5.3)
POTASSIUM SERPL-SCNC: 3.7 MMOL/L — SIGNIFICANT CHANGE UP (ref 3.5–5.3)
PROT SERPL-MCNC: 5.7 G/DL — LOW (ref 6–8.3)
RBC # BLD: 3.54 M/UL — LOW (ref 4.2–5.8)
RBC # FLD: 14.1 % — SIGNIFICANT CHANGE UP (ref 10.3–14.5)
SODIUM SERPL-SCNC: 139 MMOL/L — SIGNIFICANT CHANGE UP (ref 135–145)
WBC # BLD: 3.53 K/UL — LOW (ref 3.8–10.5)
WBC # FLD AUTO: 3.53 K/UL — LOW (ref 3.8–10.5)

## 2025-07-29 PROCEDURE — G2211 COMPLEX E/M VISIT ADD ON: CPT | Mod: NC

## 2025-07-29 PROCEDURE — 99214 OFFICE O/P EST MOD 30 MIN: CPT

## 2025-08-12 ENCOUNTER — RESULT REVIEW (OUTPATIENT)
Age: 60
End: 2025-08-12

## 2025-08-12 ENCOUNTER — APPOINTMENT (OUTPATIENT)
Dept: HEMATOLOGY ONCOLOGY | Facility: CLINIC | Age: 60
End: 2025-08-12
Payer: COMMERCIAL

## 2025-08-12 ENCOUNTER — APPOINTMENT (OUTPATIENT)
Dept: INFUSION THERAPY | Facility: HOSPITAL | Age: 60
End: 2025-08-12

## 2025-08-12 DIAGNOSIS — C78.7 MALIGNANT NEOPLASM OF COLON, UNSPECIFIED: ICD-10-CM

## 2025-08-12 DIAGNOSIS — C18.9 MALIGNANT NEOPLASM OF COLON, UNSPECIFIED: ICD-10-CM

## 2025-08-12 LAB
ALBUMIN SERPL ELPH-MCNC: 3.9 G/DL — SIGNIFICANT CHANGE UP (ref 3.3–5)
ALP SERPL-CCNC: 101 U/L — SIGNIFICANT CHANGE UP (ref 40–120)
ALT FLD-CCNC: 71 U/L — HIGH (ref 10–45)
ANION GAP SERPL CALC-SCNC: 11 MMOL/L — SIGNIFICANT CHANGE UP (ref 5–17)
AST SERPL-CCNC: 51 U/L — HIGH (ref 10–40)
BASOPHILS # BLD AUTO: 0 K/UL — SIGNIFICANT CHANGE UP (ref 0–0.2)
BASOPHILS NFR BLD AUTO: 0 % — SIGNIFICANT CHANGE UP (ref 0–2)
BILIRUB SERPL-MCNC: 1.1 MG/DL — SIGNIFICANT CHANGE UP (ref 0.2–1.2)
BUN SERPL-MCNC: 20 MG/DL — SIGNIFICANT CHANGE UP (ref 7–23)
CALCIUM SERPL-MCNC: 9 MG/DL — SIGNIFICANT CHANGE UP (ref 8.4–10.5)
CEA SERPL-MCNC: 119 NG/ML — HIGH (ref 0–3.8)
CHLORIDE SERPL-SCNC: 104 MMOL/L — SIGNIFICANT CHANGE UP (ref 96–108)
CO2 SERPL-SCNC: 24 MMOL/L — SIGNIFICANT CHANGE UP (ref 22–31)
CREAT ?TM UR-MCNC: 177 MG/DL — SIGNIFICANT CHANGE UP
CREAT SERPL-MCNC: 0.74 MG/DL — SIGNIFICANT CHANGE UP (ref 0.5–1.3)
EGFR: 105 ML/MIN/1.73M2 — SIGNIFICANT CHANGE UP
EGFR: 105 ML/MIN/1.73M2 — SIGNIFICANT CHANGE UP
EOSINOPHIL # BLD AUTO: 0.03 K/UL — SIGNIFICANT CHANGE UP (ref 0–0.5)
EOSINOPHIL NFR BLD AUTO: 1.5 % — SIGNIFICANT CHANGE UP (ref 0–6)
GLUCOSE SERPL-MCNC: 146 MG/DL — HIGH (ref 70–99)
HCT VFR BLD CALC: 36.3 % — LOW (ref 39–50)
HGB BLD-MCNC: 12.2 G/DL — LOW (ref 13–17)
IMM GRANULOCYTES NFR BLD AUTO: 0 % — SIGNIFICANT CHANGE UP (ref 0–0.9)
LDH SERPL L TO P-CCNC: 252 U/L — HIGH (ref 50–242)
LYMPHOCYTES # BLD AUTO: 0.49 K/UL — LOW (ref 1–3.3)
LYMPHOCYTES # BLD AUTO: 24 % — SIGNIFICANT CHANGE UP (ref 13–44)
MAGNESIUM SERPL-MCNC: 2 MG/DL — SIGNIFICANT CHANGE UP (ref 1.6–2.6)
MCHC RBC-ENTMCNC: 33.4 PG — SIGNIFICANT CHANGE UP (ref 27–34)
MCHC RBC-ENTMCNC: 33.6 G/DL — SIGNIFICANT CHANGE UP (ref 32–36)
MCV RBC AUTO: 99.5 FL — SIGNIFICANT CHANGE UP (ref 80–100)
MONOCYTES # BLD AUTO: 0.43 K/UL — SIGNIFICANT CHANGE UP (ref 0–0.9)
MONOCYTES NFR BLD AUTO: 21.1 % — HIGH (ref 2–14)
NEUTROPHILS # BLD AUTO: 1.09 K/UL — LOW (ref 1.8–7.4)
NEUTROPHILS NFR BLD AUTO: 53.4 % — SIGNIFICANT CHANGE UP (ref 43–77)
NRBC BLD AUTO-RTO: 0 /100 WBCS — SIGNIFICANT CHANGE UP (ref 0–0)
PLATELET # BLD AUTO: 80 K/UL — LOW (ref 150–400)
POTASSIUM SERPL-MCNC: 4.3 MMOL/L — SIGNIFICANT CHANGE UP (ref 3.5–5.3)
POTASSIUM SERPL-SCNC: 4.3 MMOL/L — SIGNIFICANT CHANGE UP (ref 3.5–5.3)
PROT ?TM UR-MCNC: 20 MG/DL — HIGH (ref 0–12)
PROT SERPL-MCNC: 5.9 G/DL — LOW (ref 6–8.3)
PROT/CREAT UR-RTO: 0.1 RATIO — SIGNIFICANT CHANGE UP (ref 0–0.2)
RBC # BLD: 3.65 M/UL — LOW (ref 4.2–5.8)
RBC # FLD: 15.8 % — HIGH (ref 10.3–14.5)
SODIUM SERPL-SCNC: 139 MMOL/L — SIGNIFICANT CHANGE UP (ref 135–145)
WBC # BLD: 2.04 K/UL — LOW (ref 3.8–10.5)
WBC # FLD AUTO: 2.04 K/UL — LOW (ref 3.8–10.5)

## 2025-08-12 PROCEDURE — G2211 COMPLEX E/M VISIT ADD ON: CPT | Mod: NC

## 2025-08-12 PROCEDURE — 99215 OFFICE O/P EST HI 40 MIN: CPT

## 2025-08-18 ENCOUNTER — LABORATORY RESULT (OUTPATIENT)
Age: 60
End: 2025-08-18

## 2025-08-18 LAB
BASOPHILS # BLD AUTO: 0.01 K/UL
BASOPHILS NFR BLD AUTO: 0.3 %
EOSINOPHIL # BLD AUTO: 0.01 K/UL
EOSINOPHIL NFR BLD AUTO: 0.3 %
HCT VFR BLD CALC: 39.4 %
HGB BLD-MCNC: 13.6 G/DL
IMM GRANULOCYTES NFR BLD AUTO: 0.3 %
LYMPHOCYTES # BLD AUTO: 0.59 K/UL
LYMPHOCYTES NFR BLD AUTO: 17.5 %
MAN DIFF?: NORMAL
MCHC RBC-ENTMCNC: 34.4 PG
MCHC RBC-ENTMCNC: 34.5 G/DL
MCV RBC AUTO: 99.7 FL
MONOCYTES # BLD AUTO: 0.4 K/UL
MONOCYTES NFR BLD AUTO: 11.9 %
NEUTROPHILS # BLD AUTO: 2.35 K/UL
NEUTROPHILS NFR BLD AUTO: 69.7 %
PLATELET # BLD AUTO: 75 K/UL
RBC # BLD: 3.95 M/UL
RBC # FLD: 14.9 %
WBC # FLD AUTO: 3.37 K/UL

## 2025-08-26 ENCOUNTER — RESULT REVIEW (OUTPATIENT)
Age: 60
End: 2025-08-26

## 2025-08-26 ENCOUNTER — APPOINTMENT (OUTPATIENT)
Dept: RADIATION ONCOLOGY | Facility: CLINIC | Age: 60
End: 2025-08-26

## 2025-08-26 ENCOUNTER — APPOINTMENT (OUTPATIENT)
Dept: HEMATOLOGY ONCOLOGY | Facility: CLINIC | Age: 60
End: 2025-08-26
Payer: COMMERCIAL

## 2025-08-26 ENCOUNTER — APPOINTMENT (OUTPATIENT)
Dept: INFUSION THERAPY | Facility: HOSPITAL | Age: 60
End: 2025-08-26

## 2025-08-26 DIAGNOSIS — C18.9 MALIGNANT NEOPLASM OF COLON, UNSPECIFIED: ICD-10-CM

## 2025-08-26 DIAGNOSIS — C78.7 MALIGNANT NEOPLASM OF COLON, UNSPECIFIED: ICD-10-CM

## 2025-08-26 LAB
ALBUMIN SERPL ELPH-MCNC: 3.8 G/DL — SIGNIFICANT CHANGE UP (ref 3.3–5)
ALP SERPL-CCNC: 114 U/L — SIGNIFICANT CHANGE UP (ref 40–120)
ALT FLD-CCNC: 102 U/L — HIGH (ref 10–45)
ANION GAP SERPL CALC-SCNC: 12 MMOL/L — SIGNIFICANT CHANGE UP (ref 5–17)
AST SERPL-CCNC: 59 U/L — HIGH (ref 10–40)
BASOPHILS # BLD AUTO: 0.03 K/UL — SIGNIFICANT CHANGE UP (ref 0–0.2)
BASOPHILS NFR BLD AUTO: 0.7 % — SIGNIFICANT CHANGE UP (ref 0–2)
BILIRUB SERPL-MCNC: 1 MG/DL — SIGNIFICANT CHANGE UP (ref 0.2–1.2)
BUN SERPL-MCNC: 13 MG/DL — SIGNIFICANT CHANGE UP (ref 7–23)
CALCIUM SERPL-MCNC: 9 MG/DL — SIGNIFICANT CHANGE UP (ref 8.4–10.5)
CEA SERPL-MCNC: 134 NG/ML — HIGH (ref 0–3.8)
CHLORIDE SERPL-SCNC: 102 MMOL/L — SIGNIFICANT CHANGE UP (ref 96–108)
CO2 SERPL-SCNC: 23 MMOL/L — SIGNIFICANT CHANGE UP (ref 22–31)
CREAT SERPL-MCNC: 0.62 MG/DL — SIGNIFICANT CHANGE UP (ref 0.5–1.3)
EGFR: 111 ML/MIN/1.73M2 — SIGNIFICANT CHANGE UP
EGFR: 111 ML/MIN/1.73M2 — SIGNIFICANT CHANGE UP
EOSINOPHIL # BLD AUTO: 0.03 K/UL — SIGNIFICANT CHANGE UP (ref 0–0.5)
EOSINOPHIL NFR BLD AUTO: 0.7 % — SIGNIFICANT CHANGE UP (ref 0–6)
GLUCOSE SERPL-MCNC: 159 MG/DL — HIGH (ref 70–99)
HCT VFR BLD CALC: 35.6 % — LOW (ref 39–50)
HGB BLD-MCNC: 12.7 G/DL — LOW (ref 13–17)
IMM GRANULOCYTES NFR BLD AUTO: 0.2 % — SIGNIFICANT CHANGE UP (ref 0–0.9)
LDH SERPL L TO P-CCNC: 253 U/L — HIGH (ref 50–242)
LYMPHOCYTES # BLD AUTO: 0.55 K/UL — LOW (ref 1–3.3)
LYMPHOCYTES # BLD AUTO: 12.5 % — LOW (ref 13–44)
MAGNESIUM SERPL-MCNC: 1.9 MG/DL — SIGNIFICANT CHANGE UP (ref 1.6–2.6)
MCHC RBC-ENTMCNC: 34.4 PG — HIGH (ref 27–34)
MCHC RBC-ENTMCNC: 35.7 G/DL — SIGNIFICANT CHANGE UP (ref 32–36)
MCV RBC AUTO: 96.5 FL — SIGNIFICANT CHANGE UP (ref 80–100)
MONOCYTES # BLD AUTO: 0.45 K/UL — SIGNIFICANT CHANGE UP (ref 0–0.9)
MONOCYTES NFR BLD AUTO: 10.2 % — SIGNIFICANT CHANGE UP (ref 2–14)
NEUTROPHILS # BLD AUTO: 3.33 K/UL — SIGNIFICANT CHANGE UP (ref 1.8–7.4)
NEUTROPHILS NFR BLD AUTO: 75.7 % — SIGNIFICANT CHANGE UP (ref 43–77)
NRBC BLD AUTO-RTO: 0 /100 WBCS — SIGNIFICANT CHANGE UP (ref 0–0)
PLATELET # BLD AUTO: 80 K/UL — LOW (ref 150–400)
POTASSIUM SERPL-MCNC: 4 MMOL/L — SIGNIFICANT CHANGE UP (ref 3.5–5.3)
POTASSIUM SERPL-SCNC: 4 MMOL/L — SIGNIFICANT CHANGE UP (ref 3.5–5.3)
PROT SERPL-MCNC: 5.9 G/DL — LOW (ref 6–8.3)
RBC # BLD: 3.69 M/UL — LOW (ref 4.2–5.8)
RBC # FLD: 14.8 % — HIGH (ref 10.3–14.5)
SODIUM SERPL-SCNC: 137 MMOL/L — SIGNIFICANT CHANGE UP (ref 135–145)
WBC # BLD: 4.4 K/UL — SIGNIFICANT CHANGE UP (ref 3.8–10.5)
WBC # FLD AUTO: 4.4 K/UL — SIGNIFICANT CHANGE UP (ref 3.8–10.5)

## 2025-08-26 PROCEDURE — 99024 POSTOP FOLLOW-UP VISIT: CPT

## 2025-08-26 PROCEDURE — G2211 COMPLEX E/M VISIT ADD ON: CPT | Mod: NC

## 2025-08-26 PROCEDURE — 99214 OFFICE O/P EST MOD 30 MIN: CPT

## 2025-09-08 ENCOUNTER — APPOINTMENT (OUTPATIENT)
Dept: GERIATRICS | Facility: CLINIC | Age: 60
End: 2025-09-08

## 2025-09-08 VITALS
WEIGHT: 167.55 LBS | OXYGEN SATURATION: 96 % | HEIGHT: 68 IN | BODY MASS INDEX: 25.39 KG/M2 | RESPIRATION RATE: 16 BRPM | SYSTOLIC BLOOD PRESSURE: 96 MMHG | HEART RATE: 98 BPM | DIASTOLIC BLOOD PRESSURE: 61 MMHG | TEMPERATURE: 97.3 F

## 2025-09-08 PROCEDURE — 99497 ADVNCD CARE PLAN 30 MIN: CPT

## 2025-09-08 PROCEDURE — 99205 OFFICE O/P NEW HI 60 MIN: CPT

## 2025-09-09 ENCOUNTER — RESULT REVIEW (OUTPATIENT)
Age: 60
End: 2025-09-09

## 2025-09-09 ENCOUNTER — APPOINTMENT (OUTPATIENT)
Dept: HEMATOLOGY ONCOLOGY | Facility: CLINIC | Age: 60
End: 2025-09-09
Payer: COMMERCIAL

## 2025-09-09 ENCOUNTER — NON-APPOINTMENT (OUTPATIENT)
Age: 60
End: 2025-09-09

## 2025-09-09 ENCOUNTER — APPOINTMENT (OUTPATIENT)
Dept: INFUSION THERAPY | Facility: HOSPITAL | Age: 60
End: 2025-09-09

## 2025-09-09 PROCEDURE — 99214 OFFICE O/P EST MOD 30 MIN: CPT

## 2025-09-09 PROCEDURE — G2211 COMPLEX E/M VISIT ADD ON: CPT | Mod: NC

## 2025-09-15 ENCOUNTER — APPOINTMENT (OUTPATIENT)
Dept: CT IMAGING | Facility: CLINIC | Age: 60
End: 2025-09-15
Payer: COMMERCIAL

## 2025-09-15 PROCEDURE — 71260 CT THORAX DX C+: CPT | Mod: 26

## 2025-09-15 PROCEDURE — 74177 CT ABD & PELVIS W/CONTRAST: CPT | Mod: 26

## 2025-09-22 PROBLEM — R19.7 DIARRHEA: Status: ACTIVE | Noted: 2025-09-22

## 2025-09-22 PROBLEM — R63.0 LOSS OF APPETITE: Status: ACTIVE | Noted: 2025-09-22

## 2025-09-22 PROBLEM — G47.9 TROUBLE IN SLEEPING: Status: ACTIVE | Noted: 2025-09-22

## 2025-09-22 PROBLEM — Z51.5 ENCOUNTER FOR PALLIATIVE CARE: Status: ACTIVE | Noted: 2025-09-22
